# Patient Record
Sex: MALE | Race: WHITE | Employment: OTHER | URBAN - NONMETROPOLITAN AREA
[De-identification: names, ages, dates, MRNs, and addresses within clinical notes are randomized per-mention and may not be internally consistent; named-entity substitution may affect disease eponyms.]

---

## 2017-01-04 ENCOUNTER — HOSPITAL ENCOUNTER (OUTPATIENT)
Dept: CARDIAC REHAB | Age: 67
Discharge: HOME | End: 2017-01-04

## 2017-01-04 PROCEDURE — 9430000000 HC XCARDIAC REHAB PHASE 3X

## 2017-01-12 ENCOUNTER — TELEPHONE (OUTPATIENT)
Dept: CARDIOLOGY | Age: 67
End: 2017-01-12

## 2017-01-12 DIAGNOSIS — I48.0 PAROXYSMAL ATRIAL FIBRILLATION (HCC): Primary | ICD-10-CM

## 2017-01-12 DIAGNOSIS — E78.5 HYPERLIPIDEMIA, UNSPECIFIED HYPERLIPIDEMIA TYPE: ICD-10-CM

## 2017-01-12 RX ORDER — POTASSIUM CHLORIDE 750 MG/1
20 CAPSULE, EXTENDED RELEASE ORAL EVERY EVENING
Qty: 180 CAPSULE | Refills: 3 | Status: SHIPPED | OUTPATIENT
Start: 2017-01-12 | End: 2017-12-20 | Stop reason: SDUPTHER

## 2017-01-25 ENCOUNTER — ANTI-COAG VISIT (OUTPATIENT)
Dept: CARDIOLOGY | Age: 67
End: 2017-01-25
Payer: MEDICARE

## 2017-01-25 DIAGNOSIS — I48.0 PAROXYSMAL ATRIAL FIBRILLATION (HCC): Primary | ICD-10-CM

## 2017-01-25 LAB
INTERNATIONAL NORMALIZATION RATIO, POC: 2
PROTHROMBIN TIME, POC: NORMAL

## 2017-01-25 PROCEDURE — 85610 PROTHROMBIN TIME: CPT | Performed by: NURSE PRACTITIONER

## 2017-02-28 ENCOUNTER — ANTI-COAG VISIT (OUTPATIENT)
Dept: CARDIOLOGY | Age: 67
End: 2017-02-28
Payer: MEDICARE

## 2017-02-28 DIAGNOSIS — I48.0 PAROXYSMAL ATRIAL FIBRILLATION (HCC): Primary | ICD-10-CM

## 2017-02-28 LAB
INTERNATIONAL NORMALIZATION RATIO, POC: 2.9
PROTHROMBIN TIME, POC: NORMAL

## 2017-02-28 PROCEDURE — 85610 PROTHROMBIN TIME: CPT | Performed by: CLINICAL NURSE SPECIALIST

## 2017-03-18 ENCOUNTER — OFFICE VISIT (OUTPATIENT)
Dept: URGENT CARE | Age: 67
End: 2017-03-18
Payer: MEDICARE

## 2017-03-18 VITALS
SYSTOLIC BLOOD PRESSURE: 125 MMHG | BODY MASS INDEX: 31.97 KG/M2 | HEART RATE: 80 BPM | WEIGHT: 236 LBS | HEIGHT: 72 IN | OXYGEN SATURATION: 98 % | DIASTOLIC BLOOD PRESSURE: 66 MMHG | TEMPERATURE: 99.3 F

## 2017-03-18 DIAGNOSIS — J20.9 ACUTE BRONCHITIS, UNSPECIFIED ORGANISM: Primary | ICD-10-CM

## 2017-03-18 DIAGNOSIS — R52 BODY ACHES: ICD-10-CM

## 2017-03-18 LAB
INFLUENZA A ANTIBODY: NEGATIVE
INFLUENZA B ANTIBODY: NEGATIVE

## 2017-03-18 PROCEDURE — 87804 INFLUENZA ASSAY W/OPTIC: CPT | Performed by: FAMILY MEDICINE

## 2017-03-18 PROCEDURE — 99213 OFFICE O/P EST LOW 20 MIN: CPT | Performed by: FAMILY MEDICINE

## 2017-03-18 RX ORDER — DOXYCYCLINE HYCLATE 100 MG/1
100 CAPSULE ORAL 2 TIMES DAILY
Qty: 20 CAPSULE | Refills: 0 | Status: SHIPPED | OUTPATIENT
Start: 2017-03-18 | End: 2017-03-28

## 2017-03-18 ASSESSMENT — ENCOUNTER SYMPTOMS
COUGH: 1
HEMOPTYSIS: 0
WHEEZING: 0
SHORTNESS OF BREATH: 0
SINUS PRESSURE: 0
RHINORRHEA: 1
SORE THROAT: 1

## 2017-03-20 ENCOUNTER — OFFICE VISIT (OUTPATIENT)
Dept: CARDIOLOGY | Age: 67
End: 2017-03-20
Payer: MEDICARE

## 2017-03-20 VITALS
BODY MASS INDEX: 31.02 KG/M2 | HEART RATE: 77 BPM | HEIGHT: 72 IN | SYSTOLIC BLOOD PRESSURE: 102 MMHG | DIASTOLIC BLOOD PRESSURE: 66 MMHG | WEIGHT: 229 LBS

## 2017-03-20 DIAGNOSIS — I48.0 PAROXYSMAL ATRIAL FIBRILLATION (HCC): Primary | ICD-10-CM

## 2017-03-20 LAB
INTERNATIONAL NORMALIZATION RATIO, POC: 1.7
PROTHROMBIN TIME, POC: NORMAL

## 2017-03-20 PROCEDURE — 99214 OFFICE O/P EST MOD 30 MIN: CPT | Performed by: INTERNAL MEDICINE

## 2017-03-20 PROCEDURE — 85610 PROTHROMBIN TIME: CPT | Performed by: INTERNAL MEDICINE

## 2017-03-20 PROCEDURE — 93000 ELECTROCARDIOGRAM COMPLETE: CPT | Performed by: INTERNAL MEDICINE

## 2017-03-30 ENCOUNTER — ANTI-COAG VISIT (OUTPATIENT)
Dept: CARDIOLOGY | Age: 67
End: 2017-03-30
Payer: MEDICARE

## 2017-03-30 DIAGNOSIS — I48.0 PAROXYSMAL ATRIAL FIBRILLATION (HCC): Primary | ICD-10-CM

## 2017-03-30 LAB
INTERNATIONAL NORMALIZATION RATIO, POC: 3
PROTHROMBIN TIME, POC: NORMAL

## 2017-03-30 PROCEDURE — 85610 PROTHROMBIN TIME: CPT | Performed by: CLINICAL NURSE SPECIALIST

## 2017-04-13 ENCOUNTER — ANTI-COAG VISIT (OUTPATIENT)
Dept: CARDIOLOGY | Age: 67
End: 2017-04-13
Payer: MEDICARE

## 2017-04-13 DIAGNOSIS — I48.0 PAROXYSMAL ATRIAL FIBRILLATION (HCC): Primary | ICD-10-CM

## 2017-04-13 LAB
INTERNATIONAL NORMALIZATION RATIO, POC: 2.8
PROTHROMBIN TIME, POC: NORMAL

## 2017-04-13 PROCEDURE — 85610 PROTHROMBIN TIME: CPT | Performed by: CLINICAL NURSE SPECIALIST

## 2017-04-19 ENCOUNTER — OFFICE VISIT (OUTPATIENT)
Dept: CARDIOLOGY | Age: 67
End: 2017-04-19
Payer: MEDICARE

## 2017-04-19 ENCOUNTER — TELEPHONE (OUTPATIENT)
Dept: CARDIOLOGY | Age: 67
End: 2017-04-19

## 2017-04-19 DIAGNOSIS — I48.91 ATRIAL FIBRILLATION, UNSPECIFIED TYPE (HCC): Primary | ICD-10-CM

## 2017-04-19 PROCEDURE — 93000 ELECTROCARDIOGRAM COMPLETE: CPT | Performed by: CLINICAL NURSE SPECIALIST

## 2017-05-18 ENCOUNTER — ANTI-COAG VISIT (OUTPATIENT)
Dept: CARDIOLOGY | Age: 67
End: 2017-05-18
Payer: MEDICARE

## 2017-05-18 DIAGNOSIS — I48.0 PAROXYSMAL ATRIAL FIBRILLATION (HCC): Primary | ICD-10-CM

## 2017-05-18 LAB
INTERNATIONAL NORMALIZATION RATIO, POC: 2.6
PROTHROMBIN TIME, POC: NORMAL

## 2017-05-18 PROCEDURE — 85610 PROTHROMBIN TIME: CPT | Performed by: NURSE PRACTITIONER

## 2017-06-15 ENCOUNTER — ANTI-COAG VISIT (OUTPATIENT)
Dept: CARDIOLOGY | Age: 67
End: 2017-06-15
Payer: MEDICARE

## 2017-06-15 DIAGNOSIS — I48.0 PAROXYSMAL ATRIAL FIBRILLATION (HCC): Primary | ICD-10-CM

## 2017-06-15 LAB
INTERNATIONAL NORMALIZATION RATIO, POC: 2.9
PROTHROMBIN TIME, POC: NORMAL

## 2017-06-15 PROCEDURE — 85610 PROTHROMBIN TIME: CPT | Performed by: CLINICAL NURSE SPECIALIST

## 2017-06-15 RX ORDER — FUROSEMIDE 80 MG
80 TABLET ORAL DAILY
Qty: 90 TABLET | Refills: 3 | Status: SHIPPED | OUTPATIENT
Start: 2017-06-15

## 2017-07-06 ENCOUNTER — OFFICE VISIT (OUTPATIENT)
Dept: CARDIOLOGY | Age: 67
End: 2017-07-06
Payer: MEDICARE

## 2017-07-06 VITALS
BODY MASS INDEX: 31.69 KG/M2 | HEART RATE: 96 BPM | HEIGHT: 72 IN | SYSTOLIC BLOOD PRESSURE: 118 MMHG | WEIGHT: 234 LBS | DIASTOLIC BLOOD PRESSURE: 78 MMHG

## 2017-07-06 DIAGNOSIS — I48.0 PAROXYSMAL ATRIAL FIBRILLATION (HCC): Primary | ICD-10-CM

## 2017-07-06 LAB
INTERNATIONAL NORMALIZATION RATIO, POC: 2.7
PROTHROMBIN TIME, POC: NORMAL

## 2017-07-06 PROCEDURE — 99214 OFFICE O/P EST MOD 30 MIN: CPT | Performed by: INTERNAL MEDICINE

## 2017-07-06 PROCEDURE — 93000 ELECTROCARDIOGRAM COMPLETE: CPT | Performed by: INTERNAL MEDICINE

## 2017-07-06 PROCEDURE — 85610 PROTHROMBIN TIME: CPT | Performed by: INTERNAL MEDICINE

## 2017-07-06 RX ORDER — METOPROLOL SUCCINATE 25 MG/1
25 TABLET, EXTENDED RELEASE ORAL 2 TIMES DAILY
Qty: 180 TABLET | Refills: 3 | Status: SHIPPED | OUTPATIENT
Start: 2017-07-06 | End: 2018-04-26 | Stop reason: DRUGHIGH

## 2017-07-07 ENCOUNTER — TELEPHONE (OUTPATIENT)
Dept: CARDIOLOGY | Age: 67
End: 2017-07-07

## 2017-07-12 ENCOUNTER — TELEPHONE (OUTPATIENT)
Dept: CARDIOLOGY | Age: 67
End: 2017-07-12

## 2017-07-12 ENCOUNTER — HOSPITAL ENCOUNTER (OUTPATIENT)
Dept: CARDIAC CATH/INVASIVE PROCEDURES | Age: 67
Discharge: HOME | End: 2017-07-12
Payer: MEDICARE

## 2017-07-12 VITALS
DIASTOLIC BLOOD PRESSURE: 61 MMHG | TEMPERATURE: 98.8 F | HEART RATE: 73 BPM | SYSTOLIC BLOOD PRESSURE: 110 MMHG | RESPIRATION RATE: 13 BRPM | OXYGEN SATURATION: 98 % | BODY MASS INDEX: 31.56 KG/M2 | HEIGHT: 72 IN | WEIGHT: 233 LBS

## 2017-07-12 DIAGNOSIS — I48.0 PAROXYSMAL ATRIAL FIBRILLATION (HCC): ICD-10-CM

## 2017-07-12 LAB
ANION GAP SERPL CALCULATED.3IONS-SCNC: 12 MMOL/L (ref 7–19)
BUN BLDV-MCNC: 20 MG/DL (ref 8–23)
CALCIUM SERPL-MCNC: 9.2 MG/DL (ref 8.8–10.2)
CHLORIDE BLD-SCNC: 102 MMOL/L (ref 98–111)
CO2: 26 MMOL/L (ref 22–29)
CREAT SERPL-MCNC: 1.2 MG/DL (ref 0.5–1.2)
GFR NON-AFRICAN AMERICAN: >60
GLUCOSE BLD-MCNC: 136 MG/DL (ref 74–109)
INR BLD: 1.59 (ref 0.88–1.18)
INR BLD: 1.99 (ref 0.88–1.18)
POTASSIUM SERPL-SCNC: 4.2 MMOL/L (ref 3.5–5)
PROTHROMBIN TIME: 19 SEC (ref 12–14.6)
PROTHROMBIN TIME: 22.7 SEC (ref 12–14.6)
SODIUM BLD-SCNC: 140 MMOL/L (ref 136–145)

## 2017-07-12 PROCEDURE — 92960 CARDIOVERSION ELECTRIC EXT: CPT | Performed by: INTERNAL MEDICINE

## 2017-07-12 PROCEDURE — 2580000003 HC RX 258: Performed by: INTERNAL MEDICINE

## 2017-07-12 PROCEDURE — 36415 COLL VENOUS BLD VENIPUNCTURE: CPT

## 2017-07-12 PROCEDURE — 2500000003 HC RX 250 WO HCPCS

## 2017-07-12 PROCEDURE — 6370000000 HC RX 637 (ALT 250 FOR IP): Performed by: INTERNAL MEDICINE

## 2017-07-12 PROCEDURE — 85610 PROTHROMBIN TIME: CPT

## 2017-07-12 PROCEDURE — 92960 CARDIOVERSION ELECTRIC EXT: CPT

## 2017-07-12 PROCEDURE — 80048 BASIC METABOLIC PNL TOTAL CA: CPT

## 2017-07-12 RX ORDER — SODIUM CHLORIDE 9 MG/ML
INJECTION, SOLUTION INTRAVENOUS CONTINUOUS
Status: DISCONTINUED | OUTPATIENT
Start: 2017-07-12 | End: 2017-07-14 | Stop reason: HOSPADM

## 2017-07-12 RX ADMIN — APIXABAN 5 MG: 2.5 TABLET, FILM COATED ORAL at 11:07

## 2017-07-12 RX ADMIN — SODIUM CHLORIDE: 9 INJECTION, SOLUTION INTRAVENOUS at 07:40

## 2017-07-15 ENCOUNTER — OFFICE VISIT (OUTPATIENT)
Dept: URGENT CARE | Age: 67
End: 2017-07-15
Payer: MEDICARE

## 2017-07-15 VITALS
OXYGEN SATURATION: 98 % | BODY MASS INDEX: 31.29 KG/M2 | HEART RATE: 72 BPM | TEMPERATURE: 98.3 F | SYSTOLIC BLOOD PRESSURE: 106 MMHG | WEIGHT: 231 LBS | HEIGHT: 72 IN | DIASTOLIC BLOOD PRESSURE: 71 MMHG | RESPIRATION RATE: 20 BRPM

## 2017-07-15 DIAGNOSIS — J01.90 ACUTE SINUSITIS, UNSPECIFIED: Primary | ICD-10-CM

## 2017-07-15 PROCEDURE — 99213 OFFICE O/P EST LOW 20 MIN: CPT | Performed by: PHYSICIAN ASSISTANT

## 2017-07-15 RX ORDER — SULFAMETHOXAZOLE AND TRIMETHOPRIM 800; 160 MG/1; MG/1
1 TABLET ORAL 2 TIMES DAILY
Qty: 20 TABLET | Refills: 0 | Status: SHIPPED | OUTPATIENT
Start: 2017-07-15 | End: 2017-07-25

## 2017-07-15 ASSESSMENT — ENCOUNTER SYMPTOMS
SHORTNESS OF BREATH: 0
COUGH: 1
SORE THROAT: 0
HOARSE VOICE: 0
GASTROINTESTINAL NEGATIVE: 1
SINUS PRESSURE: 1

## 2017-07-19 ENCOUNTER — OFFICE VISIT (OUTPATIENT)
Dept: CARDIOLOGY | Age: 67
End: 2017-07-19
Payer: MEDICARE

## 2017-07-19 VITALS
SYSTOLIC BLOOD PRESSURE: 124 MMHG | HEIGHT: 72 IN | DIASTOLIC BLOOD PRESSURE: 70 MMHG | HEART RATE: 68 BPM | BODY MASS INDEX: 31.42 KG/M2 | WEIGHT: 232 LBS

## 2017-07-19 DIAGNOSIS — R55 SYNCOPE AND COLLAPSE: Primary | ICD-10-CM

## 2017-07-19 PROCEDURE — 93000 ELECTROCARDIOGRAM COMPLETE: CPT | Performed by: CLINICAL NURSE SPECIALIST

## 2017-08-15 ENCOUNTER — TELEPHONE (OUTPATIENT)
Dept: CARDIOLOGY | Age: 67
End: 2017-08-15

## 2017-08-16 ENCOUNTER — TELEPHONE (OUTPATIENT)
Dept: CARDIOLOGY | Age: 67
End: 2017-08-16

## 2017-08-22 ENCOUNTER — HOSPITAL ENCOUNTER (EMERGENCY)
Age: 67
Discharge: HOME | End: 2017-08-23
Attending: EMERGENCY MEDICINE
Payer: MEDICARE

## 2017-08-22 ENCOUNTER — APPOINTMENT (OUTPATIENT)
Dept: GENERAL RADIOLOGY | Age: 67
End: 2017-08-22
Payer: MEDICARE

## 2017-08-22 DIAGNOSIS — G45.8 OTHER SPECIFIED TRANSIENT CEREBRAL ISCHEMIAS: Primary | ICD-10-CM

## 2017-08-22 DIAGNOSIS — R77.8 ELEVATED TROPONIN: ICD-10-CM

## 2017-08-22 DIAGNOSIS — Z98.890 HISTORY OF CARDIAC RADIOFREQUENCY ABLATION: ICD-10-CM

## 2017-08-22 LAB
ALBUMIN SERPL-MCNC: 4.1 G/DL (ref 3.5–5.2)
ALP BLD-CCNC: 64 U/L (ref 40–130)
ALT SERPL-CCNC: 17 U/L (ref 5–41)
ANION GAP SERPL CALCULATED.3IONS-SCNC: 13 MMOL/L (ref 7–19)
AST SERPL-CCNC: 20 U/L (ref 5–40)
BASOPHILS ABSOLUTE: 0.1 K/UL (ref 0–0.2)
BASOPHILS RELATIVE PERCENT: 0.7 % (ref 0–1)
BILIRUB SERPL-MCNC: 0.6 MG/DL (ref 0.2–1.2)
BUN BLDV-MCNC: 19 MG/DL (ref 8–23)
CALCIUM SERPL-MCNC: 10 MG/DL (ref 8.8–10.2)
CHLORIDE BLD-SCNC: 96 MMOL/L (ref 98–111)
CO2: 28 MMOL/L (ref 22–29)
CREAT SERPL-MCNC: 0.9 MG/DL (ref 0.5–1.2)
EOSINOPHILS ABSOLUTE: 0.5 K/UL (ref 0–0.6)
EOSINOPHILS RELATIVE PERCENT: 5.3 % (ref 0–5)
GFR NON-AFRICAN AMERICAN: >60
GLUCOSE BLD-MCNC: 113 MG/DL (ref 74–109)
HCT VFR BLD CALC: 45.3 % (ref 42–52)
HEMOGLOBIN: 15.5 G/DL (ref 14–18)
INR BLD: 1.09 (ref 0.88–1.18)
LYMPHOCYTES ABSOLUTE: 2.7 K/UL (ref 1.1–4.5)
LYMPHOCYTES RELATIVE PERCENT: 31.9 % (ref 20–40)
MCH RBC QN AUTO: 32 PG (ref 27–31)
MCHC RBC AUTO-ENTMCNC: 34.2 G/DL (ref 33–37)
MCV RBC AUTO: 93.4 FL (ref 80–94)
MONOCYTES ABSOLUTE: 1.1 K/UL (ref 0–0.9)
MONOCYTES RELATIVE PERCENT: 13.1 % (ref 0–10)
NEUTROPHILS ABSOLUTE: 4.1 K/UL (ref 1.5–7.5)
NEUTROPHILS RELATIVE PERCENT: 48.3 % (ref 50–65)
PDW BLD-RTO: 12.1 % (ref 11.5–14.5)
PERFORMED ON: ABNORMAL
PLATELET # BLD: 242 K/UL (ref 130–400)
PMV BLD AUTO: 10 FL (ref 9.4–12.4)
POC TROPONIN I: 0.15 NG/ML (ref 0–0.08)
POTASSIUM SERPL-SCNC: 4.2 MMOL/L (ref 3.5–5)
PROTHROMBIN TIME: 14 SEC (ref 12–14.6)
RBC # BLD: 4.85 M/UL (ref 4.7–6.1)
SODIUM BLD-SCNC: 137 MMOL/L (ref 136–145)
TOTAL PROTEIN: 8.4 G/DL (ref 6.6–8.7)
TROPONIN: 0.12 NG/ML (ref 0–0.03)
WBC # BLD: 8.5 K/UL (ref 4.8–10.8)

## 2017-08-22 PROCEDURE — 85025 COMPLETE CBC W/AUTO DIFF WBC: CPT

## 2017-08-22 PROCEDURE — 85610 PROTHROMBIN TIME: CPT

## 2017-08-22 PROCEDURE — 71010 XR CHEST PORTABLE: CPT

## 2017-08-22 PROCEDURE — 80053 COMPREHEN METABOLIC PANEL: CPT

## 2017-08-22 PROCEDURE — 93005 ELECTROCARDIOGRAM TRACING: CPT

## 2017-08-22 PROCEDURE — 84484 ASSAY OF TROPONIN QUANT: CPT

## 2017-08-22 PROCEDURE — 99285 EMERGENCY DEPT VISIT HI MDM: CPT

## 2017-08-22 PROCEDURE — 6370000000 HC RX 637 (ALT 250 FOR IP): Performed by: EMERGENCY MEDICINE

## 2017-08-22 PROCEDURE — 36415 COLL VENOUS BLD VENIPUNCTURE: CPT

## 2017-08-22 RX ORDER — ASPIRIN 325 MG
325 TABLET ORAL ONCE
Status: COMPLETED | OUTPATIENT
Start: 2017-08-22 | End: 2017-08-22

## 2017-08-22 RX ORDER — MONTELUKAST SODIUM 10 MG/1
10 TABLET ORAL NIGHTLY
COMMUNITY
End: 2017-10-23 | Stop reason: ALTCHOICE

## 2017-08-22 RX ADMIN — ASPIRIN 325 MG ORAL TABLET 325 MG: 325 PILL ORAL at 22:13

## 2017-08-23 ENCOUNTER — TELEPHONE (OUTPATIENT)
Dept: CARDIOLOGY | Age: 67
End: 2017-08-23

## 2017-08-23 ENCOUNTER — HOSPITAL ENCOUNTER (OUTPATIENT)
Dept: NON INVASIVE DIAGNOSTICS | Age: 67
Discharge: HOME | End: 2017-08-23
Payer: MEDICARE

## 2017-08-23 VITALS
DIASTOLIC BLOOD PRESSURE: 68 MMHG | OXYGEN SATURATION: 94 % | HEIGHT: 72 IN | WEIGHT: 227 LBS | HEART RATE: 70 BPM | RESPIRATION RATE: 18 BRPM | BODY MASS INDEX: 30.75 KG/M2 | SYSTOLIC BLOOD PRESSURE: 118 MMHG

## 2017-08-23 LAB
PERFORMED ON: ABNORMAL
POC TROPONIN I: 0.13 NG/ML (ref 0–0.08)
TROPONIN: 0.1 NG/ML (ref 0–0.03)

## 2017-08-23 PROCEDURE — 99284 EMERGENCY DEPT VISIT MOD MDM: CPT | Performed by: EMERGENCY MEDICINE

## 2017-08-23 PROCEDURE — 84484 ASSAY OF TROPONIN QUANT: CPT

## 2017-08-23 PROCEDURE — 36415 COLL VENOUS BLD VENIPUNCTURE: CPT

## 2017-08-23 PROCEDURE — 93880 EXTRACRANIAL BILAT STUDY: CPT

## 2017-08-23 ASSESSMENT — ENCOUNTER SYMPTOMS
SHORTNESS OF BREATH: 0
VOMITING: 0
BACK PAIN: 0
EYE PAIN: 0
ABDOMINAL PAIN: 0
COUGH: 0

## 2017-08-24 ENCOUNTER — OFFICE VISIT (OUTPATIENT)
Dept: CARDIOLOGY | Age: 67
End: 2017-08-24
Payer: MEDICARE

## 2017-08-24 VITALS
HEIGHT: 72 IN | SYSTOLIC BLOOD PRESSURE: 124 MMHG | HEART RATE: 81 BPM | DIASTOLIC BLOOD PRESSURE: 62 MMHG | BODY MASS INDEX: 31.02 KG/M2 | WEIGHT: 229 LBS

## 2017-08-24 DIAGNOSIS — I48.0 PAROXYSMAL ATRIAL FIBRILLATION (HCC): Primary | ICD-10-CM

## 2017-08-24 PROCEDURE — 99213 OFFICE O/P EST LOW 20 MIN: CPT | Performed by: INTERNAL MEDICINE

## 2017-08-24 PROCEDURE — 93000 ELECTROCARDIOGRAM COMPLETE: CPT | Performed by: INTERNAL MEDICINE

## 2017-08-30 LAB
EKG P AXIS: 152 DEGREES
EKG P AXIS: 40 DEGREES
EKG P-R INTERVAL: 226 MS
EKG P-R INTERVAL: 229 MS
EKG Q-T INTERVAL: 376 MS
EKG Q-T INTERVAL: 393 MS
EKG QRS DURATION: 101 MS
EKG QRS DURATION: 101 MS
EKG QTC CALCULATION (BAZETT): 429 MS
EKG QTC CALCULATION (BAZETT): 431 MS
EKG T AXIS: 103 DEGREES
EKG T AXIS: 38 DEGREES

## 2017-09-27 DIAGNOSIS — I48.19 PERSISTENT ATRIAL FIBRILLATION (HCC): ICD-10-CM

## 2017-09-27 RX ORDER — LISINOPRIL 5 MG/1
TABLET ORAL
Qty: 90 TABLET | Refills: 2 | Status: ON HOLD | OUTPATIENT
Start: 2017-09-27 | End: 2018-07-11 | Stop reason: HOSPADM

## 2017-10-02 ENCOUNTER — TELEPHONE (OUTPATIENT)
Dept: CARDIOLOGY | Age: 67
End: 2017-10-02

## 2017-10-18 ENCOUNTER — OFFICE VISIT (OUTPATIENT)
Dept: NEUROLOGY | Age: 67
End: 2017-10-18
Payer: MEDICARE

## 2017-10-18 VITALS
HEART RATE: 83 BPM | BODY MASS INDEX: 31.42 KG/M2 | WEIGHT: 232 LBS | DIASTOLIC BLOOD PRESSURE: 67 MMHG | HEIGHT: 72 IN | SYSTOLIC BLOOD PRESSURE: 104 MMHG

## 2017-10-18 DIAGNOSIS — Z95.1 S/P CABG X 4: ICD-10-CM

## 2017-10-18 DIAGNOSIS — G45.3 AMAUROSIS FUGAX: Primary | ICD-10-CM

## 2017-10-18 DIAGNOSIS — I48.0 PAROXYSMAL ATRIAL FIBRILLATION (HCC): ICD-10-CM

## 2017-10-18 DIAGNOSIS — I25.110 CORONARY ARTERY DISEASE INVOLVING NATIVE CORONARY ARTERY OF NATIVE HEART WITH UNSTABLE ANGINA PECTORIS (HCC): ICD-10-CM

## 2017-10-18 PROCEDURE — 99204 OFFICE O/P NEW MOD 45 MIN: CPT | Performed by: PSYCHIATRY & NEUROLOGY

## 2017-10-18 RX ORDER — APIXABAN 5 MG/1
TABLET, FILM COATED ORAL
Refills: 3 | COMMUNITY
Start: 2017-10-08

## 2017-10-18 NOTE — PROGRESS NOTES
Review of Systems    Constitutional  No fever or chills. yes diaphoresis or significant fatigue. HENT   yes tinnitus or significant hearing loss. Eyes  no sudden vision change or eye pain  Respiratory  yes significant shortness of breath or cough  Cardiovascular  yes chest pain No palpitations or significant leg swelling  Gastrointestinal  no abdominal swelling or pain. Genitourinary  No difficulty urinating, dysuria  Musculoskeletal  no back pain or myalgia. Skin  no color change or rash  Neurologic  No seizures. No lateralizing weakness. Hematologic  yes easy bruising or excessive bleeding. Psychiatric  no severe anxiety or nervousness. All other review of systems are negative.

## 2017-10-18 NOTE — PROGRESS NOTES
Chief Complaint   Patient presents with    New Patient     Referred by Dr. Bola Brown for recent TIA        Lorraine Dai is a 79y.o. year old male who is seen for evaluation of Visual impairment in the left eye. The patient indicates approximately 1 month ago he was taking a shower he noticed a checkered appearance in his left eye. He covered and uncovered the right eye and noticed it was only in the left. He denied diplopia, dysarthria, dysphagia, weakness, numbness, or incoordination. He denied any neurological symptoms. He denies any previous similar episodes. There is no headache. He denies any history of migraine. The episode lasted about 30 minutes and resolved. He has had no subsequent events. He does a history of cataract surgery. He is on aspirin and Eliquis and does not miss any doses. His carotid ultrasound was relatively unremarkable.     Active Ambulatory Problems     Diagnosis Date Noted    Atrial fibrillation ([facility] 07/08/2013    CAD (coronary artery disease)     Hyperlipidemia     Sloughing of skin 08/20/2013    S/P CABG x 4 11/11/2013    History of amiodarone therapy     Shortness of breath 07/01/2014    Ex-smoker     CAD (coronary atherosclerotic disease)     Persistent atrial fibrillation (HCC)     Persistent atrial fibrillation (HCC)     Chest pain 06/22/2016    Coronary artery disease involving native coronary artery of native heart with unstable angina pectoris ([facility] 06/22/2016    Syncope and collapse     Typical atrial flutter (HCC)     Paroxysmal atrial fibrillation (HCC)     Chronic combined systolic and diastolic heart failure (HCC)     Sick sinus syndrome (HCC)     Amaurosis fugax 10/18/2017     Resolved Ambulatory Problems     Diagnosis Date Noted    No Resolved Ambulatory Problems     Past Medical History:   Diagnosis Date    Atrial fibrillation (HCC)     Atrial flutter (HCC)     CAD (coronary artery disease)     CAD (coronary atherosclerotic disease)     [facility] disease) [facility]     Diabetes mellitus ([facility]     Ex-smoker     History of amiodarone therapy     Hyperlipidemia     Hypertension     Hypokalemia     Hypotension     MI (myocardial infarction)     S/P CABG x 4 11/11/2013    Stroke ([facility] 08/22/2017       Past Surgical History:   Procedure Laterality Date    ABLATION OF DYSRHYTHMIC FOCUS      CARDIAC CATHETERIZATION  6/27/13  MDL    EF 45%    CARDIAC SURGERY      CABG x 4    CATARACT REMOVAL WITH IMPLANT Bilateral     CORONARY ARTERY BYPASS GRAFT  6/27/2013     Emergency CABG x 4, LIMA-DIAG, SVG-LAD, SVG-OM, SVG-PDA, RT EVH, DR DOLAN    CYST INCISION AND DRAINAGE N/A     RECTAL    EYE SURGERY      EYE SURGERY      cataract sx and implants (both eyes)    OTHER SURGICAL HISTORY      heart ablation    RETROPHYARYNGEAL ABCESS INCISION/DRAIN      Abcess near rectum       Family History   Problem Relation Age of Onset    Stroke Father     Heart Disease Father     High Blood Pressure Father     High Cholesterol Father     Other Father     Heart Disease Other        Allergies   Allergen Reactions    Amiodarone Rash    Pcn [Penicillins]     Penicillins Other (See Comments)     Don't know reaction, was a child. But grandparents(who raised him) said to not take    Amiodarone Rash       Social History     Social History    Marital status:      Spouse name: N/A    Number of children: N/A    Years of education: N/A     Occupational History    Not on file. Social History Main Topics    Smoking status: Former Smoker     Packs/day: 1.50     Years: 40.00     Types: Cigarettes    Smokeless tobacco: Never Used      Comment: quit in 2013    Alcohol use No    Drug use: No    Sexual activity: Yes     Partners: Female     Other Topics Concern    Not on file     Social History Narrative    ** Merged History Encounter **            Review of Systems     Constitutional  No fever or chills.   yes diaphoresis or significant fatigue. HENT   yes tinnitus or significant hearing loss. Eyes  no sudden vision change or eye pain  Respiratory  yes significant shortness of breath or cough  Cardiovascular  yes chest pain No palpitations or significant leg swelling  Gastrointestinal  no abdominal swelling or pain. Genitourinary  No difficulty urinating, dysuria  Musculoskeletal  no back pain or myalgia. Skin  no color change or rash  Neurologic  No seizures. No lateralizing weakness. Hematologic  yes easy bruising or excessive bleeding. Psychiatric  no severe anxiety or nervousness. All other review of systems are negative. Current Outpatient Prescriptions   Medication Sig Dispense Refill    ELIQUIS 5 MG TABS tablet TAKE 1 TABLET BY MOUTH 2 TIMES DAILY  3    lisinopril (PRINIVIL;ZESTRIL) 5 MG tablet TAKE 1/2 TABLET BY MOUTH 2 TIMES DAILY 90 tablet 2    montelukast (SINGULAIR) 10 MG tablet Take 10 mg by mouth nightly      metoprolol succinate (TOPROL XL) 25 MG extended release tablet Take 1 tablet by mouth 2 times daily 180 tablet 3    furosemide (LASIX) 80 MG tablet Take 1 tablet by mouth daily 90 tablet 3    potassium chloride (MICRO-K) 10 MEQ extended release capsule Take 2 capsules by mouth every evening 180 capsule 3    atorvastatin (LIPITOR) 20 MG tablet Take 1 tablet by mouth daily 90 tablet 3    aspirin 81 MG tablet Take 81 mg by mouth daily      Cholecalciferol (VITAMIN D) 2000 UNITS CAPS capsule Take 1,000 Units by mouth nightly        No current facility-administered medications for this visit. /67   Pulse 83   Ht 6' (1.829 m)   Wt 232 lb (105.2 kg)   BMI 31.46 kg/m²     Constitutional  well developed, well nourished.     Eyes  conjunctiva normal.  Pupils react to light  Ear, nose, throat -hearing intact to finger rub No scars, masses, or lesions over external nose or ears, no atrophy of tongue  Neck-symmetric, no masses noted, no jugular vein distension  Respiration- coronary artery of native heart with unstable angina pectoris ([facility]) I25.110 414.01      411.1        His neurological examination today was unremarkable. Based upon his history and examination, symptoms are certainly consistent with a TIA. He is optimized on his medications. I see no further medication management warranted. His carotid ultrasound was essentially unremarkable except for some heterogeneous plaque, which is nonspecific. I recommended that he get a complete eye exam to ensure he does not have an ocular cause for his symptoms. No further recommendations were provided. He is to follow-up with me on a when necessary basis and call with any further problems. Plan    No orders of the defined types were placed in this encounter. No orders of the defined types were placed in this encounter. Return if symptoms worsen or fail to improve.

## 2017-10-23 ENCOUNTER — OFFICE VISIT (OUTPATIENT)
Dept: CARDIOLOGY | Age: 67
End: 2017-10-23
Payer: MEDICARE

## 2017-10-23 VITALS
HEART RATE: 90 BPM | HEIGHT: 72 IN | WEIGHT: 232 LBS | DIASTOLIC BLOOD PRESSURE: 62 MMHG | BODY MASS INDEX: 31.42 KG/M2 | SYSTOLIC BLOOD PRESSURE: 118 MMHG

## 2017-10-23 DIAGNOSIS — I48.0 PAROXYSMAL ATRIAL FIBRILLATION (HCC): Primary | ICD-10-CM

## 2017-10-23 PROCEDURE — 93000 ELECTROCARDIOGRAM COMPLETE: CPT | Performed by: INTERNAL MEDICINE

## 2017-10-23 PROCEDURE — 99213 OFFICE O/P EST LOW 20 MIN: CPT | Performed by: INTERNAL MEDICINE

## 2017-10-24 NOTE — PROGRESS NOTES
[facility], [place]. [address], [place], [address] [address]  Phone: [phone]  Fax: [phone]  Office Visit:  10/23/2017    Renetta Lopez : 1950, Male, 79 y.o. Chief Complaint   Patient presents with    3 Month Follow-Up       HISTORY OF PRESENT ILLNESS:    He presents today for follow-up of his paroxysmal atrial fibrillation. He is status post an ablation procedure down in [place]. I was performed a couple of months ago. I subsequently saw him because of change in his vision that was abrupt and thought possibly to be a retinal TIA. He actually was seen in the emergency room for this. I referred him to neurology for their evaluation which was performed in October. They reviewed his evaluation and suggested that he see an  Ophthalmologist.    He says that he has had no recurrence of visual disturbance and no other neurologic symptoms. He does have occasional episode of chest pain that occurs at random. He has not been having palpitations. He has chronic dyspnea on exertion which has not changed. He feels better since his ablation procedure. He says that he has some chronic phlegm in his throat since the procedure. He said that he was able to wheelbarrow 16 tons of gravel by himself over the course of several days without difficulty.     Patient Active Problem List   Diagnosis Code    Atrial fibrillation (HCC) I48.91    CAD (coronary artery disease) I25.10    Hyperlipidemia E78.5    Sloughing of skin L08.9    S/P CABG x 4 Z95.1    History of amiodarone therapy Z92.29    Shortness of breath R06.02    Ex-smoker Z87.891    CAD (coronary atherosclerotic disease) I25.10    Persistent atrial fibrillation (HCC) I48.1    Persistent atrial fibrillation (HCC) I48.1    Chest pain R07.9    Coronary artery disease involving native coronary artery of native heart with unstable angina pectoris ([facility] I25.110   

## 2017-10-25 ENCOUNTER — TELEPHONE (OUTPATIENT)
Dept: CARDIOLOGY | Age: 67
End: 2017-10-25

## 2017-10-25 NOTE — TELEPHONE ENCOUNTER
Tried to call patient, no answer, no v/m set up to leave message. Bruna Blase do you know anything about the change in the medication from warfarin to eliquis. Patient is requesting letter for [place].

## 2017-10-25 NOTE — TELEPHONE ENCOUNTER
Pt is in the process of getting his meds from [place], except hi Eliquis, which they need a letter from Dr Pito Osei stating that he is on Eliquis because Warfarin wouldn't stabilize his BP. Please call pt @ [phone] when letter is ready.

## 2017-10-26 ENCOUNTER — TELEPHONE (OUTPATIENT)
Dept: CARDIOLOGY | Age: 67
End: 2017-10-26

## 2017-10-26 NOTE — TELEPHONE ENCOUNTER
Would you like me to type up a letter with this information on it? [facility].

## 2017-10-26 NOTE — TELEPHONE ENCOUNTER
From the medication records it appears patient was placed on eliquis on 7/12/17. Patient had labile INR results and needed better control due to multiple episodes of being out of rhythm.     Waylon Gilmore

## 2017-12-20 DIAGNOSIS — I48.0 PAROXYSMAL ATRIAL FIBRILLATION (HCC): ICD-10-CM

## 2017-12-20 RX ORDER — POTASSIUM CHLORIDE 750 MG/1
20 CAPSULE, EXTENDED RELEASE ORAL EVERY EVENING
Qty: 180 CAPSULE | Refills: 3 | Status: SHIPPED | OUTPATIENT
Start: 2017-12-20 | End: 2018-04-26 | Stop reason: DRUGHIGH

## 2018-04-16 ENCOUNTER — HOSPITAL ENCOUNTER (OUTPATIENT)
Dept: PHYSICAL THERAPY | Age: 68
Setting detail: THERAPIES SERIES
Discharge: HOME | End: 2018-04-16
Payer: MEDICARE

## 2018-04-16 PROCEDURE — G8984 CARRY CURRENT STATUS: HCPCS

## 2018-04-16 PROCEDURE — 97162 PT EVAL MOD COMPLEX 30 MIN: CPT

## 2018-04-16 PROCEDURE — G8985 CARRY GOAL STATUS: HCPCS

## 2018-04-16 ASSESSMENT — PAIN DESCRIPTION - LOCATION: LOCATION: SHOULDER

## 2018-04-16 ASSESSMENT — PAIN DESCRIPTION - ORIENTATION: ORIENTATION: LEFT;RIGHT

## 2018-04-16 ASSESSMENT — PAIN DESCRIPTION - PAIN TYPE: TYPE: ACUTE PAIN

## 2018-04-16 ASSESSMENT — PAIN DESCRIPTION - DESCRIPTORS: DESCRIPTORS: SHARP

## 2018-04-16 ASSESSMENT — PAIN DESCRIPTION - FREQUENCY: FREQUENCY: INTERMITTENT

## 2018-04-20 ENCOUNTER — HOSPITAL ENCOUNTER (OUTPATIENT)
Dept: CT IMAGING | Age: 68
Discharge: HOME | End: 2018-04-20
Payer: MEDICARE

## 2018-04-20 DIAGNOSIS — R04.2 HEMOPTYSIS: ICD-10-CM

## 2018-04-20 PROCEDURE — 71260 CT THORAX DX C+: CPT

## 2018-04-20 PROCEDURE — 6360000004 HC RX CONTRAST MEDICATION: Performed by: INTERNAL MEDICINE

## 2018-04-20 RX ADMIN — IOPAMIDOL 50 ML: 755 INJECTION, SOLUTION INTRAVENOUS at 10:28

## 2018-04-23 ENCOUNTER — HOSPITAL ENCOUNTER (OUTPATIENT)
Dept: PHYSICAL THERAPY | Age: 68
Setting detail: THERAPIES SERIES
Discharge: HOME | End: 2018-04-23
Payer: MEDICARE

## 2018-04-23 PROCEDURE — 97035 APP MDLTY 1+ULTRASOUND EA 15: CPT

## 2018-04-23 PROCEDURE — 97110 THERAPEUTIC EXERCISES: CPT

## 2018-04-23 ASSESSMENT — PAIN DESCRIPTION - DESCRIPTORS: DESCRIPTORS: SHARP

## 2018-04-23 ASSESSMENT — PAIN DESCRIPTION - ORIENTATION: ORIENTATION: RIGHT;LEFT

## 2018-04-23 ASSESSMENT — PAIN DESCRIPTION - PAIN TYPE: TYPE: ACUTE PAIN

## 2018-04-23 ASSESSMENT — PAIN SCALES - GENERAL: PAINLEVEL_OUTOF10: 4

## 2018-04-23 ASSESSMENT — PAIN DESCRIPTION - LOCATION: LOCATION: SHOULDER

## 2018-04-25 ENCOUNTER — HOSPITAL ENCOUNTER (OUTPATIENT)
Dept: PHYSICAL THERAPY | Age: 68
Setting detail: THERAPIES SERIES
Discharge: HOME | End: 2018-04-25
Payer: MEDICARE

## 2018-04-25 PROCEDURE — 97035 APP MDLTY 1+ULTRASOUND EA 15: CPT

## 2018-04-25 PROCEDURE — 97110 THERAPEUTIC EXERCISES: CPT

## 2018-04-25 ASSESSMENT — PAIN SCALES - GENERAL: PAINLEVEL_OUTOF10: 3

## 2018-04-25 ASSESSMENT — PAIN DESCRIPTION - PAIN TYPE: TYPE: CHRONIC PAIN

## 2018-04-25 ASSESSMENT — PAIN DESCRIPTION - LOCATION: LOCATION: SHOULDER

## 2018-04-25 ASSESSMENT — PAIN DESCRIPTION - ORIENTATION: ORIENTATION: RIGHT;LEFT

## 2018-04-26 ENCOUNTER — OFFICE VISIT (OUTPATIENT)
Dept: CARDIOLOGY | Age: 68
End: 2018-04-26
Payer: MEDICARE

## 2018-04-26 VITALS
WEIGHT: 231 LBS | HEART RATE: 76 BPM | BODY MASS INDEX: 31.29 KG/M2 | SYSTOLIC BLOOD PRESSURE: 126 MMHG | HEIGHT: 72 IN | DIASTOLIC BLOOD PRESSURE: 72 MMHG

## 2018-04-26 DIAGNOSIS — Z98.890 S/P ABLATION OF ATRIAL FIBRILLATION: ICD-10-CM

## 2018-04-26 DIAGNOSIS — I48.0 PAROXYSMAL ATRIAL FIBRILLATION (HCC): ICD-10-CM

## 2018-04-26 DIAGNOSIS — I25.10 CORONARY ARTERY DISEASE INVOLVING NATIVE CORONARY ARTERY OF NATIVE HEART WITHOUT ANGINA PECTORIS: Primary | ICD-10-CM

## 2018-04-26 DIAGNOSIS — Z95.1 S/P CABG X 4: ICD-10-CM

## 2018-04-26 DIAGNOSIS — I10 ESSENTIAL HYPERTENSION: ICD-10-CM

## 2018-04-26 DIAGNOSIS — Z79.01 CHRONIC ANTICOAGULATION: ICD-10-CM

## 2018-04-26 DIAGNOSIS — Z86.79 S/P ABLATION OF ATRIAL FIBRILLATION: ICD-10-CM

## 2018-04-26 DIAGNOSIS — E78.2 MIXED HYPERLIPIDEMIA: ICD-10-CM

## 2018-04-26 PROCEDURE — 93000 ELECTROCARDIOGRAM COMPLETE: CPT | Performed by: NURSE PRACTITIONER

## 2018-04-26 PROCEDURE — 99213 OFFICE O/P EST LOW 20 MIN: CPT | Performed by: NURSE PRACTITIONER

## 2018-04-26 RX ORDER — METOPROLOL SUCCINATE 25 MG/1
TABLET, EXTENDED RELEASE ORAL
Status: ON HOLD | COMMUNITY
End: 2018-07-11 | Stop reason: HOSPADM

## 2018-04-26 RX ORDER — POTASSIUM CHLORIDE 750 MG/1
20 CAPSULE, EXTENDED RELEASE ORAL NIGHTLY
COMMUNITY
End: 2018-08-14 | Stop reason: SDUPTHER

## 2018-04-26 RX ORDER — ATORVASTATIN CALCIUM 20 MG/1
TABLET, FILM COATED ORAL
Status: ON HOLD | COMMUNITY
End: 2018-07-11 | Stop reason: HOSPADM

## 2018-04-30 ENCOUNTER — HOSPITAL ENCOUNTER (OUTPATIENT)
Dept: PHYSICAL THERAPY | Age: 68
Setting detail: THERAPIES SERIES
Discharge: HOME | End: 2018-04-30
Payer: MEDICARE

## 2018-04-30 PROCEDURE — 97110 THERAPEUTIC EXERCISES: CPT

## 2018-05-02 ENCOUNTER — HOSPITAL ENCOUNTER (OUTPATIENT)
Dept: PHYSICAL THERAPY | Age: 68
Setting detail: THERAPIES SERIES
Discharge: HOME | End: 2018-05-02
Payer: MEDICARE

## 2018-05-02 PROCEDURE — 97035 APP MDLTY 1+ULTRASOUND EA 15: CPT

## 2018-05-02 PROCEDURE — 97110 THERAPEUTIC EXERCISES: CPT

## 2018-05-02 ASSESSMENT — PAIN DESCRIPTION - PAIN TYPE: TYPE: CHRONIC PAIN

## 2018-05-02 ASSESSMENT — PAIN SCALES - GENERAL: PAINLEVEL_OUTOF10: 1

## 2018-05-02 ASSESSMENT — PAIN DESCRIPTION - DESCRIPTORS: DESCRIPTORS: SHARP

## 2018-05-02 ASSESSMENT — PAIN DESCRIPTION - LOCATION: LOCATION: SHOULDER

## 2018-05-02 ASSESSMENT — PAIN DESCRIPTION - ORIENTATION: ORIENTATION: RIGHT;LEFT

## 2018-05-07 ENCOUNTER — HOSPITAL ENCOUNTER (OUTPATIENT)
Dept: PHYSICAL THERAPY | Age: 68
Setting detail: THERAPIES SERIES
Discharge: HOME | End: 2018-05-07
Payer: MEDICARE

## 2018-05-07 PROCEDURE — 97110 THERAPEUTIC EXERCISES: CPT

## 2018-05-09 ENCOUNTER — HOSPITAL ENCOUNTER (OUTPATIENT)
Dept: PHYSICAL THERAPY | Age: 68
Setting detail: THERAPIES SERIES
Discharge: HOME | End: 2018-05-09
Payer: MEDICARE

## 2018-05-09 PROCEDURE — 97110 THERAPEUTIC EXERCISES: CPT

## 2018-05-09 ASSESSMENT — PAIN SCALES - GENERAL: PAINLEVEL_OUTOF10: 2

## 2018-05-09 ASSESSMENT — PAIN DESCRIPTION - LOCATION: LOCATION: SHOULDER

## 2018-05-09 ASSESSMENT — PAIN DESCRIPTION - PAIN TYPE: TYPE: CHRONIC PAIN

## 2018-05-09 ASSESSMENT — PAIN DESCRIPTION - ORIENTATION: ORIENTATION: RIGHT;LEFT

## 2018-05-14 ENCOUNTER — HOSPITAL ENCOUNTER (OUTPATIENT)
Dept: PHYSICAL THERAPY | Age: 68
Setting detail: THERAPIES SERIES
Discharge: HOME | End: 2018-05-14
Payer: MEDICARE

## 2018-05-14 PROCEDURE — 97110 THERAPEUTIC EXERCISES: CPT

## 2018-05-14 PROCEDURE — G8985 CARRY GOAL STATUS: HCPCS

## 2018-05-14 PROCEDURE — G8984 CARRY CURRENT STATUS: HCPCS

## 2018-05-16 ENCOUNTER — APPOINTMENT (OUTPATIENT)
Dept: PHYSICAL THERAPY | Age: 68
End: 2018-05-16
Payer: MEDICARE

## 2018-05-21 PROCEDURE — G8984 CARRY CURRENT STATUS: HCPCS

## 2018-05-21 PROCEDURE — G8986 CARRY D/C STATUS: HCPCS

## 2018-05-21 PROCEDURE — G8985 CARRY GOAL STATUS: HCPCS

## 2018-05-28 ENCOUNTER — APPOINTMENT (OUTPATIENT)
Dept: GENERAL RADIOLOGY | Age: 68
End: 2018-05-28
Payer: MEDICARE

## 2018-05-28 ENCOUNTER — HOSPITAL ENCOUNTER (EMERGENCY)
Age: 68
Discharge: HOME | End: 2018-05-28
Attending: EMERGENCY MEDICINE
Payer: MEDICARE

## 2018-05-28 VITALS
SYSTOLIC BLOOD PRESSURE: 111 MMHG | OXYGEN SATURATION: 94 % | TEMPERATURE: 98.2 F | RESPIRATION RATE: 20 BRPM | WEIGHT: 225 LBS | BODY MASS INDEX: 30.52 KG/M2 | DIASTOLIC BLOOD PRESSURE: 67 MMHG | HEART RATE: 82 BPM

## 2018-05-28 DIAGNOSIS — R07.89 ATYPICAL CHEST PAIN: Primary | ICD-10-CM

## 2018-05-28 DIAGNOSIS — M62.838 SPASM OF MUSCLE: ICD-10-CM

## 2018-05-28 LAB
ALBUMIN SERPL-MCNC: 4.3 G/DL (ref 3.5–5.2)
ALP BLD-CCNC: 62 U/L (ref 40–130)
ALT SERPL-CCNC: 9 U/L (ref 5–41)
ANION GAP SERPL CALCULATED.3IONS-SCNC: 13 MMOL/L (ref 7–19)
APTT: 28.1 SEC (ref 26–36.2)
AST SERPL-CCNC: 15 U/L (ref 5–40)
BASOPHILS ABSOLUTE: 0 K/UL (ref 0–0.2)
BASOPHILS RELATIVE PERCENT: 0.4 % (ref 0–1)
BILIRUB SERPL-MCNC: 0.9 MG/DL (ref 0.2–1.2)
BUN BLDV-MCNC: 14 MG/DL (ref 8–23)
CALCIUM SERPL-MCNC: 9.1 MG/DL (ref 8.8–10.2)
CHLORIDE BLD-SCNC: 98 MMOL/L (ref 98–111)
CO2: 27 MMOL/L (ref 22–29)
CREAT SERPL-MCNC: 0.8 MG/DL (ref 0.5–1.2)
EOSINOPHILS ABSOLUTE: 0.4 K/UL (ref 0–0.6)
EOSINOPHILS RELATIVE PERCENT: 4.3 % (ref 0–5)
GFR NON-AFRICAN AMERICAN: >60
GLUCOSE BLD-MCNC: 151 MG/DL (ref 74–109)
HCT VFR BLD CALC: 42.9 % (ref 42–52)
HEMOGLOBIN: 14.3 G/DL (ref 14–18)
INR BLD: 1.15 (ref 0.88–1.18)
LYMPHOCYTES ABSOLUTE: 1.8 K/UL (ref 1.1–4.5)
LYMPHOCYTES RELATIVE PERCENT: 19.9 % (ref 20–40)
MAGNESIUM: 2 MG/DL (ref 1.6–2.4)
MCH RBC QN AUTO: 31.5 PG (ref 27–31)
MCHC RBC AUTO-ENTMCNC: 33.3 G/DL (ref 33–37)
MCV RBC AUTO: 94.5 FL (ref 80–94)
MONOCYTES ABSOLUTE: 0.7 K/UL (ref 0–0.9)
MONOCYTES RELATIVE PERCENT: 7.8 % (ref 0–10)
NEUTROPHILS ABSOLUTE: 6.1 K/UL (ref 1.5–7.5)
NEUTROPHILS RELATIVE PERCENT: 67.1 % (ref 50–65)
PDW BLD-RTO: 12.4 % (ref 11.5–14.5)
PERFORMED ON: NORMAL
PERFORMED ON: NORMAL
PLATELET # BLD: 249 K/UL (ref 130–400)
PMV BLD AUTO: 9.9 FL (ref 9.4–12.4)
POC TROPONIN I: 0 NG/ML (ref 0–0.08)
POC TROPONIN I: 0.01 NG/ML (ref 0–0.08)
POTASSIUM SERPL-SCNC: 3.6 MMOL/L (ref 3.5–5)
PRO-BNP: 297 PG/ML (ref 0–900)
PROTHROMBIN TIME: 14.6 SEC (ref 12–14.6)
RBC # BLD: 4.54 M/UL (ref 4.7–6.1)
SODIUM BLD-SCNC: 138 MMOL/L (ref 136–145)
TOTAL PROTEIN: 7.4 G/DL (ref 6.6–8.7)
WBC # BLD: 9.1 K/UL (ref 4.8–10.8)

## 2018-05-28 PROCEDURE — 85730 THROMBOPLASTIN TIME PARTIAL: CPT

## 2018-05-28 PROCEDURE — 83735 ASSAY OF MAGNESIUM: CPT

## 2018-05-28 PROCEDURE — 99285 EMERGENCY DEPT VISIT HI MDM: CPT

## 2018-05-28 PROCEDURE — 80053 COMPREHEN METABOLIC PANEL: CPT

## 2018-05-28 PROCEDURE — 84484 ASSAY OF TROPONIN QUANT: CPT

## 2018-05-28 PROCEDURE — 85610 PROTHROMBIN TIME: CPT

## 2018-05-28 PROCEDURE — 93005 ELECTROCARDIOGRAM TRACING: CPT

## 2018-05-28 PROCEDURE — 99284 EMERGENCY DEPT VISIT MOD MDM: CPT | Performed by: EMERGENCY MEDICINE

## 2018-05-28 PROCEDURE — 83880 ASSAY OF NATRIURETIC PEPTIDE: CPT

## 2018-05-28 PROCEDURE — 71045 X-RAY EXAM CHEST 1 VIEW: CPT

## 2018-05-28 PROCEDURE — 36415 COLL VENOUS BLD VENIPUNCTURE: CPT

## 2018-05-28 PROCEDURE — 85025 COMPLETE CBC W/AUTO DIFF WBC: CPT

## 2018-05-28 ASSESSMENT — ENCOUNTER SYMPTOMS
SHORTNESS OF BREATH: 0
NAUSEA: 0
COUGH: 0
SORE THROAT: 0
VOMITING: 0
RHINORRHEA: 0
ABDOMINAL PAIN: 0
BACK PAIN: 0
DIARRHEA: 0

## 2018-05-30 LAB
EKG P AXIS: 34 DEGREES
EKG P-R INTERVAL: 208 MS
EKG Q-T INTERVAL: 378 MS
EKG QRS DURATION: 98 MS
EKG QTC CALCULATION (BAZETT): 414 MS
EKG T AXIS: 68 DEGREES

## 2018-05-31 ENCOUNTER — OFFICE VISIT (OUTPATIENT)
Dept: CARDIOLOGY | Age: 68
End: 2018-05-31
Payer: MEDICARE

## 2018-05-31 VITALS
WEIGHT: 224 LBS | DIASTOLIC BLOOD PRESSURE: 86 MMHG | HEIGHT: 72 IN | BODY MASS INDEX: 30.34 KG/M2 | HEART RATE: 82 BPM | SYSTOLIC BLOOD PRESSURE: 126 MMHG

## 2018-05-31 DIAGNOSIS — I25.10 CORONARY ARTERY DISEASE INVOLVING NATIVE CORONARY ARTERY OF NATIVE HEART WITHOUT ANGINA PECTORIS: Primary | ICD-10-CM

## 2018-05-31 PROCEDURE — 99213 OFFICE O/P EST LOW 20 MIN: CPT | Performed by: NURSE PRACTITIONER

## 2018-05-31 PROCEDURE — 93000 ELECTROCARDIOGRAM COMPLETE: CPT | Performed by: NURSE PRACTITIONER

## 2018-06-20 ENCOUNTER — OFFICE VISIT (OUTPATIENT)
Dept: CARDIOLOGY | Age: 68
End: 2018-06-20
Payer: MEDICARE

## 2018-06-20 ENCOUNTER — HOSPITAL ENCOUNTER (OUTPATIENT)
Dept: NON INVASIVE DIAGNOSTICS | Age: 68
Discharge: HOME | End: 2018-06-20
Payer: MEDICARE

## 2018-06-20 VITALS
SYSTOLIC BLOOD PRESSURE: 128 MMHG | DIASTOLIC BLOOD PRESSURE: 78 MMHG | HEIGHT: 72 IN | BODY MASS INDEX: 30.34 KG/M2 | WEIGHT: 224 LBS | HEART RATE: 72 BPM

## 2018-06-20 DIAGNOSIS — I49.9 IRREGULAR HEART RHYTHM: ICD-10-CM

## 2018-06-20 DIAGNOSIS — I48.0 PAF (PAROXYSMAL ATRIAL FIBRILLATION) (HCC): ICD-10-CM

## 2018-06-20 DIAGNOSIS — I25.10 CORONARY ARTERY DISEASE INVOLVING NATIVE CORONARY ARTERY OF NATIVE HEART WITHOUT ANGINA PECTORIS: ICD-10-CM

## 2018-06-20 DIAGNOSIS — R07.9 CHEST PAIN, UNSPECIFIED TYPE: Primary | ICD-10-CM

## 2018-06-20 PROCEDURE — 99214 OFFICE O/P EST MOD 30 MIN: CPT | Performed by: NURSE PRACTITIONER

## 2018-06-20 PROCEDURE — 93229 REMOTE 30 DAY ECG TECH SUPP: CPT

## 2018-06-20 PROCEDURE — 99999 PR EXT ECG > 48HR TO 21 DAY RCRD W/CONECT INTL RCRD: CPT | Performed by: NURSE PRACTITIONER

## 2018-06-20 PROCEDURE — 93000 ELECTROCARDIOGRAM COMPLETE: CPT | Performed by: NURSE PRACTITIONER

## 2018-07-02 ENCOUNTER — HOSPITAL ENCOUNTER (OUTPATIENT)
Dept: NUCLEAR MEDICINE | Age: 68
Discharge: HOME | End: 2018-07-04
Payer: MEDICARE

## 2018-07-02 ENCOUNTER — HOSPITAL ENCOUNTER (OUTPATIENT)
Dept: NON INVASIVE DIAGNOSTICS | Age: 68
Discharge: HOME | End: 2018-07-02
Payer: MEDICARE

## 2018-07-02 DIAGNOSIS — R07.9 CHEST PAIN, UNSPECIFIED TYPE: ICD-10-CM

## 2018-07-02 DIAGNOSIS — I25.10 CORONARY ARTERY DISEASE INVOLVING NATIVE CORONARY ARTERY OF NATIVE HEART WITHOUT ANGINA PECTORIS: ICD-10-CM

## 2018-07-02 PROCEDURE — 78452 HT MUSCLE IMAGE SPECT MULT: CPT

## 2018-07-02 PROCEDURE — 6360000002 HC RX W HCPCS: Performed by: INTERNAL MEDICINE

## 2018-07-02 PROCEDURE — 93017 CV STRESS TEST TRACING ONLY: CPT

## 2018-07-02 PROCEDURE — 3430000000 HC RX DIAGNOSTIC RADIOPHARMACEUTICAL: Performed by: INTERNAL MEDICINE

## 2018-07-02 PROCEDURE — A9500 TC99M SESTAMIBI: HCPCS | Performed by: INTERNAL MEDICINE

## 2018-07-02 RX ADMIN — TETRAKIS(2-METHOXYISOBUTYLISOCYANIDE)COPPER(I) TETRAFLUOROBORATE 30 MILLICURIE: 1 INJECTION, POWDER, LYOPHILIZED, FOR SOLUTION INTRAVENOUS at 14:41

## 2018-07-02 RX ADMIN — REGADENOSON 0.4 MG: 0.08 INJECTION, SOLUTION INTRAVENOUS at 14:40

## 2018-07-02 RX ADMIN — TETRAKIS(2-METHOXYISOBUTYLISOCYANIDE)COPPER(I) TETRAFLUOROBORATE 10 MILLICURIE: 1 INJECTION, POWDER, LYOPHILIZED, FOR SOLUTION INTRAVENOUS at 14:40

## 2018-07-03 LAB
LV EF: 50 %
LVEF MODALITY: NORMAL

## 2018-07-05 ENCOUNTER — TELEPHONE (OUTPATIENT)
Dept: CARDIOLOGY | Age: 68
End: 2018-07-05

## 2018-07-05 NOTE — TELEPHONE ENCOUNTER
Called and spoke with patient, advised lexiscan results per Dr. Malena Washington. Have cath scheduled for Thursday 7/12/18 @ 130. Gave instructions, verbally understood,.

## 2018-07-10 ENCOUNTER — HOSPITAL ENCOUNTER (OUTPATIENT)
Age: 68
Setting detail: OBSERVATION
Discharge: HOME | End: 2018-07-11
Attending: EMERGENCY MEDICINE | Admitting: INTERNAL MEDICINE
Payer: MEDICARE

## 2018-07-10 ENCOUNTER — APPOINTMENT (OUTPATIENT)
Dept: GENERAL RADIOLOGY | Age: 68
End: 2018-07-10
Payer: MEDICARE

## 2018-07-10 DIAGNOSIS — R94.39 ABNORMAL STRESS TEST: ICD-10-CM

## 2018-07-10 DIAGNOSIS — R07.9 CHEST PAIN, UNSPECIFIED TYPE: Primary | ICD-10-CM

## 2018-07-10 LAB
ALBUMIN SERPL-MCNC: 4.7 G/DL (ref 3.5–5.2)
ALP BLD-CCNC: 68 U/L (ref 40–130)
ALT SERPL-CCNC: 10 U/L (ref 5–41)
ANION GAP SERPL CALCULATED.3IONS-SCNC: 12 MMOL/L (ref 7–19)
AST SERPL-CCNC: 15 U/L (ref 5–40)
BASOPHILS ABSOLUTE: 0.1 K/UL (ref 0–0.2)
BASOPHILS RELATIVE PERCENT: 0.8 % (ref 0–1)
BILIRUB SERPL-MCNC: 0.9 MG/DL (ref 0.2–1.2)
BUN BLDV-MCNC: 10 MG/DL (ref 8–23)
CALCIUM SERPL-MCNC: 10.1 MG/DL (ref 8.8–10.2)
CHLORIDE BLD-SCNC: 98 MMOL/L (ref 98–111)
CO2: 30 MMOL/L (ref 22–29)
CREAT SERPL-MCNC: 0.9 MG/DL (ref 0.5–1.2)
EOSINOPHILS ABSOLUTE: 0.1 K/UL (ref 0–0.6)
EOSINOPHILS RELATIVE PERCENT: 1.9 % (ref 0–5)
GFR NON-AFRICAN AMERICAN: >60
GLUCOSE BLD-MCNC: 110 MG/DL (ref 74–109)
HCT VFR BLD CALC: 44.9 % (ref 42–52)
HEMOGLOBIN: 14.8 G/DL (ref 14–18)
INR BLD: 1.03 (ref 0.88–1.18)
LYMPHOCYTES ABSOLUTE: 2.7 K/UL (ref 1.1–4.5)
LYMPHOCYTES RELATIVE PERCENT: 36.9 % (ref 20–40)
MCH RBC QN AUTO: 31.1 PG (ref 27–31)
MCHC RBC AUTO-ENTMCNC: 33 G/DL (ref 33–37)
MCV RBC AUTO: 94.3 FL (ref 80–94)
MONOCYTES ABSOLUTE: 0.7 K/UL (ref 0–0.9)
MONOCYTES RELATIVE PERCENT: 9.2 % (ref 0–10)
NEUTROPHILS ABSOLUTE: 3.8 K/UL (ref 1.5–7.5)
NEUTROPHILS RELATIVE PERCENT: 50.8 % (ref 50–65)
PDW BLD-RTO: 12.3 % (ref 11.5–14.5)
PLATELET # BLD: 266 K/UL (ref 130–400)
PMV BLD AUTO: 9.9 FL (ref 9.4–12.4)
POTASSIUM SERPL-SCNC: 3.8 MMOL/L (ref 3.5–5)
PRO-BNP: 416 PG/ML (ref 0–900)
PROTHROMBIN TIME: 13.4 SEC (ref 12–14.6)
RBC # BLD: 4.76 M/UL (ref 4.7–6.1)
SODIUM BLD-SCNC: 140 MMOL/L (ref 136–145)
TOTAL PROTEIN: 8 G/DL (ref 6.6–8.7)
TROPONIN: <0.01 NG/ML (ref 0–0.03)
TROPONIN: <0.01 NG/ML (ref 0–0.03)
WBC # BLD: 7.4 K/UL (ref 4.8–10.8)

## 2018-07-10 PROCEDURE — G0378 HOSPITAL OBSERVATION PER HR: HCPCS

## 2018-07-10 PROCEDURE — 83880 ASSAY OF NATRIURETIC PEPTIDE: CPT

## 2018-07-10 PROCEDURE — 84484 ASSAY OF TROPONIN QUANT: CPT

## 2018-07-10 PROCEDURE — 93005 ELECTROCARDIOGRAM TRACING: CPT

## 2018-07-10 PROCEDURE — 71046 X-RAY EXAM CHEST 2 VIEWS: CPT

## 2018-07-10 PROCEDURE — 85610 PROTHROMBIN TIME: CPT

## 2018-07-10 PROCEDURE — 80053 COMPREHEN METABOLIC PANEL: CPT

## 2018-07-10 PROCEDURE — 36415 COLL VENOUS BLD VENIPUNCTURE: CPT

## 2018-07-10 PROCEDURE — 85025 COMPLETE CBC W/AUTO DIFF WBC: CPT

## 2018-07-10 PROCEDURE — 2580000003 HC RX 258: Performed by: INTERNAL MEDICINE

## 2018-07-10 PROCEDURE — 99285 EMERGENCY DEPT VISIT HI MDM: CPT | Performed by: EMERGENCY MEDICINE

## 2018-07-10 PROCEDURE — 99220 PR INITIAL OBSERVATION CARE/DAY 70 MINUTES: CPT | Performed by: INTERNAL MEDICINE

## 2018-07-10 PROCEDURE — 99285 EMERGENCY DEPT VISIT HI MDM: CPT

## 2018-07-10 PROCEDURE — 6370000000 HC RX 637 (ALT 250 FOR IP): Performed by: EMERGENCY MEDICINE

## 2018-07-10 RX ORDER — FUROSEMIDE 80 MG
80 TABLET ORAL DAILY
Status: DISCONTINUED | OUTPATIENT
Start: 2018-07-11 | End: 2018-07-11 | Stop reason: HOSPADM

## 2018-07-10 RX ORDER — POTASSIUM CHLORIDE 750 MG/1
10 TABLET, EXTENDED RELEASE ORAL NIGHTLY
Status: DISCONTINUED | OUTPATIENT
Start: 2018-07-10 | End: 2018-07-11 | Stop reason: HOSPADM

## 2018-07-10 RX ORDER — SODIUM CHLORIDE 0.9 % (FLUSH) 0.9 %
10 SYRINGE (ML) INJECTION PRN
Status: DISCONTINUED | OUTPATIENT
Start: 2018-07-10 | End: 2018-07-11 | Stop reason: HOSPADM

## 2018-07-10 RX ORDER — ASPIRIN 81 MG/1
162 TABLET, CHEWABLE ORAL ONCE
Status: COMPLETED | OUTPATIENT
Start: 2018-07-10 | End: 2018-07-10

## 2018-07-10 RX ORDER — ASPIRIN 81 MG/1
81 TABLET, CHEWABLE ORAL DAILY
Status: DISCONTINUED | OUTPATIENT
Start: 2018-07-11 | End: 2018-07-11 | Stop reason: HOSPADM

## 2018-07-10 RX ORDER — ASPIRIN 81 MG/1
81 TABLET, CHEWABLE ORAL DAILY
Status: DISCONTINUED | OUTPATIENT
Start: 2018-07-10 | End: 2018-07-10 | Stop reason: SDUPTHER

## 2018-07-10 RX ORDER — SODIUM CHLORIDE 0.9 % (FLUSH) 0.9 %
10 SYRINGE (ML) INJECTION EVERY 12 HOURS SCHEDULED
Status: DISCONTINUED | OUTPATIENT
Start: 2018-07-10 | End: 2018-07-10 | Stop reason: SDUPTHER

## 2018-07-10 RX ORDER — SODIUM CHLORIDE 0.9 % (FLUSH) 0.9 %
10 SYRINGE (ML) INJECTION EVERY 12 HOURS SCHEDULED
Status: DISCONTINUED | OUTPATIENT
Start: 2018-07-10 | End: 2018-07-11 | Stop reason: HOSPADM

## 2018-07-10 RX ORDER — SODIUM CHLORIDE 0.9 % (FLUSH) 0.9 %
10 SYRINGE (ML) INJECTION PRN
Status: DISCONTINUED | OUTPATIENT
Start: 2018-07-10 | End: 2018-07-10 | Stop reason: SDUPTHER

## 2018-07-10 RX ORDER — ONDANSETRON 2 MG/ML
4 INJECTION INTRAMUSCULAR; INTRAVENOUS EVERY 6 HOURS PRN
Status: DISCONTINUED | OUTPATIENT
Start: 2018-07-10 | End: 2018-07-11 | Stop reason: HOSPADM

## 2018-07-10 RX ADMIN — Medication 10 ML: at 22:35

## 2018-07-10 RX ADMIN — ASPIRIN 81 MG 162 MG: 81 TABLET ORAL at 14:42

## 2018-07-10 ASSESSMENT — PAIN SCALES - GENERAL
PAINLEVEL_OUTOF10: 0

## 2018-07-10 ASSESSMENT — ENCOUNTER SYMPTOMS
BACK PAIN: 0
ABDOMINAL PAIN: 0
SHORTNESS OF BREATH: 1

## 2018-07-10 NOTE — ED PROVIDER NOTES
[facility] EMERGENCY DEPT  eMERGENCY dEPARTMENT eNCOUnter      Pt Name: Joe Leos  MRN: [id number]  [place] 1950  Date of evaluation: 7/10/2018  Provider: Yovana Carson MD    CHIEF COMPLAINT       Chief Complaint   Patient presents with    Chest Pain     chest pain onset this am, pain is intermittnet, \" [address]" reports hx a fib, and heart has been \" OUT OF RYTHM  A FEW Grant Smith"  pt reports is scheduled for heart cath in 2 days with dr Charly Duran, pt had a stress test last monday, then called him and scheduled heart cath         HISTORY OF PRESENT ILLNESS   (Location/Symptom, Timing/Onset, Context/Setting, Quality, Duration, Modifying Factors, Severity)  Note limiting factors. Joe Leos is a 79 y.o. male who presents to the emergency department With chest pain. The patient complains of fleeting heart chest pain that comes on for seconds. He does have to be doing anything. It's associate with shortness of breath. The patient denies any current pain. The patient has a history of paroxysmal A. fib. He is in rhythm he goes in and out of rhythm frequently. He takes blood thinners. The patient had a stress test last week it was abnormal he supposed to have a heart catheterization on Thursday. The patient presents to the ER secondary to having chest pain today. He appears in no acute distress. Patient has a history of CABG in 2013. The history is provided by the patient, the spouse and medical records. Nursing Notes were reviewed. REVIEW OF SYSTEMS    (2-9 systems for level 4, 10 or more for level 5)     Review of Systems   Constitutional: Negative for fever. Respiratory: Positive for shortness of breath. Cardiovascular: Positive for chest pain. Gastrointestinal: Negative for abdominal pain. Genitourinary: Negative for dysuria. Musculoskeletal: Negative for back pain. Neurological: Negative for seizures and syncope. Psychiatric/Behavioral: Negative for confusion.        A complete review of systems was performed and is negative except as noted above in the HPI. PAST MEDICAL HISTORY     Past Medical History:   Diagnosis Date    Atrial fibrillation ([facility]     [facility]     CAD (coronary artery disease)     CAD (coronary atherosclerotic disease)     COPD [facility]ch[facility] obstructive [facility] disease) [facility]     Diabetes mellitus ([facility]     Ex-smoker     quit 2013 /smoked 50 yrs    History of amiodarone therapy     [facility]     [facility]      Hypertension     Hypokalemia     Hypotension     MI (myocardial infarction)     S/P CABG x 4 11/11/2013 6/27/13    Stroke ([facility] 08/22/2017    eye         SURGICAL HISTORY       Past Surgical History:   Procedure Laterality Date    ABLATION OF DYSRHYTHMIC FOCUS      CARDIAC CATHETERIZATION  6/27/13  MDL    EF 45%    CARDIAC SURGERY      CABG x 4    CATARACT REMOVAL WITH IMPLANT Bilateral     CORONARY ARTERY BYPASS GRAFT  6/27/2013     Emergency CABG x 4, LIMA-DIAG, SVG-LAD, SVG-OM, SVG-PDA, RT EVH, DR DOLAN    CYST INCISION AND DRAINAGE N/A     RECTAL    EYE SURGERY      EYE SURGERY      cataract sx and implants (both eyes)    OTHER SURGICAL HISTORY      heart ablation    RETROPHYARYNGEAL ABCESS INCISION/DRAIN      Abcess near rectum         CURRENT MEDICATIONS       Previous Medications    ASPIRIN 81 MG TABLET    Take 81 mg by mouth daily    ATORVASTATIN (LIPITOR) 20 MG TABLET    Take 1/2 tablet daily    CHOLECALCIFEROL (VITAMIN D) 2000 UNITS CAPS CAPSULE    nightly Take 1/2 tablet daily    ELIQUIS 5 MG TABS TABLET    TAKE 1 TABLET BY MOUTH 2 TIMES DAILY    FUROSEMIDE (LASIX) 80 MG TABLET    Take 1 tablet by mouth daily    LISINOPRIL (PRINIVIL;ZESTRIL) 5 MG TABLET    TAKE 1/2 TABLET BY MOUTH 2 TIMES DAILY    METOPROLOL SUCCINATE (TOPROL XL) 25 MG EXTENDED RELEASE TABLET    Take 1/2 tablet twice daily    POTASSIUM CHLORIDE (MICRO-K) 10 MEQ EXTENDED RELEASE CAPSULE    Take 10 mEq by mouth the MRSA from about Dr. Liberty Garg. Given aspirin. Patient is pain-free. We will admit the patient and the PCU to Dr. Liberty Garg for cardiac catheterization. Impression Nuc Stress test:   Impression:  There is inferior MI + ischemia, with a calculated ejection fraction  of 50 % with a 12 % of ischemic burden. Suggest: cardiac catheterizatioin  Signed by Dr Ellie Martinez on 7/3/2018 12:21 PM           Amount and/or Complexity of Data Reviewed  Clinical lab tests: ordered and reviewed  Tests in the radiology section of CPT®: ordered and reviewed  Decide to obtain previous medical records or to obtain history from someone other than the patient: yes  Obtain history from someone other than the patient: yes  Discuss the patient with other providers: yes  Independent visualization of images, tracings, or specimens: yes    Risk of Complications, Morbidity, and/or Mortality  Presenting problems: high    Patient Progress  Patient progress: stable        CONSULTS:  IP CONSULT TO CARDIOLOGY    PROCEDURES:  Unless otherwise noted below, none     Procedures    FINAL IMPRESSION      1. Chest pain, unspecified type    2. Abnormal stress test          DISPOSITION/PLAN   DISPOSITION Decision To Admit 07/10/2018 01:59:42 PM      PATIENT REFERRED TO:  No follow-up provider specified.     DISCHARGE MEDICATIONS:  New Prescriptions    No medications on file          (Please note that portions of this note were completed with a voice recognition program.  Efforts were made to edit the dictations but occasionally words are mis-transcribed.)    Delroy Vaughn MD (electronically signed)  Attending Emergency Physician         Delroy Vaughn MD  07/10/18 0523

## 2018-07-11 VITALS
OXYGEN SATURATION: 98 % | TEMPERATURE: 97 F | DIASTOLIC BLOOD PRESSURE: 76 MMHG | SYSTOLIC BLOOD PRESSURE: 112 MMHG | RESPIRATION RATE: 14 BRPM | WEIGHT: 221.2 LBS | BODY MASS INDEX: 29.96 KG/M2 | HEART RATE: 58 BPM | HEIGHT: 72 IN

## 2018-07-11 LAB
BASOPHILS ABSOLUTE: 0.1 K/UL (ref 0–0.2)
BASOPHILS RELATIVE PERCENT: 0.8 % (ref 0–1)
CHOLESTEROL, TOTAL: 126 MG/DL (ref 160–199)
EOSINOPHILS ABSOLUTE: 0.2 K/UL (ref 0–0.6)
EOSINOPHILS RELATIVE PERCENT: 2.4 % (ref 0–5)
GLUCOSE BLD-MCNC: 106 MG/DL (ref 70–99)
GLUCOSE BLD-MCNC: 125 MG/DL (ref 70–99)
GLUCOSE BLD-MCNC: 129 MG/DL (ref 70–99)
GLUCOSE BLD-MCNC: 74 MG/DL (ref 70–99)
HCT VFR BLD CALC: 40.3 % (ref 42–52)
HDLC SERPL-MCNC: 35 MG/DL (ref 55–121)
HEMOGLOBIN: 13.1 G/DL (ref 14–18)
INR BLD: 1.2 (ref 0.88–1.18)
LDL CHOLESTEROL CALCULATED: 62 MG/DL
LV EF: 35 %
LVEF MODALITY: NORMAL
LYMPHOCYTES ABSOLUTE: 3.1 K/UL (ref 1.1–4.5)
LYMPHOCYTES RELATIVE PERCENT: 38.8 % (ref 20–40)
MCH RBC QN AUTO: 31.3 PG (ref 27–31)
MCHC RBC AUTO-ENTMCNC: 32.5 G/DL (ref 33–37)
MCV RBC AUTO: 96.2 FL (ref 80–94)
MONOCYTES ABSOLUTE: 0.9 K/UL (ref 0–0.9)
MONOCYTES RELATIVE PERCENT: 11.2 % (ref 0–10)
NEUTROPHILS ABSOLUTE: 3.6 K/UL (ref 1.5–7.5)
NEUTROPHILS RELATIVE PERCENT: 46.3 % (ref 50–65)
PDW BLD-RTO: 12.3 % (ref 11.5–14.5)
PERFORMED ON: ABNORMAL
PERFORMED ON: NORMAL
PLATELET # BLD: 243 K/UL (ref 130–400)
PMV BLD AUTO: 10.3 FL (ref 9.4–12.4)
PROTHROMBIN TIME: 15.1 SEC (ref 12–14.6)
RBC # BLD: 4.19 M/UL (ref 4.7–6.1)
TRIGL SERPL-MCNC: 143 MG/DL (ref 0–149)
TROPONIN: <0.01 NG/ML (ref 0–0.03)
TROPONIN: <0.01 NG/ML (ref 0–0.03)
WBC # BLD: 7.9 K/UL (ref 4.8–10.8)

## 2018-07-11 PROCEDURE — 36415 COLL VENOUS BLD VENIPUNCTURE: CPT

## 2018-07-11 PROCEDURE — 93459 L HRT ART/GRFT ANGIO: CPT | Performed by: INTERNAL MEDICINE

## 2018-07-11 PROCEDURE — 84484 ASSAY OF TROPONIN QUANT: CPT

## 2018-07-11 PROCEDURE — G0378 HOSPITAL OBSERVATION PER HR: HCPCS

## 2018-07-11 PROCEDURE — C1760 CLOSURE DEV, VASC: HCPCS

## 2018-07-11 PROCEDURE — 85610 PROTHROMBIN TIME: CPT

## 2018-07-11 PROCEDURE — 2709999900 HC NON-CHARGEABLE SUPPLY

## 2018-07-11 PROCEDURE — 6360000002 HC RX W HCPCS

## 2018-07-11 PROCEDURE — 99024 POSTOP FOLLOW-UP VISIT: CPT | Performed by: INTERNAL MEDICINE

## 2018-07-11 PROCEDURE — 2580000003 HC RX 258: Performed by: INTERNAL MEDICINE

## 2018-07-11 PROCEDURE — 82948 REAGENT STRIP/BLOOD GLUCOSE: CPT

## 2018-07-11 PROCEDURE — 6370000000 HC RX 637 (ALT 250 FOR IP): Performed by: INTERNAL MEDICINE

## 2018-07-11 PROCEDURE — 93005 ELECTROCARDIOGRAM TRACING: CPT

## 2018-07-11 PROCEDURE — 85025 COMPLETE CBC W/AUTO DIFF WBC: CPT

## 2018-07-11 PROCEDURE — 80061 LIPID PANEL: CPT

## 2018-07-11 PROCEDURE — C1894 INTRO/SHEATH, NON-LASER: HCPCS

## 2018-07-11 RX ORDER — LISINOPRIL 5 MG/1
5 TABLET ORAL DAILY
Qty: 30 TABLET | Refills: 3 | Status: SHIPPED | OUTPATIENT
Start: 2018-07-11 | End: 2018-08-14 | Stop reason: SDUPTHER

## 2018-07-11 RX ORDER — METOPROLOL SUCCINATE 25 MG/1
25 TABLET, EXTENDED RELEASE ORAL DAILY
Qty: 30 TABLET | Refills: 3 | Status: SHIPPED | OUTPATIENT
Start: 2018-07-11 | End: 2018-08-14 | Stop reason: SDUPTHER

## 2018-07-11 RX ORDER — ISOSORBIDE MONONITRATE 60 MG/1
60 TABLET, EXTENDED RELEASE ORAL DAILY
Qty: 30 TABLET | Refills: 3 | Status: SHIPPED | OUTPATIENT
Start: 2018-07-11 | End: 2018-07-18 | Stop reason: SDUPTHER

## 2018-07-11 RX ORDER — SODIUM CHLORIDE 0.9 % (FLUSH) 0.9 %
10 SYRINGE (ML) INJECTION PRN
Status: DISCONTINUED | OUTPATIENT
Start: 2018-07-11 | End: 2018-07-11 | Stop reason: HOSPADM

## 2018-07-11 RX ORDER — ATORVASTATIN CALCIUM 40 MG/1
80 TABLET, FILM COATED ORAL DAILY
Qty: 30 TABLET | Refills: 3 | Status: SHIPPED | OUTPATIENT
Start: 2018-07-11 | End: 2018-08-14 | Stop reason: SDUPTHER

## 2018-07-11 RX ORDER — SODIUM CHLORIDE 0.9 % (FLUSH) 0.9 %
10 SYRINGE (ML) INJECTION EVERY 12 HOURS SCHEDULED
Status: DISCONTINUED | OUTPATIENT
Start: 2018-07-11 | End: 2018-07-11 | Stop reason: HOSPADM

## 2018-07-11 RX ORDER — LISINOPRIL 5 MG/1
5 TABLET ORAL DAILY
Status: DISCONTINUED | OUTPATIENT
Start: 2018-07-11 | End: 2018-07-11 | Stop reason: HOSPADM

## 2018-07-11 RX ORDER — ISOSORBIDE MONONITRATE 60 MG/1
60 TABLET, EXTENDED RELEASE ORAL DAILY
Status: DISCONTINUED | OUTPATIENT
Start: 2018-07-11 | End: 2018-07-11 | Stop reason: HOSPADM

## 2018-07-11 RX ORDER — METOPROLOL SUCCINATE 25 MG/1
25 TABLET, EXTENDED RELEASE ORAL DAILY
Status: DISCONTINUED | OUTPATIENT
Start: 2018-07-11 | End: 2018-07-11 | Stop reason: HOSPADM

## 2018-07-11 RX ADMIN — ASPIRIN 81 MG CHEWABLE TABLET 81 MG: 81 TABLET CHEWABLE at 08:50

## 2018-07-11 RX ADMIN — FUROSEMIDE 80 MG: 80 TABLET ORAL at 08:51

## 2018-07-11 RX ADMIN — Medication 10 ML: at 08:51

## 2018-07-11 ASSESSMENT — PAIN SCALES - GENERAL: PAINLEVEL_OUTOF10: 0

## 2018-07-11 NOTE — DISCHARGE SUMMARY
of chest discomfort. He has been experiencing some chest discomfort, mayur a fleeting like pain in the central chest.  The chest discomfort is not exertional and there are no obvious precipitating or relieving features. There were no associated manifestations such as nausea, vomiting, diaphoresis, presyncope, syncope, dizzyness, weakness, cold sweats, or shortness of air. With these symptoms and signs, Cardiology was asked to assist in treatment. When being examined this afternoon, he has had no symptoms of exertional chest discomfort, unusual or change in shortness of air, presyncope or syncope. He had a stress test (lexiscan) which was positive for ischemia and he was scheduled for an elective outpatient cardiac catheterization, but came in early. Hospital Course: The patient underwent cardiac catheterization according to the following criteria:  7/2/2018 lexiscan Positive for inferior MI + myocardial ischemia, EF 50%, 12% ischemic myocardium on stress, intermediate risk findings, AUC indication 17, AUC score 7, Diagnostic Catheterization Appropriate Use Criteria Description, [facility] 2012;[id number]. Selective left heart and coronary arteriography was preformed, which revealed:    1. Successful femoral artery ultrasound  2. Successful femoral artery arteriogram  3. Severe triple vessel coronary artery disease  4. Left ventricular function is impaired in the anterior lateral segment with a visually estimated ejection fraction of 35%  5. Patent left ASHWIN to the diagonal  6. Patent, yet extremely small, VG - LAD  7. Patent VG - RCA  8.  100% occlusion of the vein graft to the obtuse marginal  9. Patent un grafted right ASHWIN. There were no apparent complications. The rest of the patient's hospitalization was uneventful. He was discharged home on medical therapy with outpatient follow up. Consults:     IP CONSULT TO CARDIOLOGY      Significant Diagnostic Studies:    1.  Selective

## 2018-07-11 NOTE — ED NOTES
Tried to call report. Nurse was still report and is going to call me back.       Belgica Parker, RN  07/10/18 5680

## 2018-07-11 NOTE — PLAN OF CARE
Problem: Falls - Risk of:  Goal: Will remain free from falls  Will remain free from falls   Outcome: Ongoing    Goal: Absence of physical injury  Absence of physical injury   Outcome: Ongoing      Problem: Pain:  Goal: Pain level will decrease  Pain level will decrease  Outcome: Ongoing    Goal: Control of acute pain  Control of acute pain  Outcome: Ongoing    Goal: Control of chronic pain  Control of chronic pain  Outcome: Ongoing

## 2018-07-11 NOTE — H&P
CAD (coronary atherosclerotic disease)        Persistent atrial fibrillation (HCC)        Persistent atrial fibrillation (HCC)        Coronary artery disease involving native coronary artery of native heart with unstable angina pectoris (HCC)        Paroxysmal atrial fibrillation (HCC)        Syncope and collapse        Typical atrial flutter (HCC)        Chronic combined systolic and diastolic heart failure (HCC)        Sick sinus syndrome ([facility]        Amaurosis fugax        Essential hypertension                PRIOR CARDIAC PROBLEM LIST  (IF APPLICABLE):    63/42/7171 CABG X4 LIMA-diag, VG-LAD, VG-OM, VG-PDA Yoli Fus)  11/10/2015  SE negative for myocardial ischemia  6/24/2016  TTE  Normal LVFX  7/2/2018 lexiscan Positive for inferior MI + myocardial ischemia, EF 50%, 12% ischemic myocardium on stress, intermediate risk findings, AUC indication 17, AUC score 7        Past Medical History:    Past Medical History:   Diagnosis Date    Atrial fibrillation (HCC)     Atrial flutter (HCC)     CAD (coronary artery disease)     CAD (coronary atherosclerotic disease)     COPD (chronic obstructive pulmonary disease) ([facility]     [facility]     Ex-smoker     quit 2013 /smoked 50 yrs    History of amiodarone therapy     [facility]     [facility]      Hypertension     Hypokalemia     Hypotension     MI (myocardial infarction)     S/P CABG x 4 11/11/2013 6/27/13    Stroke ([facility] 08/22/2017    eye         Past Surgical History:    Past Surgical History:   Procedure Laterality Date    ABLATION OF DYSRHYTHMIC FOCUS      CARDIAC CATHETERIZATION  6/27/13  MDL    EF 45%    CARDIAC SURGERY      CABG x 4    CATARACT REMOVAL WITH IMPLANT Bilateral     CORONARY ARTERY BYPASS GRAFT  6/27/2013     Emergency CABG x 4, LIMA-DIAG, SVG-LAD, SVG-OM, SVG-PDA, RT EVH, DR DOLAN    CYST INCISION AND DRAINAGE N/A     RECTAL    EYE SURGERY      EYE SURGERY      cataract sx and implants Disease Father     High Blood Pressure Father     High Cholesterol Father     Other Father     Heart Disease Other          REVIEW OF SYSTEMS:     Except as noted in the HPI, all other systems are negative        PHYSICAL EXAMINATION:     /80   Pulse 75   Temp 96.8 °F (36 °C) (Temporal)   Resp 14   Ht 6' (1.829 m)   Wt 221 lb 3.2 oz (100.3 kg)   SpO2 98%   BMI 30.00 kg/m²     GENERAL - well developed and well nourished, in no amount of generalized distress; is an active participant in this examination  HEENT   PERRLA, Hearing appears normal, conjunctiva and lids are normal, ears and nose appear normal  NECK - no thyromegaly, no JVD, trachea is in the midline  CARDIOVASCULAR  PMI is in the left mid line clavicular position, Normal S1 and S2 with a grade 1/6 systolic murmur. No S3 or S4    PULMONARY  No respiratory distress. scattered wheezes and rales.   Breath sounds in both  lung fields are Decreased  ABDOMEN   soft, non tender, no rebound, no hepatomegaly or splenomegaly  MUSCULOSKELETAL   Prone/Supine, digitals and nails are without clubbing or cyanosis  EXTREMITIES - trace edema  NEUROLOGIC - cranial nerves, II-XII, are normal  SKIN - turgor is normal, no rash  PSYCHIATRIC - normal mood and affect, alert and orientated x 3, judgement and insight appear appropriate      LABORATORY EVALUATION & TESTING:    I have personally reviewed and interpreted the results of the following diagnostic testing      EKG and or Telemetry:  which was personally reviewed me:  Sinus rhythm,   Pulse Readings from Last 1 Encounters:   07/10/18 75    bpm,  without Acute changes    Troponin:  negative myocardial necrosis (  <0.01); the creatinine is normal    CBC:   Recent Labs      07/10/18   1330   WBC  7.4   HGB  14.8   HCT  44.9   MCV  94.3*   PLT  266     BMP:   Recent Labs      07/10/18   1330   NA  140   K  3.8   CL  98   CO2  30*   BUN  10   CREATININE  0.9     Cardiac Enzymes:   Recent Labs      07/10/18 to participate in the care of Symone Mills    Electronically signed by Nellie Matute MD on 7/10/18     [facility] [place]

## 2018-07-12 ENCOUNTER — HOSPITAL ENCOUNTER (OUTPATIENT)
Dept: CARDIAC CATH/INVASIVE PROCEDURES | Age: 68
Discharge: HOME | End: 2018-07-12
Payer: MEDICARE

## 2018-07-12 LAB
EKG P AXIS: 37 DEGREES
EKG P AXIS: 51 DEGREES
EKG P AXIS: 54 DEGREES
EKG P-R INTERVAL: 204 MS
EKG P-R INTERVAL: 206 MS
EKG P-R INTERVAL: 216 MS
EKG Q-T INTERVAL: 388 MS
EKG Q-T INTERVAL: 406 MS
EKG Q-T INTERVAL: 418 MS
EKG QRS DURATION: 100 MS
EKG QRS DURATION: 86 MS
EKG QRS DURATION: 88 MS
EKG QTC CALCULATION (BAZETT): 398 MS
EKG QTC CALCULATION (BAZETT): 418 MS
EKG QTC CALCULATION (BAZETT): 423 MS
EKG T AXIS: 29 DEGREES
EKG T AXIS: 29 DEGREES
EKG T AXIS: 46 DEGREES

## 2018-07-18 RX ORDER — ISOSORBIDE MONONITRATE 60 MG/1
60 TABLET, EXTENDED RELEASE ORAL DAILY
Qty: 30 TABLET | Refills: 5 | Status: ON HOLD | OUTPATIENT
Start: 2018-07-18 | End: 2019-01-25 | Stop reason: HOSPADM

## 2018-07-19 ENCOUNTER — TELEPHONE (OUTPATIENT)
Dept: CARDIOLOGY | Age: 68
End: 2018-07-19

## 2018-07-19 NOTE — TELEPHONE ENCOUNTER
Pt (MRN: [id number]) came into office needing script isorbide mononitrate (IMDUR) 60mg (5 refills) to be sent to [facility] @ [facility] outpatient [facility] @ [address] [place] [address], [address] Pt is wanting script faxed to [phone] is requesting progress note from Nannette Cai office. Pt is wanting to be contacted once script and progress note have both been sent over to [place].     Best way to reach pt:    [phone]  (Pt informed me that home phone no longer works)    Hanna Villanueva

## 2018-07-19 NOTE — TELEPHONE ENCOUNTER
Called and let patient know that Rx was sent to [facility] yesterday. Patient verbally understood.

## 2018-07-24 ENCOUNTER — HOSPITAL ENCOUNTER (OUTPATIENT)
Dept: MRI IMAGING | Age: 68
Discharge: HOME | End: 2018-07-24
Payer: MEDICARE

## 2018-07-24 DIAGNOSIS — M75.122 COMPLETE ROTATOR CUFF RUPTURE OF LEFT SHOULDER: ICD-10-CM

## 2018-07-24 DIAGNOSIS — M25.512 ACUTE PAIN OF LEFT SHOULDER: ICD-10-CM

## 2018-07-24 PROCEDURE — 73221 MRI JOINT UPR EXTREM W/O DYE: CPT

## 2018-08-10 ENCOUNTER — APPOINTMENT (OUTPATIENT)
Dept: GENERAL RADIOLOGY | Age: 68
End: 2018-08-10
Payer: MEDICARE

## 2018-08-10 ENCOUNTER — HOSPITAL ENCOUNTER (EMERGENCY)
Age: 68
Discharge: HOME | End: 2018-08-10
Attending: EMERGENCY MEDICINE
Payer: MEDICARE

## 2018-08-10 VITALS
DIASTOLIC BLOOD PRESSURE: 66 MMHG | HEIGHT: 72 IN | WEIGHT: 215 LBS | HEART RATE: 63 BPM | BODY MASS INDEX: 29.12 KG/M2 | SYSTOLIC BLOOD PRESSURE: 102 MMHG | RESPIRATION RATE: 17 BRPM | OXYGEN SATURATION: 96 % | TEMPERATURE: 98.5 F

## 2018-08-10 DIAGNOSIS — I25.118 CORONARY ARTERY DISEASE OF NATIVE HEART WITH STABLE ANGINA PECTORIS, UNSPECIFIED VESSEL OR LESION TYPE (HCC): Primary | ICD-10-CM

## 2018-08-10 LAB
ALBUMIN SERPL-MCNC: 4.3 G/DL (ref 3.5–5.2)
ALP BLD-CCNC: 68 U/L (ref 40–130)
ALT SERPL-CCNC: 10 U/L (ref 5–41)
ANION GAP SERPL CALCULATED.3IONS-SCNC: 12 MMOL/L (ref 7–19)
APTT: 27.9 SEC (ref 26–36.2)
AST SERPL-CCNC: 16 U/L (ref 5–40)
BASOPHILS ABSOLUTE: 0.1 K/UL (ref 0–0.2)
BASOPHILS RELATIVE PERCENT: 0.7 % (ref 0–1)
BILIRUB SERPL-MCNC: 0.5 MG/DL (ref 0.2–1.2)
BUN BLDV-MCNC: 14 MG/DL (ref 8–23)
CALCIUM SERPL-MCNC: 9.5 MG/DL (ref 8.8–10.2)
CHLORIDE BLD-SCNC: 100 MMOL/L (ref 98–111)
CO2: 25 MMOL/L (ref 22–29)
CREAT SERPL-MCNC: 0.7 MG/DL (ref 0.5–1.2)
EOSINOPHILS ABSOLUTE: 0.2 K/UL (ref 0–0.6)
EOSINOPHILS RELATIVE PERCENT: 1.7 % (ref 0–5)
GFR NON-AFRICAN AMERICAN: >60
GLUCOSE BLD-MCNC: 102 MG/DL (ref 74–109)
HCT VFR BLD CALC: 42.5 % (ref 42–52)
HEMOGLOBIN: 14 G/DL (ref 14–18)
INR BLD: 1.09 (ref 0.88–1.18)
LYMPHOCYTES ABSOLUTE: 2.2 K/UL (ref 1.1–4.5)
LYMPHOCYTES RELATIVE PERCENT: 25.8 % (ref 20–40)
MCH RBC QN AUTO: 31 PG (ref 27–31)
MCHC RBC AUTO-ENTMCNC: 32.9 G/DL (ref 33–37)
MCV RBC AUTO: 94 FL (ref 80–94)
MONOCYTES ABSOLUTE: 0.8 K/UL (ref 0–0.9)
MONOCYTES RELATIVE PERCENT: 8.9 % (ref 0–10)
NEUTROPHILS ABSOLUTE: 5.4 K/UL (ref 1.5–7.5)
NEUTROPHILS RELATIVE PERCENT: 62.4 % (ref 50–65)
PDW BLD-RTO: 12.1 % (ref 11.5–14.5)
PERFORMED ON: NORMAL
PERFORMED ON: NORMAL
PLATELET # BLD: 240 K/UL (ref 130–400)
PMV BLD AUTO: 10.5 FL (ref 9.4–12.4)
POC TROPONIN I: 0.03 NG/ML (ref 0–0.08)
POC TROPONIN I: 0.03 NG/ML (ref 0–0.08)
POTASSIUM SERPL-SCNC: 3.4 MMOL/L (ref 3.5–5)
PRO-BNP: 331 PG/ML (ref 0–900)
PROTHROMBIN TIME: 14 SEC (ref 12–14.6)
RBC # BLD: 4.52 M/UL (ref 4.7–6.1)
SODIUM BLD-SCNC: 137 MMOL/L (ref 136–145)
TOTAL PROTEIN: 7.5 G/DL (ref 6.6–8.7)
WBC # BLD: 8.7 K/UL (ref 4.8–10.8)

## 2018-08-10 PROCEDURE — 71045 X-RAY EXAM CHEST 1 VIEW: CPT

## 2018-08-10 PROCEDURE — 83880 ASSAY OF NATRIURETIC PEPTIDE: CPT

## 2018-08-10 PROCEDURE — 6370000000 HC RX 637 (ALT 250 FOR IP): Performed by: NURSE PRACTITIONER

## 2018-08-10 PROCEDURE — 99285 EMERGENCY DEPT VISIT HI MDM: CPT

## 2018-08-10 PROCEDURE — 85025 COMPLETE CBC W/AUTO DIFF WBC: CPT

## 2018-08-10 PROCEDURE — 93005 ELECTROCARDIOGRAM TRACING: CPT

## 2018-08-10 PROCEDURE — 85730 THROMBOPLASTIN TIME PARTIAL: CPT

## 2018-08-10 PROCEDURE — 85610 PROTHROMBIN TIME: CPT

## 2018-08-10 PROCEDURE — 2580000003 HC RX 258: Performed by: NURSE PRACTITIONER

## 2018-08-10 PROCEDURE — 84484 ASSAY OF TROPONIN QUANT: CPT

## 2018-08-10 PROCEDURE — 99285 EMERGENCY DEPT VISIT HI MDM: CPT | Performed by: NURSE PRACTITIONER

## 2018-08-10 PROCEDURE — 36415 COLL VENOUS BLD VENIPUNCTURE: CPT

## 2018-08-10 PROCEDURE — 80053 COMPREHEN METABOLIC PANEL: CPT

## 2018-08-10 RX ORDER — 0.9 % SODIUM CHLORIDE 0.9 %
500 INTRAVENOUS SOLUTION INTRAVENOUS ONCE
Status: COMPLETED | OUTPATIENT
Start: 2018-08-10 | End: 2018-08-10

## 2018-08-10 RX ORDER — ASPIRIN 81 MG/1
81 TABLET, CHEWABLE ORAL ONCE
Status: COMPLETED | OUTPATIENT
Start: 2018-08-10 | End: 2018-08-10

## 2018-08-10 RX ORDER — NITROGLYCERIN 0.4 MG/1
0.4 TABLET SUBLINGUAL EVERY 5 MIN PRN
Status: DISCONTINUED | OUTPATIENT
Start: 2018-08-10 | End: 2018-08-11 | Stop reason: HOSPADM

## 2018-08-10 RX ORDER — POTASSIUM CHLORIDE 20 MEQ/1
40 TABLET, EXTENDED RELEASE ORAL ONCE
Status: COMPLETED | OUTPATIENT
Start: 2018-08-10 | End: 2018-08-10

## 2018-08-10 RX ADMIN — POTASSIUM CHLORIDE 40 MEQ: 20 TABLET, EXTENDED RELEASE ORAL at 21:57

## 2018-08-10 RX ADMIN — ASPIRIN 81 MG 81 MG: 81 TABLET ORAL at 18:43

## 2018-08-10 RX ADMIN — SODIUM CHLORIDE 500 ML: 9 INJECTION, SOLUTION INTRAVENOUS at 20:06

## 2018-08-10 RX ADMIN — NITROGLYCERIN 0.4 MG: 0.4 TABLET SUBLINGUAL at 18:43

## 2018-08-10 ASSESSMENT — ENCOUNTER SYMPTOMS
COUGH: 0
SORE THROAT: 0
CONSTIPATION: 0
ABDOMINAL PAIN: 0
DIARRHEA: 0
NAUSEA: 1
SHORTNESS OF BREATH: 0
TROUBLE SWALLOWING: 0
RHINORRHEA: 0
VOMITING: 0

## 2018-08-10 ASSESSMENT — PAIN SCALES - GENERAL: PAINLEVEL_OUTOF10: 4

## 2018-08-10 ASSESSMENT — HEART SCORE
ECG: 0
ECG: 0

## 2018-08-10 ASSESSMENT — PAIN DESCRIPTION - LOCATION: LOCATION: CHEST

## 2018-08-10 ASSESSMENT — PAIN DESCRIPTION - PAIN TYPE: TYPE: ACUTE PAIN

## 2018-08-10 ASSESSMENT — PAIN DESCRIPTION - DESCRIPTORS: DESCRIPTORS: PRESSURE

## 2018-08-10 NOTE — ED PROVIDER NOTES
Disease, Hypertension  Risk Factors: > 3 Risk factors or history of atherosclerotic disease*  Troponin: < 1X normal limit  Heart Score Total: 6      PHYSICAL EXAM    (up to 7 for level 4, 8 or more for level 5)     ED Triage Vitals [08/10/18 1802]   BP Temp Temp Source Pulse Resp SpO2 Height Weight   113/74 98.5 °F (36.9 °C) Oral 67 16 93 % 6' (1.829 m) 215 lb (97.5 kg)       Physical Exam   Constitutional: He is oriented to person, place, and time. He appears well-developed and well-nourished. HENT:   Head: Normocephalic and atraumatic. Neck: Normal range of motion. Neck supple. Cardiovascular: Normal rate, regular rhythm, normal heart sounds and intact distal pulses. Pulmonary/Chest: Effort normal and breath sounds normal. He exhibits no tenderness. Abdominal: Soft. Bowel sounds are normal. He exhibits no distension. There is no tenderness. There is no rebound. Neurological: He is alert and oriented to person, place, and time. Skin: Skin is dry. Psychiatric: He has a normal mood and affect. His behavior is normal. Judgment and thought content normal.       DIAGNOSTIC RESULTS     EKG: All EKG's are interpreted by the Emergency Department Physician who either signs or Co-signs this chart in the absence of a cardiologist.    1801 - sinus rhythm without ectopy, ST or T wave changes. There are Q waves in the anterior lateral leads. Rate is 70    2004 - Sinus rhythm without ectopy, ST or T wave changes. Rate is 68. RADIOLOGY:   Non-plain film images such as CT, Ultrasound and MRI are read by the radiologist. Plain radiographic images are visualized and preliminarily interpreted by the emergency physician with the below findings:        Interpretation per the Radiologist below, if available at the time of this note:    XR CHEST PORTABLE   Final Result   Impression: No evidence of acute cardiopulmonary disease.    Signed by Dr Saadia Torres on 8/10/2018 7:07 PM            ED BEDSIDE ULTRASOUND: BERNARDO Martinez (electronically signed)  Attending Emergency Physician           Ana Phillips, [address]  08/10/18 [id number]

## 2018-08-11 NOTE — ED NOTES
Alexandra Antunez down; awaiting pharmacy to bring medications for pt at this time. Yaakov Swenson in pharmacy aware.      José Calvert RN  08/10/18 [id number]

## 2018-08-14 ENCOUNTER — OFFICE VISIT (OUTPATIENT)
Dept: CARDIOLOGY | Age: 68
End: 2018-08-14
Payer: MEDICARE

## 2018-08-14 ENCOUNTER — TELEPHONE (OUTPATIENT)
Dept: CARDIOLOGY | Age: 68
End: 2018-08-14

## 2018-08-14 VITALS
WEIGHT: 220 LBS | HEIGHT: 72 IN | BODY MASS INDEX: 29.8 KG/M2 | SYSTOLIC BLOOD PRESSURE: 120 MMHG | DIASTOLIC BLOOD PRESSURE: 72 MMHG | HEART RATE: 83 BPM

## 2018-08-14 DIAGNOSIS — I25.118 ATHEROSCLEROSIS OF NATIVE CORONARY ARTERY OF NATIVE HEART WITH STABLE ANGINA PECTORIS (HCC): ICD-10-CM

## 2018-08-14 DIAGNOSIS — I48.0 PAROXYSMAL ATRIAL FIBRILLATION (HCC): Primary | ICD-10-CM

## 2018-08-14 DIAGNOSIS — I50.42 CHRONIC COMBINED SYSTOLIC AND DIASTOLIC HEART FAILURE (HCC): ICD-10-CM

## 2018-08-14 PROCEDURE — 93000 ELECTROCARDIOGRAM COMPLETE: CPT | Performed by: CLINICAL NURSE SPECIALIST

## 2018-08-14 PROCEDURE — 99213 OFFICE O/P EST LOW 20 MIN: CPT | Performed by: CLINICAL NURSE SPECIALIST

## 2018-08-14 RX ORDER — ATORVASTATIN CALCIUM 80 MG/1
80 TABLET, FILM COATED ORAL NIGHTLY
Qty: 90 TABLET | Refills: 3 | Status: SHIPPED | OUTPATIENT
Start: 2018-08-14

## 2018-08-14 RX ORDER — POTASSIUM CHLORIDE 750 MG/1
10 CAPSULE, EXTENDED RELEASE ORAL DAILY
Qty: 90 CAPSULE | Refills: 3 | Status: SHIPPED | OUTPATIENT
Start: 2018-08-14 | End: 2018-11-27 | Stop reason: DRUGHIGH

## 2018-08-14 RX ORDER — METOPROLOL SUCCINATE 25 MG/1
25 TABLET, EXTENDED RELEASE ORAL DAILY
Qty: 90 TABLET | Refills: 3 | Status: SHIPPED | OUTPATIENT
Start: 2018-08-14 | End: 2019-02-06 | Stop reason: DRUGHIGH

## 2018-08-14 RX ORDER — LISINOPRIL 5 MG/1
5 TABLET ORAL DAILY
Qty: 90 TABLET | Refills: 3 | Status: SHIPPED | OUTPATIENT
Start: 2018-08-14 | End: 2018-11-27 | Stop reason: DRUGHIGH

## 2018-08-14 ASSESSMENT — ENCOUNTER SYMPTOMS
COUGH: 0
HEARTBURN: 0
BLURRED VISION: 0
NAUSEA: 0
ORTHOPNEA: 0
VOMITING: 0
SHORTNESS OF BREATH: 1

## 2018-08-14 NOTE — TELEPHONE ENCOUNTER
Pt came back into office, he would like to confirm if he can have more than the recommended 6 cups of water restriction that is on his after visit summary. He stated that he can adhere to the 2000mg sodium diet but he works outside most days and drinks way more than 6 cups of water a day to accommodate that. He also drink Lemonade and Orange juice as well. He would like advice on how much he can actually consume.

## 2018-08-14 NOTE — PATIENT INSTRUCTIONS
Return in about 3 months (around 11/14/2018) for Dr. Ben Magallanes. OK to take an extra Lasix for weight gain over 3lbs in 24 hours. If weight is not improving by the next day, call the office.    - Weigh daily and report weight gain of 3lbs or more in 24hrs or 5lbs in one week. - Call for increasing shortness of breath or increasing swelling in feet and legs. (This could mean you are retaining too much fluid)  - 2000mg low sodium diet  - Fluid restriction of 1500ml per day (about 6 cups of fluid per day)      Patient Education        Heart Failure: Care Instructions  Your Care Instructions    Heart failure occurs when your heart does not pump as much blood as the body needs. Failure does not mean that the heart has stopped pumping but rather that it is not pumping as well as it should. Over time, this causes fluid buildup in your lungs and other parts of your body. Fluid buildup can cause shortness of breath, fatigue, swollen ankles, and other problems. By taking medicines regularly, reducing sodium (salt) in your diet, checking your weight every day, and making lifestyle changes, you can feel better and live longer. Follow-up care is a key part of your treatment and safety. Be sure to make and go to all appointments, and call your doctor if you are having problems. It's also a good idea to know your test results and keep a list of the medicines you take. How can you care for yourself at home? Medicines    · Be safe with medicines. Take your medicines exactly as prescribed. Call your doctor if you think you are having a problem with your medicine.     · Do not take any vitamins, over-the-counter medicine, or herbal products without talking to your doctor first. [facility] not take ibuprofen (Advil or Motrin) and naproxen (Aleve) without talking to your doctor first. They could make your heart failure worse.     · You may be taking some of the following medicine.   ¨ Beta-blockers can slow heart rate, decrease blood pushing yourself too hard if you cannot talk while you are exercising. If you become short of breath or dizzy or have chest pain, stop, sit down, and rest.     · If you feel \"wiped out\" the day after you exercise, walk slower or for a shorter distance until you can work up to a better pace.     · Get enough rest at night. Sleeping with 1 or 2 pillows under your upper body and head may help you breathe easier.    Lifestyle changes    · Do not smoke. Smoking can make a heart condition worse. If you need help quitting, talk to your doctor about stop-smoking programs and medicines. These can increase your chances of quitting for good. Quitting smoking may be the most important step you can take to protect your heart.     · Limit alcohol to 2 drinks a day for men and 1 drink a day for women. Too much alcohol can cause health problems.     · Avoid getting sick from colds and the flu. Get a pneumococcal vaccine shot. If you have had one before, ask your doctor whether you need another dose. Get a flu shot each year. If you must be around people with colds or the flu, wash your hands often. When should you call for help? Call 911 if you have symptoms of sudden heart failure such as:    · You have severe trouble breathing.     · You cough up pink, foamy mucus.     · You have a new irregular or rapid heartbeat.    Call your doctor now or seek immediate medical care if:    · You have new or increased shortness of breath.     · You are dizzy or lightheaded, or you feel like you may faint.     · You have sudden weight gain, such as more than 2 to 3 pounds in a day or 5 pounds in a week. (Your doctor may suggest a different range of weight gain.)     · You have increased swelling in your legs, ankles, or feet.     · You are suddenly so tired or weak that you cannot do your usual activities.    Watch closely for changes in your health, and be sure to contact your doctor if you develop new symptoms.   Where can you learn more?  Go to [email or web address] and sign in to your [employer] account. Enter N336 in the [facility] box to learn more about \"Heart Failure: Care Instructions. \"     If you do not have an account, please click on the \"Sign Up Now\" link. Current as of: May 10, 2017  Content Version: 11.7  © 7115-1614 [employer], [facility]. Care instructions adapted under license by [facility] ([facility]). If you have questions about a medical condition or this instruction, always ask your healthcare professional. [address] any warranty or liability for your use of this information.

## 2018-08-14 NOTE — PROGRESS NOTES
[facility], [address] [address]  Phone: [phone]  Fax: [phone]    OFFICE VISIT:  2018    Roger Maria - : 1950    Reason For Visit:  Pa Franco is a 79 y.o. male who is here for CAD    HPI   Patient is here for follow-up with a history of CAD and CABG ×4. He recently had heart catheterization in July and medical treatment was recommended with an EF of 35%. He proceeded back to the emergency room with complaints of chest pain related to an episode where he became very angry. Since that time, he has only hadsome occasional, brief angina that does not require nitroglycerin. There is no signs of fluid overload such as sudden weight gain, orthopnea, PND, or edema. Ochoa Fisher MD is PCP. Roger Maria has the following history as recorded in [facility]:    Patient Active Problem List    Diagnosis Date Noted    Abnormal stress test 07/10/2018    Essential hypertension 2018    Chronic anticoagulation 2018    Amaurosis fugax 10/18/2017    Chronic combined systolic and diastolic heart failure (HCC)     Sick sinus syndrome (HCC)     Syncope and collapse     Typical atrial flutter (HCC)     [facility]     Chest pain 2016    Shortness of breath 2014    Ex-smoker     S/P ablation of atrial fibrillation 2013    History of amiodarone therapy     Sloughing of skin 2013    Hyperlipidemia     CAD (coronary artery disease)      Past Medical History:   Diagnosis Date    Atrial fibrillation (HCC)     Atrial flutter (HCC)     CAD (coronary artery disease)     CAD (coronary atherosclerotic disease)     COPD (chronic obstructive pulmonary disease) ([address]     Diabetes mellitus ([facility]     Ex-smoker     quit  /smoked 50 yrs    History of amiodarone therapy     [facility]     [facility]      Hypertension     Hypokalemia     Hypotension

## 2018-08-15 LAB
EKG P AXIS: 36 DEGREES
EKG P AXIS: 56 DEGREES
EKG P-R INTERVAL: 216 MS
EKG P-R INTERVAL: 220 MS
EKG Q-T INTERVAL: 378 MS
EKG Q-T INTERVAL: 396 MS
EKG QRS DURATION: 96 MS
EKG QRS DURATION: 98 MS
EKG QTC CALCULATION (BAZETT): 393 MS
EKG QTC CALCULATION (BAZETT): 408 MS
EKG T AXIS: 41 DEGREES
EKG T AXIS: 48 DEGREES

## 2018-10-26 ENCOUNTER — TELEPHONE (OUTPATIENT)
Dept: CARDIOLOGY | Age: 68
End: 2018-10-26

## 2018-11-27 ENCOUNTER — OFFICE VISIT (OUTPATIENT)
Dept: CARDIOLOGY | Age: 68
End: 2018-11-27
Payer: MEDICARE

## 2018-11-27 VITALS
BODY MASS INDEX: 30.48 KG/M2 | DIASTOLIC BLOOD PRESSURE: 58 MMHG | HEART RATE: 80 BPM | SYSTOLIC BLOOD PRESSURE: 102 MMHG | HEIGHT: 72 IN | WEIGHT: 225 LBS

## 2018-11-27 DIAGNOSIS — I10 ESSENTIAL HYPERTENSION: Primary | ICD-10-CM

## 2018-11-27 DIAGNOSIS — I25.10 CORONARY ARTERY DISEASE INVOLVING NATIVE CORONARY ARTERY OF NATIVE HEART WITHOUT ANGINA PECTORIS: ICD-10-CM

## 2018-11-27 DIAGNOSIS — I48.0 PAROXYSMAL ATRIAL FIBRILLATION (HCC): ICD-10-CM

## 2018-11-27 PROCEDURE — 99212 OFFICE O/P EST SF 10 MIN: CPT | Performed by: INTERNAL MEDICINE

## 2018-11-27 RX ORDER — POTASSIUM CHLORIDE 1.5 G/1.77G
POWDER, FOR SOLUTION ORAL
COMMUNITY

## 2018-11-27 RX ORDER — LISINOPRIL 10 MG/1
TABLET ORAL
COMMUNITY
End: 2019-01-24 | Stop reason: ALTCHOICE

## 2018-11-27 NOTE — PROGRESS NOTES
[facility] Patient Office Visit    Jadyn Radford  [address]  Phone: [phone]  Fax: [phone]        11/27/2018    Chief Complaint / Reason for the Visit   Follow up of:  Atrial Fibrillation and CAD and HTN      Specialty Problems        Cardiology Problems    CAD (coronary artery disease)        Paroxysmal atrial fibrillation (HCC)        Syncope and collapse        Typical atrial flutter (HCC)        Chronic combined systolic and diastolic heart failure (HCC)        Sick sinus syndrome ([address]        Amaurosis fugax        Essential hypertension              Current Status Today According to the patient:  \"I woke up this morning so I'm doing pretty good\"    Subjective:  Mr. Theophilus Peabody is generally feeling stable. Mr. Theophilus Peabody has the following cardiac complaints / symptoms today:    1. Atrial Fibrillation, in NSR    2. CAD, Is stable on current medications     3.  HTN, Is stable on current medications         Theophilus Peabody is a 76 y.o. male with the following history as [facility] in [facility]:    Patient Active Problem List    Diagnosis Date Noted    Abnormal stress test 07/10/2018    Essential hypertension 04/26/2018    Chronic anticoagulation 04/26/2018    Amaurosis fugax 10/18/2017    Chronic combined systolic and diastolic heart failure (HCC)     Sick sinus syndrome (HCC)     Syncope and collapse     Typical atrial flutter (HCC)     Paroxysmal atrial fibrillation ([address]     Chest pain 06/22/2016    Shortness of breath 07/01/2014    Ex-smoker     S/P ablation of atrial fibrillation 11/11/2013    History of amiodarone therapy     Sloughing of skin 08/20/2013    Hyperlipidemia     CAD (coronary artery disease)      Current Outpatient Prescriptions   Medication Sig Dispense Refill    lisinopril (PRINIVIL;ZESTRIL) 10 MG tablet Take 1/2 tablet daily      potassium chloride (KLOR-CON) 20 MEQ packet Take 1/2 Disease Father     High Blood Pressure Father     High Cholesterol Father     Other Father     Heart Disease Other      Social History   Substance Use Topics    Smoking status: Former Smoker     Packs/day: 0.00     Types: Cigarettes    Smokeless tobacco: Never Used    Alcohol use No          Review of Systems:    General:      Complaint / Symptom Yes / No / Description if Yes       Fatigue No   Weight gain N/A   Insomnia N/A       Respiratory:        Complaint / Symptom Yes / No / Description if Yes       Cough No   Horseness N/A       Cardiovascular:    Complaint / Symptom Yes / No / Description if Yes       Chest Pain No   Shortness of Air / Orthopnea No   Presyncope / Syncope No   Palpitations No         Objective:    BP (!) 102/58   Pulse 80   Ht 6' (1.829 m)   Wt 225 lb (102.1 kg)   BMI 30.52 kg/m²     GENERAL - well developed and well nourished, conversant  HEENT -  PERRLA, Hearing appears normal  NECK - no thyromegaly, no JVD, trachea is in the midline  CARDIOVASCULAR - PMI is in the mid line clavicular position, Normal S1 and S2 with a grade 1/6 systolic murmur. No S3 or S4    PULMONARY - no respiratory distress. No wheezes or rales.  Lungs are clear to ausculation   ABDOMEN  - soft, non tender, no rebound  MUSCULOSKELETAL  - range of motion of the upper and lower extermites appears normal and equal and is without pain   EXTREMITIES - no significant edema   NEUROLOGIC - gait and station are normal  SKIN - turgor is normal  PSYCHIATRIC - normal mood and affect, alert and orientated x 3,      ASSESSMENT:    ALL THE CARDIOLOGY PROBLEMS ARE LISTED ABOVE; HOWEVER, THE FOLLOWING SPECIFIC CARDIAC PROBLEMS / CONDITIONS WERE ADDRESSED AND TREATED DURING THE OFFICE VISIT TODAY:                                                                                            MEDICAL DECISION MAKING             Cardiac Specific Problem / Diagnosis  Discussion and Data Reviewed Diagnostic Procedures Ordered Management Options Selected           1. Atrial Fibrillation  show no change   Review and summation of old records: In NSR today, doing well, feels great, doing everything he wants to do. No Continue current medications:     Yes           2. CAD  show no change   06/27/2013 CABG X4 LIMA-diag, VG-LAD, VG-OM, VG-PDA Arvenzo Neil)  11/10/2015  SE negative for myocardial ischemia  6/24/2016  TTE  Normal LVFX  7/2/2018 lexiscan Positive for inferior MI + myocardial ischemia, EF 50%, 12% ischemic myocardium on stress, intermediate risk findings, AUC indication 17, AUC score 7  7/11/18  Cath  Severe NVD, patent LIMA-LAD, patent yet extremely small VG-LAD, patent VG-RCA, closed VG-OM, anterior lateral akinesis, EF 35% No Continue current medications:    {Yes           3. HTN  show no change   While the primary care provider manages this disease state, I have personally reviewed these laboratory values today as they influence the cardiovascular condition that I currently manage I personally addressed, counselled and educated the patient on this problem / risk factor. I will personally continue and manage prescribed medications and monitor the course of the therapy. No Continue current medications:       Yes         Discussed with patient. Follow Up Visit Scheduled for:  6 month(s)    I greatly appreciate the opportunity to meet Jeannette Radford and your confidence in allowing me to participate in his cardiovascular care. [facility] [address] [place] [address] am scribing for and in the presence of GLADIS Rodrigues MD,FACC. Edis Jimenez MA    11/27/18 11:07 AM  IGLADIS MD, [facility], personally performed the services described in this documentation as scribed by Edis Jimenez in my presence, and it is both accurate and complete. Electronically signed by Rosalind Madera.  Monica Rodrigues MD, [facility]    11/27/18 11:09 AM

## 2018-12-26 ENCOUNTER — APPOINTMENT (OUTPATIENT)
Dept: GENERAL RADIOLOGY | Age: 68
End: 2018-12-26
Payer: MEDICARE

## 2018-12-26 ENCOUNTER — HOSPITAL ENCOUNTER (OUTPATIENT)
Age: 68
Setting detail: OBSERVATION
Discharge: HOME | End: 2018-12-27
Attending: EMERGENCY MEDICINE | Admitting: INTERNAL MEDICINE
Payer: MEDICARE

## 2018-12-26 DIAGNOSIS — Z95.1 HX OF CABG: ICD-10-CM

## 2018-12-26 DIAGNOSIS — I20.0 UNSTABLE ANGINA (HCC): Primary | ICD-10-CM

## 2018-12-26 PROBLEM — I24.9 ACS (ACUTE CORONARY SYNDROME) (HCC): Status: ACTIVE | Noted: 2018-12-26

## 2018-12-26 LAB
ALBUMIN SERPL-MCNC: 3.9 G/DL (ref 3.5–5.2)
ALP BLD-CCNC: 47 U/L (ref 40–130)
ALT SERPL-CCNC: 13 U/L (ref 5–41)
ANION GAP SERPL CALCULATED.3IONS-SCNC: 14 MMOL/L (ref 7–19)
AST SERPL-CCNC: 29 U/L (ref 5–40)
BASOPHILS ABSOLUTE: 0.1 K/UL (ref 0–0.2)
BASOPHILS RELATIVE PERCENT: 0.7 % (ref 0–1)
BILIRUB SERPL-MCNC: 0.4 MG/DL (ref 0.2–1.2)
BUN BLDV-MCNC: 13 MG/DL (ref 8–23)
CALCIUM SERPL-MCNC: 9.1 MG/DL (ref 8.8–10.2)
CHLORIDE BLD-SCNC: 98 MMOL/L (ref 98–111)
CO2: 24 MMOL/L (ref 22–29)
CREAT SERPL-MCNC: 0.9 MG/DL (ref 0.5–1.2)
EOSINOPHILS ABSOLUTE: 0.2 K/UL (ref 0–0.6)
EOSINOPHILS RELATIVE PERCENT: 2.5 % (ref 0–5)
GFR NON-AFRICAN AMERICAN: >60
GLUCOSE BLD-MCNC: 166 MG/DL (ref 74–109)
HCT VFR BLD CALC: 39.9 % (ref 42–52)
HEMOGLOBIN: 13.2 G/DL (ref 14–18)
INR BLD: 1.29 (ref 0.88–1.18)
LIPASE: 39 U/L (ref 13–60)
LYMPHOCYTES ABSOLUTE: 3.1 K/UL (ref 1.1–4.5)
LYMPHOCYTES RELATIVE PERCENT: 35.5 % (ref 20–40)
MCH RBC QN AUTO: 30.6 PG (ref 27–31)
MCHC RBC AUTO-ENTMCNC: 33.1 G/DL (ref 33–37)
MCV RBC AUTO: 92.6 FL (ref 80–94)
MONOCYTES ABSOLUTE: 0.7 K/UL (ref 0–0.9)
MONOCYTES RELATIVE PERCENT: 7.5 % (ref 0–10)
NEUTROPHILS ABSOLUTE: 4.7 K/UL (ref 1.5–7.5)
NEUTROPHILS RELATIVE PERCENT: 53.3 % (ref 50–65)
PDW BLD-RTO: 12.5 % (ref 11.5–14.5)
PERFORMED ON: NORMAL
PLATELET # BLD: 262 K/UL (ref 130–400)
PMV BLD AUTO: 10.4 FL (ref 9.4–12.4)
POC TROPONIN I: 0.01 NG/ML (ref 0–0.08)
POTASSIUM SERPL-SCNC: 4.8 MMOL/L (ref 3.5–5)
PRO-BNP: 303 PG/ML (ref 0–900)
PROTHROMBIN TIME: 15.4 SEC (ref 12–14.6)
RBC # BLD: 4.31 M/UL (ref 4.7–6.1)
SODIUM BLD-SCNC: 136 MMOL/L (ref 136–145)
TOTAL PROTEIN: 7.2 G/DL (ref 6.6–8.7)
TROPONIN: <0.01 NG/ML (ref 0–0.03)
WBC # BLD: 8.8 K/UL (ref 4.8–10.8)

## 2018-12-26 PROCEDURE — 71046 X-RAY EXAM CHEST 2 VIEWS: CPT

## 2018-12-26 PROCEDURE — 99285 EMERGENCY DEPT VISIT HI MDM: CPT

## 2018-12-26 PROCEDURE — 99220 PR INITIAL OBSERVATION CARE/DAY 70 MINUTES: CPT | Performed by: INTERNAL MEDICINE

## 2018-12-26 PROCEDURE — 85025 COMPLETE CBC W/AUTO DIFF WBC: CPT

## 2018-12-26 PROCEDURE — 83880 ASSAY OF NATRIURETIC PEPTIDE: CPT

## 2018-12-26 PROCEDURE — 85610 PROTHROMBIN TIME: CPT

## 2018-12-26 PROCEDURE — 83690 ASSAY OF LIPASE: CPT

## 2018-12-26 PROCEDURE — 93005 ELECTROCARDIOGRAM TRACING: CPT

## 2018-12-26 PROCEDURE — 2500000003 HC RX 250 WO HCPCS: Performed by: EMERGENCY MEDICINE

## 2018-12-26 PROCEDURE — 84484 ASSAY OF TROPONIN QUANT: CPT

## 2018-12-26 PROCEDURE — 6370000000 HC RX 637 (ALT 250 FOR IP): Performed by: EMERGENCY MEDICINE

## 2018-12-26 PROCEDURE — 80053 COMPREHEN METABOLIC PANEL: CPT

## 2018-12-26 PROCEDURE — 36415 COLL VENOUS BLD VENIPUNCTURE: CPT

## 2018-12-26 PROCEDURE — 99285 EMERGENCY DEPT VISIT HI MDM: CPT | Performed by: EMERGENCY MEDICINE

## 2018-12-26 RX ORDER — RANOLAZINE 500 MG/1
500 TABLET, EXTENDED RELEASE ORAL 2 TIMES DAILY
Status: DISCONTINUED | OUTPATIENT
Start: 2018-12-26 | End: 2018-12-27 | Stop reason: HOSPADM

## 2018-12-26 RX ORDER — ASPIRIN 81 MG/1
162 TABLET, CHEWABLE ORAL ONCE
Status: COMPLETED | OUTPATIENT
Start: 2018-12-26 | End: 2018-12-26

## 2018-12-26 RX ORDER — NITROGLYCERIN 20 MG/100ML
5 INJECTION INTRAVENOUS CONTINUOUS
Status: DISCONTINUED | OUTPATIENT
Start: 2018-12-26 | End: 2018-12-27

## 2018-12-26 RX ORDER — NITROGLYCERIN 0.4 MG/1
0.4 TABLET SUBLINGUAL EVERY 5 MIN PRN
Status: DISCONTINUED | OUTPATIENT
Start: 2018-12-26 | End: 2018-12-27 | Stop reason: HOSPADM

## 2018-12-26 RX ADMIN — NITROGLYCERIN 0.4 MG: 0.4 TABLET, ORALLY DISINTEGRATING SUBLINGUAL at 21:52

## 2018-12-26 RX ADMIN — ASPIRIN 81 MG CHEWABLE TABLET 162 MG: 81 TABLET CHEWABLE at 22:16

## 2018-12-26 RX ADMIN — NITROGLYCERIN 0.4 MG: 0.4 TABLET, ORALLY DISINTEGRATING SUBLINGUAL at 22:14

## 2018-12-26 RX ADMIN — NITROGLYCERIN 5 MCG/MIN: 20 INJECTION INTRAVENOUS at 22:46

## 2018-12-26 ASSESSMENT — ENCOUNTER SYMPTOMS
ABDOMINAL PAIN: 0
COUGH: 0
SHORTNESS OF BREATH: 0

## 2018-12-26 ASSESSMENT — PAIN SCALES - GENERAL: PAINLEVEL_OUTOF10: 5

## 2018-12-27 VITALS
HEIGHT: 71 IN | BODY MASS INDEX: 31.16 KG/M2 | RESPIRATION RATE: 16 BRPM | TEMPERATURE: 97 F | DIASTOLIC BLOOD PRESSURE: 66 MMHG | SYSTOLIC BLOOD PRESSURE: 110 MMHG | OXYGEN SATURATION: 98 % | WEIGHT: 222.6 LBS | HEART RATE: 78 BPM

## 2018-12-27 LAB
CHOLESTEROL, TOTAL: 120 MG/DL (ref 160–199)
GLUCOSE BLD-MCNC: 106 MG/DL (ref 70–99)
HCT VFR BLD CALC: 40.1 % (ref 42–52)
HDLC SERPL-MCNC: 41 MG/DL (ref 55–121)
HEMOGLOBIN: 13.1 G/DL (ref 14–18)
LDL CHOLESTEROL CALCULATED: 55 MG/DL
MCH RBC QN AUTO: 31 PG (ref 27–31)
MCHC RBC AUTO-ENTMCNC: 32.7 G/DL (ref 33–37)
MCV RBC AUTO: 94.8 FL (ref 80–94)
PDW BLD-RTO: 12.5 % (ref 11.5–14.5)
PERFORMED ON: ABNORMAL
PLATELET # BLD: 263 K/UL (ref 130–400)
PMV BLD AUTO: 10.2 FL (ref 9.4–12.4)
RBC # BLD: 4.23 M/UL (ref 4.7–6.1)
TRIGL SERPL-MCNC: 122 MG/DL (ref 0–149)
TROPONIN: <0.01 NG/ML (ref 0–0.03)
WBC # BLD: 8.8 K/UL (ref 4.8–10.8)

## 2018-12-27 PROCEDURE — 99217 PR OBSERVATION CARE DISCHARGE MANAGEMENT: CPT | Performed by: INTERNAL MEDICINE

## 2018-12-27 PROCEDURE — 80061 LIPID PANEL: CPT

## 2018-12-27 PROCEDURE — 85027 COMPLETE CBC AUTOMATED: CPT

## 2018-12-27 PROCEDURE — 93005 ELECTROCARDIOGRAM TRACING: CPT

## 2018-12-27 PROCEDURE — 2580000003 HC RX 258: Performed by: INTERNAL MEDICINE

## 2018-12-27 PROCEDURE — G0378 HOSPITAL OBSERVATION PER HR: HCPCS

## 2018-12-27 PROCEDURE — 36415 COLL VENOUS BLD VENIPUNCTURE: CPT

## 2018-12-27 PROCEDURE — 82948 REAGENT STRIP/BLOOD GLUCOSE: CPT

## 2018-12-27 PROCEDURE — 6370000000 HC RX 637 (ALT 250 FOR IP): Performed by: INTERNAL MEDICINE

## 2018-12-27 PROCEDURE — 84484 ASSAY OF TROPONIN QUANT: CPT

## 2018-12-27 RX ORDER — METOPROLOL SUCCINATE 25 MG/1
25 TABLET, EXTENDED RELEASE ORAL DAILY
Status: DISCONTINUED | OUTPATIENT
Start: 2018-12-27 | End: 2018-12-27 | Stop reason: HOSPADM

## 2018-12-27 RX ORDER — ASPIRIN 81 MG/1
81 TABLET, CHEWABLE ORAL DAILY
Status: DISCONTINUED | OUTPATIENT
Start: 2018-12-27 | End: 2018-12-27 | Stop reason: HOSPADM

## 2018-12-27 RX ORDER — ONDANSETRON 2 MG/ML
4 INJECTION INTRAMUSCULAR; INTRAVENOUS EVERY 6 HOURS PRN
Status: DISCONTINUED | OUTPATIENT
Start: 2018-12-27 | End: 2018-12-27 | Stop reason: HOSPADM

## 2018-12-27 RX ORDER — POTASSIUM CHLORIDE 20 MEQ/1
20 TABLET, EXTENDED RELEASE ORAL EVERY EVENING
Status: DISCONTINUED | OUTPATIENT
Start: 2018-12-27 | End: 2018-12-27 | Stop reason: HOSPADM

## 2018-12-27 RX ORDER — ISOSORBIDE MONONITRATE 60 MG/1
60 TABLET, EXTENDED RELEASE ORAL DAILY
Status: DISCONTINUED | OUTPATIENT
Start: 2018-12-27 | End: 2018-12-27 | Stop reason: HOSPADM

## 2018-12-27 RX ORDER — RANOLAZINE 500 MG/1
500 TABLET, EXTENDED RELEASE ORAL 2 TIMES DAILY
Qty: 60 TABLET | Refills: 5 | Status: ON HOLD | OUTPATIENT
Start: 2018-12-27 | End: 2019-01-25 | Stop reason: HOSPADM

## 2018-12-27 RX ORDER — SODIUM CHLORIDE 0.9 % (FLUSH) 0.9 %
10 SYRINGE (ML) INJECTION PRN
Status: DISCONTINUED | OUTPATIENT
Start: 2018-12-27 | End: 2018-12-27 | Stop reason: HOSPADM

## 2018-12-27 RX ORDER — ATORVASTATIN CALCIUM 40 MG/1
80 TABLET, FILM COATED ORAL NIGHTLY
Status: DISCONTINUED | OUTPATIENT
Start: 2018-12-27 | End: 2018-12-27 | Stop reason: HOSPADM

## 2018-12-27 RX ORDER — SODIUM CHLORIDE 0.9 % (FLUSH) 0.9 %
10 SYRINGE (ML) INJECTION EVERY 12 HOURS SCHEDULED
Status: DISCONTINUED | OUTPATIENT
Start: 2018-12-27 | End: 2018-12-27 | Stop reason: HOSPADM

## 2018-12-27 RX ORDER — FUROSEMIDE 80 MG
80 TABLET ORAL DAILY
Status: DISCONTINUED | OUTPATIENT
Start: 2018-12-27 | End: 2018-12-27 | Stop reason: HOSPADM

## 2018-12-27 RX ORDER — LISINOPRIL 5 MG/1
5 TABLET ORAL DAILY
Status: DISCONTINUED | OUTPATIENT
Start: 2018-12-27 | End: 2018-12-27 | Stop reason: HOSPADM

## 2018-12-27 RX ORDER — NITROGLYCERIN 0.4 MG/1
TABLET SUBLINGUAL
Qty: 25 TABLET | Refills: 5 | Status: SHIPPED | OUTPATIENT
Start: 2018-12-27

## 2018-12-27 RX ADMIN — LISINOPRIL 5 MG: 5 TABLET ORAL at 09:20

## 2018-12-27 RX ADMIN — METOPROLOL SUCCINATE 25 MG: 25 TABLET, EXTENDED RELEASE ORAL at 09:20

## 2018-12-27 RX ADMIN — FUROSEMIDE 80 MG: 80 TABLET ORAL at 09:19

## 2018-12-27 RX ADMIN — RANOLAZINE 500 MG: 500 TABLET, FILM COATED, EXTENDED RELEASE ORAL at 03:47

## 2018-12-27 RX ADMIN — ISOSORBIDE MONONITRATE 60 MG: 60 TABLET, EXTENDED RELEASE ORAL at 09:19

## 2018-12-27 RX ADMIN — RANOLAZINE 500 MG: 500 TABLET, FILM COATED, EXTENDED RELEASE ORAL at 09:18

## 2018-12-27 RX ADMIN — ASPIRIN 81 MG 81 MG: 81 TABLET ORAL at 09:19

## 2018-12-27 RX ADMIN — Medication 10 ML: at 09:19

## 2018-12-27 RX ADMIN — APIXABAN 5 MG: 5 TABLET, FILM COATED ORAL at 09:21

## 2018-12-27 ASSESSMENT — PAIN SCALES - GENERAL: PAINLEVEL_OUTOF10: 0

## 2018-12-27 NOTE — H&P
[facility] [place]      History and Physical           I personally saw the patient and rounded with:  ED RN, on  18      The observations documented in this note, including the assessment and plan are mine              Date of Admission:  2018  9:21 PM    Date of Initially Being Seen / Consultation:  18    Cardiologist:  Tiffany Robertson MD     Cardiology Attending: [facility] AttendinTeofilo Palomares     PCP:  Krissy Kennedy MD    Reason for Consultation or Admission / Chief Complaint:  Chest discomfort    SUBJECTIVE AND HISTORY OF PRESENT ILLNESS:    Source of the history:  Patient, family, previous inpatient and outpatient records in [facility] [address] is a 76 y.o. male who presents to [facility] ED # 8 with symptoms / signs / problem or diagnosis of chest discomfort. The discomfort began about 7 pm tonight and continued to him taking a shower. He then sat down at the kitchen table after he turned off [place] lights. His wife said she could tell he was not feeling well and they came to the Ed. It is a pressure like sensation in the center of his chest.  It went away when he was given IV nitro. There was mild SOA but no pre syncope or syncope. When being examined this evening, he has had no symptoms of exertional chest discomfort, unusual or change in shortness of air, presyncope or syncope.        Family present:  Yes: wife      CARDIAC RISK PROFILE:    Risk Factor Yes / No / Unknown       Gender Male   Cigarette Use No, but did use in the past    Family History of Cardiovascular Disease Yes: stroke, heart disease, high blood pressure, high cholesterol   Diabetes Mellitus yes   Hypercholesteremia yes   Hypertension yes          Cardiac Specific Problems:    Specialty Problems        Cardiology Problems    CAD (coronary artery disease)        Paroxysmal atrial fibrillation (HCC)        Syncope and collapse        Typical atrial flutter (HCC)        Chronic 12/26/2018    ALT 13 12/26/2018    BILIDIR 0.2 06/07/2015    BILITOT 0.4 12/26/2018    ALKPHOS 47 12/26/2018     Lab Results   Component Value Date    CHOL 126 07/11/2018    HDL 35 07/11/2018    TRIG 143 07/11/2018     TSH:  No results found for: TSH  UA:   Lab Results   Component Value Date    COLORU YELLOW 08/28/2016    PHUR 5.5 08/28/2016    CLARITYU Clear 08/28/2016    SPECGRAV 1.016 08/28/2016    LEUKOCYTESUR Negative 08/28/2016    UROBILINOGEN 0.2 08/28/2016    BILIRUBINUR Negative 08/28/2016    BLOODU Negative 08/28/2016    GLUCOSEU Negative 08/28/2016             ALL THE CARDIOLOGY PROBLEMS ARE LISTED ABOVE; HOWEVER, THE FOLLOWING SPECIFIC CARDIAC PROBLEMS WERE ADDRESSED AND  TREATED DURING THE HOSPITAL     MEDICAL DECISION MAKING               Cardiac Specific Problem / Diagnosis  Discussion and Data Reviewed Diagnostic Procedures Ordered Management Options Selected           1. Presenting problem / symptom    Chest discomfort of ? etiology  are worsening   The chest discomfort is was not exertional and does  appear to represent myocardial ischemia. Nonetheless, He has the following risk factors for the presence of coronary artery disease:    Risk Factor Yes / No / Unknown       Gender Male   Cigarette Use No, but did use in the past    Family History of Cardiovascular Disease Yes: stroke, heart disease, high blood pressure, high cholesterol   Diabetes Mellitus yes   Hypercholesteremia yes   Hypertension yes         He also has the following cardiac history:    Specialty Problems        Cardiology Problems    CAD (coronary artery disease)        Paroxysmal atrial fibrillation (HCC)        Syncope and collapse        Typical atrial flutter (HCC)        Chronic combined systolic and diastolic heart failure (HCC)        Sick sinus syndrome (HCC)        Amaurosis fugax        Essential hypertension               No Continue current medications:     Yes: with modification           2.  CAD Initial

## 2018-12-27 NOTE — ED PROVIDER NOTES
11/11/2013 6/27/13    Stroke ([address] 08/22/2017    eye         SURGICAL HISTORY       Past Surgical History:   Procedure Laterality Date    ABLATION OF DYSRHYTHMIC FOCUS      CARDIAC CATHETERIZATION  6/27/13  MDL    EF 45%    CARDIAC SURGERY      CABG x 4    CATARACT REMOVAL WITH IMPLANT Bilateral     CORONARY ARTERY BYPASS GRAFT  6/27/2013     Emergency CABG x 4, LIMA-DIAG, SVG-LAD, SVG-OM, SVG-PDA, RT EVH, DR DOLAN    CYST INCISION AND DRAINAGE N/A     RECTAL    EYE SURGERY      EYE SURGERY      cataract sx and implants (both eyes)    OTHER SURGICAL HISTORY      heart ablation    RETROPHYARYNGEAL ABCESS INCISION/DRAIN      Abcess near rectum         CURRENT MEDICATIONS       Previous Medications    ASPIRIN 81 MG TABLET    Take 81 mg by mouth daily    ATORVASTATIN (LIPITOR) 80 MG TABLET    Take 1 tablet by mouth nightly    CHOLECALCIFEROL (VITAMIN D) 2000 UNITS CAPS CAPSULE    Take 1/2 tablet daily    ELIQUIS 5 MG TABS TABLET    TAKE 1 TABLET BY MOUTH 2 TIMES DAILY    FUROSEMIDE (LASIX) 80 MG TABLET    Take 1 tablet by mouth daily    ISOSORBIDE MONONITRATE (IMDUR) 60 MG EXTENDED RELEASE TABLET    Take 1 tablet by mouth daily    LISINOPRIL (PRINIVIL;ZESTRIL) 10 MG TABLET    Take 1/2 tablet daily    METOPROLOL SUCCINATE (TOPROL XL) 25 MG EXTENDED RELEASE TABLET    Take 1 tablet by mouth daily    POTASSIUM CHLORIDE (KLOR-CON) 20 MEQ PACKET    Take 1/2 tablet daily       ALLERGIES     Amiodarone; Pcn [penicillins];  Penicillins; and Amiodarone    FAMILY HISTORY       Family History   Problem Relation Age of Onset    Stroke Father     Heart Disease Father     High Blood Pressure Father     High Cholesterol Father     Other Father     Heart Disease Other           SOCIAL HISTORY       Social History     Social History    Marital status:      Spouse name: N/A    Number of children: N/A    Years of education: N/A     Social History Main Topics    Smoking status: Former Smoker     Packs/day: time of this note:    XR CHEST STANDARD (2 VW)   Final Result   1. No radiographic evidence of acute cardiopulmonary process. Signed by Dr Alcira Johnson on 12/26/2018 9:59 PM            ED BEDSIDE ULTRASOUND:   Performed by ED Physician - none    LABS:  Labs Reviewed   COMPREHENSIVE METABOLIC PANEL - Abnormal; Notable for the following:        Result Value    Glucose 166 (*)     All other components within normal limits   CBC WITH AUTO DIFFERENTIAL - Abnormal; Notable for the following:     RBC 4.31 (*)     Hemoglobin 13.2 (*)     Hematocrit 39.9 (*)     All other components within normal limits   PROTIME-INR - Abnormal; Notable for the following:     Protime 15.4 (*)     INR 1.29 (*)     All other components within normal limits   LIPASE   TROPONIN   BRAIN NATRIURETIC PEPTIDE   TROPONIN   POCT VENOUS       All other labs were within normal range or not returned as of this dictation. EMERGENCY DEPARTMENT COURSE and DIFFERENTIALDIAGNOSIS/MDM:   Vitals:    Vitals:    12/26/18 2124 12/26/18 2200 12/26/18 2217 12/26/18 2250   BP:  118/68 112/71 113/62   Pulse: 85 80 87 77   Resp: 18 18 17 18   Temp: 98 °F (36.7 °C)      SpO2: 94% 95% 95% 95%   Weight: 218 lb (98.9 kg)      Height: 6' (1.829 m)          MDM  Number of Diagnoses or Management Options  Unstable angina [facility]):   Diagnosis management comments: Patient with history of CABG with unstable angina. Given 2 nitroglycerin tablets sublingual. Patient given aspirin. Patient symptoms greatly alleviated chest pain down to a 1 out of 10 starting nitroglycerin drip. Discussed with cardiologist Dr. Colby Sebastian. Patient will be admitted his service. Patient is stable at this time with negative troponin and no acute EKG changes with chest pressure consistent with unstable angina.        Amount and/or Complexity of Data Reviewed  Clinical lab tests: ordered and reviewed  Tests in the radiology section of CPT®: reviewed and ordered  Decide to obtain previous medical records or to obtain history from someone other than the patient: yes  Obtain history from someone other than the patient: yes  Discuss the patient with other providers: yes  Independent visualization of images, tracings, or specimens: yes    Risk of Complications, Morbidity, and/or Mortality  Presenting problems: high    Patient Progress  Patient progress: improved        CONSULTS:  None    PROCEDURES:  Unless otherwise notedbelow, none     Procedures    FINAL IMPRESSION     1. Unstable angina ([address]    2. Hx of CABG          DISPOSITION/PLAN   DISPOSITION Decision To Admit 12/26/2018 10:35:27 PM      PATIENT REFERRED TO:  No follow-up provider specified.     DISCHARGE MEDICATIONS:  New Prescriptions    No medications on file          (Please note that portions of this note were completed with a voice recognition program.  Efforts were made to edit the dictations butoccasionally words are mis-transcribed.)    Sarita Velarde MD (electronically signed)  AttendingEmergency Physician          Sarita Velarde MD  12/26/18 [id number]

## 2018-12-28 LAB
EKG P AXIS: 58 DEGREES
EKG P AXIS: 9 DEGREES
EKG P AXIS: NORMAL DEGREES
EKG P-R INTERVAL: 210 MS
EKG P-R INTERVAL: 228 MS
EKG P-R INTERVAL: NORMAL MS
EKG Q-T INTERVAL: 382 MS
EKG Q-T INTERVAL: 388 MS
EKG Q-T INTERVAL: 410 MS
EKG QRS DURATION: 100 MS
EKG QRS DURATION: 102 MS
EKG QRS DURATION: 102 MS
EKG QTC CALCULATION (BAZETT): 403 MS
EKG QTC CALCULATION (BAZETT): 415 MS
EKG QTC CALCULATION (BAZETT): 424 MS
EKG T AXIS: 35 DEGREES
EKG T AXIS: 45 DEGREES
EKG T AXIS: 50 DEGREES

## 2019-01-24 ENCOUNTER — APPOINTMENT (OUTPATIENT)
Dept: GENERAL RADIOLOGY | Age: 69
End: 2019-01-24
Payer: MEDICARE

## 2019-01-24 ENCOUNTER — HOSPITAL ENCOUNTER (OUTPATIENT)
Age: 69
Setting detail: OBSERVATION
Discharge: HOME | End: 2019-01-25
Attending: EMERGENCY MEDICINE | Admitting: INTERNAL MEDICINE
Payer: MEDICARE

## 2019-01-24 DIAGNOSIS — I20.0 UNSTABLE ANGINA (HCC): Primary | ICD-10-CM

## 2019-01-24 PROBLEM — R07.89 CHEST DISCOMFORT: Status: ACTIVE | Noted: 2019-01-24

## 2019-01-24 LAB
ALBUMIN SERPL-MCNC: 4.8 G/DL (ref 3.5–5.2)
ALP BLD-CCNC: 66 U/L (ref 40–130)
ALT SERPL-CCNC: 12 U/L (ref 5–41)
ANION GAP SERPL CALCULATED.3IONS-SCNC: 9 MMOL/L (ref 7–19)
APTT: 27.5 SEC (ref 26–36.2)
AST SERPL-CCNC: 15 U/L (ref 5–40)
BASOPHILS ABSOLUTE: 0 K/UL (ref 0–0.2)
BASOPHILS RELATIVE PERCENT: 0.4 % (ref 0–1)
BILIRUB SERPL-MCNC: 0.5 MG/DL (ref 0.2–1.2)
BUN BLDV-MCNC: 14 MG/DL (ref 8–23)
CALCIUM SERPL-MCNC: 10 MG/DL (ref 8.8–10.2)
CHLORIDE BLD-SCNC: 98 MMOL/L (ref 98–111)
CO2: 34 MMOL/L (ref 22–29)
CREAT SERPL-MCNC: 0.8 MG/DL (ref 0.5–1.2)
EOSINOPHILS ABSOLUTE: 0 K/UL (ref 0–0.6)
EOSINOPHILS RELATIVE PERCENT: 0 % (ref 0–5)
GFR NON-AFRICAN AMERICAN: >60
GLUCOSE BLD-MCNC: 117 MG/DL (ref 74–109)
HCT VFR BLD CALC: 44.7 % (ref 42–52)
HEMOGLOBIN: 14.8 G/DL (ref 14–18)
INR BLD: 1.11 (ref 0.88–1.18)
LYMPHOCYTES ABSOLUTE: 3 K/UL (ref 1.1–4.5)
LYMPHOCYTES RELATIVE PERCENT: 37 % (ref 20–40)
MCH RBC QN AUTO: 31 PG (ref 27–31)
MCHC RBC AUTO-ENTMCNC: 33.1 G/DL (ref 33–37)
MCV RBC AUTO: 93.7 FL (ref 80–94)
MONOCYTES ABSOLUTE: 0.9 K/UL (ref 0–0.9)
MONOCYTES RELATIVE PERCENT: 11.1 % (ref 0–10)
NEUTROPHILS ABSOLUTE: 4.1 K/UL (ref 1.5–7.5)
NEUTROPHILS RELATIVE PERCENT: 51.3 % (ref 50–65)
PDW BLD-RTO: 12.5 % (ref 11.5–14.5)
PLATELET # BLD: 279 K/UL (ref 130–400)
PMV BLD AUTO: 9.8 FL (ref 9.4–12.4)
POTASSIUM SERPL-SCNC: 4.1 MMOL/L (ref 3.5–5)
PRO-BNP: 317 PG/ML (ref 0–900)
PROTHROMBIN TIME: 13.7 SEC (ref 12–14.6)
RBC # BLD: 4.77 M/UL (ref 4.7–6.1)
SODIUM BLD-SCNC: 141 MMOL/L (ref 136–145)
TOTAL PROTEIN: 8 G/DL (ref 6.6–8.7)
TROPONIN: <0.01 NG/ML (ref 0–0.03)
WBC # BLD: 8.1 K/UL (ref 4.8–10.8)

## 2019-01-24 PROCEDURE — 36415 COLL VENOUS BLD VENIPUNCTURE: CPT

## 2019-01-24 PROCEDURE — 71046 X-RAY EXAM CHEST 2 VIEWS: CPT

## 2019-01-24 PROCEDURE — 84484 ASSAY OF TROPONIN QUANT: CPT

## 2019-01-24 PROCEDURE — 85610 PROTHROMBIN TIME: CPT

## 2019-01-24 PROCEDURE — 80053 COMPREHEN METABOLIC PANEL: CPT

## 2019-01-24 PROCEDURE — G0378 HOSPITAL OBSERVATION PER HR: HCPCS

## 2019-01-24 PROCEDURE — 85025 COMPLETE CBC W/AUTO DIFF WBC: CPT

## 2019-01-24 PROCEDURE — 99220 PR INITIAL OBSERVATION CARE/DAY 70 MINUTES: CPT | Performed by: INTERNAL MEDICINE

## 2019-01-24 PROCEDURE — 96372 THER/PROPH/DIAG INJ SC/IM: CPT

## 2019-01-24 PROCEDURE — 83880 ASSAY OF NATRIURETIC PEPTIDE: CPT

## 2019-01-24 PROCEDURE — 99285 EMERGENCY DEPT VISIT HI MDM: CPT | Performed by: PHYSICIAN ASSISTANT

## 2019-01-24 PROCEDURE — 6360000002 HC RX W HCPCS: Performed by: INTERNAL MEDICINE

## 2019-01-24 PROCEDURE — 6370000000 HC RX 637 (ALT 250 FOR IP): Performed by: INTERNAL MEDICINE

## 2019-01-24 PROCEDURE — 2580000003 HC RX 258: Performed by: INTERNAL MEDICINE

## 2019-01-24 PROCEDURE — 93005 ELECTROCARDIOGRAM TRACING: CPT

## 2019-01-24 PROCEDURE — 85730 THROMBOPLASTIN TIME PARTIAL: CPT

## 2019-01-24 PROCEDURE — 99285 EMERGENCY DEPT VISIT HI MDM: CPT

## 2019-01-24 RX ORDER — METOPROLOL SUCCINATE 25 MG/1
25 TABLET, EXTENDED RELEASE ORAL DAILY
Status: DISCONTINUED | OUTPATIENT
Start: 2019-01-25 | End: 2019-01-24

## 2019-01-24 RX ORDER — ISOSORBIDE MONONITRATE 60 MG/1
60 TABLET, EXTENDED RELEASE ORAL DAILY
Status: DISCONTINUED | OUTPATIENT
Start: 2019-01-25 | End: 2019-01-25

## 2019-01-24 RX ORDER — FUROSEMIDE 80 MG
80 TABLET ORAL DAILY
Status: DISCONTINUED | OUTPATIENT
Start: 2019-01-25 | End: 2019-01-25 | Stop reason: HOSPADM

## 2019-01-24 RX ORDER — ASPIRIN 81 MG/1
81 TABLET, CHEWABLE ORAL DAILY
Status: DISCONTINUED | OUTPATIENT
Start: 2019-01-24 | End: 2019-01-24 | Stop reason: SDUPTHER

## 2019-01-24 RX ORDER — SODIUM CHLORIDE 0.9 % (FLUSH) 0.9 %
10 SYRINGE (ML) INJECTION EVERY 12 HOURS SCHEDULED
Status: DISCONTINUED | OUTPATIENT
Start: 2019-01-24 | End: 2019-01-24 | Stop reason: SDUPTHER

## 2019-01-24 RX ORDER — SODIUM CHLORIDE 0.9 % (FLUSH) 0.9 %
10 SYRINGE (ML) INJECTION PRN
Status: DISCONTINUED | OUTPATIENT
Start: 2019-01-24 | End: 2019-01-24 | Stop reason: SDUPTHER

## 2019-01-24 RX ORDER — ASPIRIN 81 MG/1
81 TABLET, CHEWABLE ORAL DAILY
Status: DISCONTINUED | OUTPATIENT
Start: 2019-01-25 | End: 2019-01-25 | Stop reason: HOSPADM

## 2019-01-24 RX ORDER — METOPROLOL SUCCINATE 25 MG/1
25 TABLET, EXTENDED RELEASE ORAL 2 TIMES DAILY
Status: DISCONTINUED | OUTPATIENT
Start: 2019-01-25 | End: 2019-01-25 | Stop reason: HOSPADM

## 2019-01-24 RX ORDER — SODIUM CHLORIDE 0.9 % (FLUSH) 0.9 %
10 SYRINGE (ML) INJECTION PRN
Status: DISCONTINUED | OUTPATIENT
Start: 2019-01-24 | End: 2019-01-25 | Stop reason: HOSPADM

## 2019-01-24 RX ORDER — ONDANSETRON 2 MG/ML
4 INJECTION INTRAMUSCULAR; INTRAVENOUS EVERY 6 HOURS PRN
Status: DISCONTINUED | OUTPATIENT
Start: 2019-01-24 | End: 2019-01-25 | Stop reason: HOSPADM

## 2019-01-24 RX ORDER — ATORVASTATIN CALCIUM 80 MG/1
80 TABLET, FILM COATED ORAL NIGHTLY
Status: DISCONTINUED | OUTPATIENT
Start: 2019-01-24 | End: 2019-01-25 | Stop reason: HOSPADM

## 2019-01-24 RX ORDER — ONDANSETRON 2 MG/ML
4 INJECTION INTRAMUSCULAR; INTRAVENOUS EVERY 6 HOURS PRN
Status: DISCONTINUED | OUTPATIENT
Start: 2019-01-24 | End: 2019-01-24 | Stop reason: SDUPTHER

## 2019-01-24 RX ORDER — RANOLAZINE 500 MG/1
500 TABLET, EXTENDED RELEASE ORAL 2 TIMES DAILY
Status: DISCONTINUED | OUTPATIENT
Start: 2019-01-24 | End: 2019-01-25

## 2019-01-24 RX ORDER — SODIUM CHLORIDE 0.9 % (FLUSH) 0.9 %
10 SYRINGE (ML) INJECTION EVERY 12 HOURS SCHEDULED
Status: DISCONTINUED | OUTPATIENT
Start: 2019-01-24 | End: 2019-01-25 | Stop reason: HOSPADM

## 2019-01-24 RX ORDER — ASPIRIN 81 MG/1
81 TABLET, CHEWABLE ORAL DAILY
Status: DISCONTINUED | OUTPATIENT
Start: 2019-01-25 | End: 2019-01-24 | Stop reason: SDUPTHER

## 2019-01-24 RX ORDER — NITROGLYCERIN 0.4 MG/1
0.4 TABLET SUBLINGUAL EVERY 5 MIN PRN
Status: DISCONTINUED | OUTPATIENT
Start: 2019-01-24 | End: 2019-01-25 | Stop reason: HOSPADM

## 2019-01-24 RX ADMIN — ATORVASTATIN CALCIUM 80 MG: 80 TABLET, FILM COATED ORAL at 23:19

## 2019-01-24 RX ADMIN — ENOXAPARIN SODIUM 40 MG: 40 INJECTION SUBCUTANEOUS at 21:17

## 2019-01-24 RX ADMIN — Medication 10 ML: at 21:17

## 2019-01-24 RX ADMIN — APIXABAN 5 MG: 5 TABLET, FILM COATED ORAL at 23:19

## 2019-01-24 RX ADMIN — RANOLAZINE 500 MG: 500 TABLET, FILM COATED, EXTENDED RELEASE ORAL at 23:18

## 2019-01-24 ASSESSMENT — PAIN DESCRIPTION - LOCATION: LOCATION: CHEST

## 2019-01-24 ASSESSMENT — PAIN DESCRIPTION - DESCRIPTORS: DESCRIPTORS: ACHING

## 2019-01-24 ASSESSMENT — PAIN DESCRIPTION - ORIENTATION: ORIENTATION: MID

## 2019-01-24 ASSESSMENT — PAIN SCALES - GENERAL: PAINLEVEL_OUTOF10: 4

## 2019-01-24 ASSESSMENT — PAIN DESCRIPTION - PAIN TYPE: TYPE: ACUTE PAIN

## 2019-01-25 VITALS
TEMPERATURE: 98.5 F | HEART RATE: 75 BPM | RESPIRATION RATE: 18 BRPM | BODY MASS INDEX: 29.61 KG/M2 | OXYGEN SATURATION: 97 % | HEIGHT: 72 IN | SYSTOLIC BLOOD PRESSURE: 108 MMHG | DIASTOLIC BLOOD PRESSURE: 65 MMHG | WEIGHT: 218.6 LBS

## 2019-01-25 LAB
CHOLESTEROL, TOTAL: 122 MG/DL (ref 160–199)
EKG P AXIS: 33 DEGREES
EKG P AXIS: 48 DEGREES
EKG P-R INTERVAL: 194 MS
EKG P-R INTERVAL: 218 MS
EKG Q-T INTERVAL: 392 MS
EKG Q-T INTERVAL: 432 MS
EKG QRS DURATION: 100 MS
EKG QRS DURATION: 94 MS
EKG QTC CALCULATION (BAZETT): 416 MS
EKG QTC CALCULATION (BAZETT): 439 MS
EKG T AXIS: 35 DEGREES
EKG T AXIS: 54 DEGREES
HCT VFR BLD CALC: 40.6 % (ref 42–52)
HDLC SERPL-MCNC: 41 MG/DL (ref 55–121)
HEMOGLOBIN: 13.6 G/DL (ref 14–18)
LDL CHOLESTEROL CALCULATED: 57 MG/DL
MCH RBC QN AUTO: 31.1 PG (ref 27–31)
MCHC RBC AUTO-ENTMCNC: 33.5 G/DL (ref 33–37)
MCV RBC AUTO: 92.9 FL (ref 80–94)
PDW BLD-RTO: 12.4 % (ref 11.5–14.5)
PLATELET # BLD: 243 K/UL (ref 130–400)
PMV BLD AUTO: 10.2 FL (ref 9.4–12.4)
RBC # BLD: 4.37 M/UL (ref 4.7–6.1)
TRIGL SERPL-MCNC: 121 MG/DL (ref 0–149)
TROPONIN: <0.01 NG/ML (ref 0–0.03)
TROPONIN: <0.01 NG/ML (ref 0–0.03)
WBC # BLD: 7.1 K/UL (ref 4.8–10.8)

## 2019-01-25 PROCEDURE — 2580000003 HC RX 258: Performed by: INTERNAL MEDICINE

## 2019-01-25 PROCEDURE — G0378 HOSPITAL OBSERVATION PER HR: HCPCS

## 2019-01-25 PROCEDURE — 85027 COMPLETE CBC AUTOMATED: CPT

## 2019-01-25 PROCEDURE — 80061 LIPID PANEL: CPT

## 2019-01-25 PROCEDURE — 36415 COLL VENOUS BLD VENIPUNCTURE: CPT

## 2019-01-25 PROCEDURE — 6370000000 HC RX 637 (ALT 250 FOR IP): Performed by: INTERNAL MEDICINE

## 2019-01-25 PROCEDURE — 84484 ASSAY OF TROPONIN QUANT: CPT

## 2019-01-25 PROCEDURE — 99217 PR OBSERVATION CARE DISCHARGE MANAGEMENT: CPT | Performed by: INTERNAL MEDICINE

## 2019-01-25 RX ORDER — RANOLAZINE 500 MG/1
1000 TABLET, EXTENDED RELEASE ORAL 2 TIMES DAILY
Status: DISCONTINUED | OUTPATIENT
Start: 2019-01-25 | End: 2019-01-25 | Stop reason: HOSPADM

## 2019-01-25 RX ORDER — ISOSORBIDE MONONITRATE 60 MG/1
60 TABLET, EXTENDED RELEASE ORAL 2 TIMES DAILY
Qty: 30 TABLET | Refills: 3 | Status: SHIPPED | OUTPATIENT
Start: 2019-01-25 | End: 2019-01-28 | Stop reason: SDUPTHER

## 2019-01-25 RX ORDER — ISOSORBIDE MONONITRATE 60 MG/1
60 TABLET, EXTENDED RELEASE ORAL 2 TIMES DAILY
Status: DISCONTINUED | OUTPATIENT
Start: 2019-01-25 | End: 2019-01-25 | Stop reason: HOSPADM

## 2019-01-25 RX ORDER — RANOLAZINE 1000 MG/1
1000 TABLET, EXTENDED RELEASE ORAL 2 TIMES DAILY
Qty: 60 TABLET | Refills: 3 | Status: SHIPPED | OUTPATIENT
Start: 2019-01-25 | End: 2019-01-28 | Stop reason: SDUPTHER

## 2019-01-25 RX ADMIN — FUROSEMIDE 80 MG: 80 TABLET ORAL at 07:46

## 2019-01-25 RX ADMIN — METOPROLOL SUCCINATE 25 MG: 25 TABLET, EXTENDED RELEASE ORAL at 00:04

## 2019-01-25 RX ADMIN — Medication 10 ML: at 07:47

## 2019-01-25 RX ADMIN — METOPROLOL SUCCINATE 25 MG: 25 TABLET, EXTENDED RELEASE ORAL at 07:46

## 2019-01-25 RX ADMIN — APIXABAN 5 MG: 5 TABLET, FILM COATED ORAL at 07:46

## 2019-01-25 RX ADMIN — RANOLAZINE 500 MG: 500 TABLET, FILM COATED, EXTENDED RELEASE ORAL at 07:47

## 2019-01-25 RX ADMIN — ISOSORBIDE MONONITRATE 60 MG: 60 TABLET, EXTENDED RELEASE ORAL at 07:47

## 2019-01-25 RX ADMIN — ASPIRIN 81 MG CHEWABLE TABLET 81 MG: 81 TABLET CHEWABLE at 07:46

## 2019-01-25 ASSESSMENT — PAIN SCALES - GENERAL
PAINLEVEL_OUTOF10: 0

## 2019-01-28 RX ORDER — RANOLAZINE 1000 MG/1
1000 TABLET, EXTENDED RELEASE ORAL 2 TIMES DAILY
Qty: 60 TABLET | Refills: 5 | Status: SHIPPED | OUTPATIENT
Start: 2019-01-28 | End: 2019-06-10 | Stop reason: SDUPTHER

## 2019-01-28 RX ORDER — ISOSORBIDE MONONITRATE 60 MG/1
60 TABLET, EXTENDED RELEASE ORAL 2 TIMES DAILY
Qty: 60 TABLET | Refills: 5 | Status: SHIPPED | OUTPATIENT
Start: 2019-01-28 | End: 2020-03-10 | Stop reason: SDUPTHER

## 2019-01-28 ASSESSMENT — ENCOUNTER SYMPTOMS
CHEST TIGHTNESS: 1
EYE ITCHING: 0
COUGH: 0
PHOTOPHOBIA: 0
NAUSEA: 0
BACK PAIN: 0
COLOR CHANGE: 0
CONSTIPATION: 0
APNEA: 0
SHORTNESS OF BREATH: 1
EYE DISCHARGE: 0
ABDOMINAL PAIN: 0
VOMITING: 0
DIARRHEA: 0

## 2019-01-29 LAB
EKG P AXIS: 50 DEGREES
EKG P-R INTERVAL: 150 MS
EKG Q-T INTERVAL: 356 MS
EKG QRS DURATION: 160 MS
EKG QTC CALCULATION (BAZETT): 461 MS
EKG T AXIS: -94 DEGREES

## 2019-02-06 ENCOUNTER — OFFICE VISIT (OUTPATIENT)
Dept: CARDIOLOGY | Age: 69
End: 2019-02-06
Payer: MEDICARE

## 2019-02-06 VITALS
HEIGHT: 72 IN | DIASTOLIC BLOOD PRESSURE: 70 MMHG | BODY MASS INDEX: 30.61 KG/M2 | SYSTOLIC BLOOD PRESSURE: 114 MMHG | HEART RATE: 72 BPM | WEIGHT: 226 LBS

## 2019-02-06 DIAGNOSIS — I10 ESSENTIAL HYPERTENSION: ICD-10-CM

## 2019-02-06 DIAGNOSIS — E78.2 MIXED HYPERLIPIDEMIA: ICD-10-CM

## 2019-02-06 DIAGNOSIS — I48.0 PAROXYSMAL ATRIAL FIBRILLATION (HCC): ICD-10-CM

## 2019-02-06 DIAGNOSIS — Z86.79 S/P ABLATION OF ATRIAL FIBRILLATION: ICD-10-CM

## 2019-02-06 DIAGNOSIS — Z98.890 S/P ABLATION OF ATRIAL FIBRILLATION: ICD-10-CM

## 2019-02-06 DIAGNOSIS — Z79.01 CHRONIC ANTICOAGULATION: ICD-10-CM

## 2019-02-06 DIAGNOSIS — I25.10 CORONARY ARTERY DISEASE INVOLVING NATIVE CORONARY ARTERY OF NATIVE HEART WITHOUT ANGINA PECTORIS: Primary | ICD-10-CM

## 2019-02-06 PROCEDURE — 99213 OFFICE O/P EST LOW 20 MIN: CPT | Performed by: NURSE PRACTITIONER

## 2019-02-06 RX ORDER — METOPROLOL SUCCINATE 25 MG/1
25 TABLET, EXTENDED RELEASE ORAL DAILY
COMMUNITY
End: 2019-07-30 | Stop reason: ALTCHOICE

## 2019-04-17 ENCOUNTER — HOSPITAL ENCOUNTER (EMERGENCY)
Age: 69
Discharge: HOME | End: 2019-04-17
Payer: MEDICARE

## 2019-04-17 ENCOUNTER — APPOINTMENT (OUTPATIENT)
Dept: GENERAL RADIOLOGY | Age: 69
End: 2019-04-17
Payer: MEDICARE

## 2019-04-17 VITALS
HEART RATE: 66 BPM | BODY MASS INDEX: 30.61 KG/M2 | SYSTOLIC BLOOD PRESSURE: 109 MMHG | WEIGHT: 226 LBS | RESPIRATION RATE: 12 BRPM | HEIGHT: 72 IN | OXYGEN SATURATION: 97 % | TEMPERATURE: 98.5 F | DIASTOLIC BLOOD PRESSURE: 76 MMHG

## 2019-04-17 DIAGNOSIS — R07.9 CHEST PAIN, UNSPECIFIED TYPE: Primary | ICD-10-CM

## 2019-04-17 LAB
ALBUMIN SERPL-MCNC: 4.7 G/DL (ref 3.5–5.2)
ALP BLD-CCNC: 69 U/L (ref 40–130)
ALT SERPL-CCNC: 10 U/L (ref 5–41)
ANION GAP SERPL CALCULATED.3IONS-SCNC: 14 MMOL/L (ref 7–19)
AST SERPL-CCNC: 13 U/L (ref 5–40)
BASOPHILS ABSOLUTE: 0.1 K/UL (ref 0–0.2)
BASOPHILS RELATIVE PERCENT: 0.7 % (ref 0–1)
BILIRUB SERPL-MCNC: 0.8 MG/DL (ref 0.2–1.2)
BUN BLDV-MCNC: 17 MG/DL (ref 8–23)
CALCIUM SERPL-MCNC: 10.3 MG/DL (ref 8.8–10.2)
CHLORIDE BLD-SCNC: 97 MMOL/L (ref 98–111)
CO2: 28 MMOL/L (ref 22–29)
CREAT SERPL-MCNC: 1 MG/DL (ref 0.5–1.2)
EOSINOPHILS ABSOLUTE: 0.1 K/UL (ref 0–0.6)
EOSINOPHILS RELATIVE PERCENT: 1.4 % (ref 0–5)
GFR NON-AFRICAN AMERICAN: >60
GLUCOSE BLD-MCNC: 178 MG/DL (ref 74–109)
HCT VFR BLD CALC: 44.6 % (ref 42–52)
HEMOGLOBIN: 14.9 G/DL (ref 14–18)
LYMPHOCYTES ABSOLUTE: 2.2 K/UL (ref 1.1–4.5)
LYMPHOCYTES RELATIVE PERCENT: 30.8 % (ref 20–40)
MCH RBC QN AUTO: 32.6 PG (ref 27–31)
MCHC RBC AUTO-ENTMCNC: 33.4 G/DL (ref 33–37)
MCV RBC AUTO: 97.6 FL (ref 80–94)
MONOCYTES ABSOLUTE: 0.5 K/UL (ref 0–0.9)
MONOCYTES RELATIVE PERCENT: 6.6 % (ref 0–10)
NEUTROPHILS ABSOLUTE: 4.2 K/UL (ref 1.5–7.5)
NEUTROPHILS RELATIVE PERCENT: 59.6 % (ref 50–65)
PDW BLD-RTO: 12.4 % (ref 11.5–14.5)
PLATELET # BLD: 284 K/UL (ref 130–400)
PMV BLD AUTO: 10.3 FL (ref 9.4–12.4)
POTASSIUM SERPL-SCNC: 3.7 MMOL/L (ref 3.5–5)
PRO-BNP: 294 PG/ML (ref 0–900)
RBC # BLD: 4.57 M/UL (ref 4.7–6.1)
SODIUM BLD-SCNC: 139 MMOL/L (ref 136–145)
TOTAL PROTEIN: 8.2 G/DL (ref 6.6–8.7)
TROPONIN: <0.01 NG/ML (ref 0–0.03)
TROPONIN: <0.01 NG/ML (ref 0–0.03)
WBC # BLD: 7 K/UL (ref 4.8–10.8)

## 2019-04-17 PROCEDURE — 99285 EMERGENCY DEPT VISIT HI MDM: CPT | Performed by: INTERNAL MEDICINE

## 2019-04-17 PROCEDURE — 99285 EMERGENCY DEPT VISIT HI MDM: CPT

## 2019-04-17 PROCEDURE — 83880 ASSAY OF NATRIURETIC PEPTIDE: CPT

## 2019-04-17 PROCEDURE — 71046 X-RAY EXAM CHEST 2 VIEWS: CPT

## 2019-04-17 PROCEDURE — 93005 ELECTROCARDIOGRAM TRACING: CPT

## 2019-04-17 PROCEDURE — 85025 COMPLETE CBC W/AUTO DIFF WBC: CPT

## 2019-04-17 PROCEDURE — 36415 COLL VENOUS BLD VENIPUNCTURE: CPT

## 2019-04-17 PROCEDURE — 80053 COMPREHEN METABOLIC PANEL: CPT

## 2019-04-17 PROCEDURE — 84484 ASSAY OF TROPONIN QUANT: CPT

## 2019-04-17 PROCEDURE — 99284 EMERGENCY DEPT VISIT MOD MDM: CPT | Performed by: NURSE PRACTITIONER

## 2019-04-17 ASSESSMENT — PAIN SCALES - GENERAL: PAINLEVEL_OUTOF10: 4

## 2019-04-17 ASSESSMENT — ENCOUNTER SYMPTOMS
WHEEZING: 0
CONSTIPATION: 0
ABDOMINAL PAIN: 0
COUGH: 0
COLOR CHANGE: 0
EYE DISCHARGE: 0
EYE ITCHING: 0
CHEST TIGHTNESS: 1
APNEA: 0
CHOKING: 0
STRIDOR: 0
SHORTNESS OF BREATH: 0
NAUSEA: 0
PHOTOPHOBIA: 0
VOMITING: 0
DIARRHEA: 0
BACK PAIN: 0

## 2019-04-17 ASSESSMENT — PAIN DESCRIPTION - ORIENTATION: ORIENTATION: MID

## 2019-04-17 ASSESSMENT — PAIN DESCRIPTION - PAIN TYPE: TYPE: ACUTE PAIN

## 2019-04-17 ASSESSMENT — PAIN DESCRIPTION - LOCATION: LOCATION: CHEST

## 2019-04-17 ASSESSMENT — PAIN DESCRIPTION - DESCRIPTORS: DESCRIPTORS: PRESSURE

## 2019-04-17 NOTE — ED NOTES
Bed: 07  Expected date:   Expected time:   Means of arrival:   Comments:  TAMELA Catalan RN  04/17/19 [id number]

## 2019-04-17 NOTE — ED PROVIDER NOTES
[address] [facility] EMERGENCY DEPT  eMERGENCYdEPARTMENT eNCOUnter      Pt Name: Leonides Reeder  MRN: [id number]  [place] 1950  Date of evaluation: 4/17/2019  Provider:KAREN Banda    CHIEF COMPLAINT       Chief Complaint   Patient presents with    Chest Pain     Started at [address]  (Location/Symptom, Timing/Onset, Context/Setting, Quality, Duration, Modifying Factors, Severity.)   Leonides Reeder is a 76 y.o. male who presents to the emergency department with complaints of chest tightness sternal. Started 3 am woke him up. He has taken his morning meds including baby aspirin. He has taken 1 nitro that arielle his pain 5/10 down to 3/10 in no distress. His blood pressure this morning was  alittle low he was taking it. Presently 147/87 not tachycardic admited in Jan by Sai Kimble to [place] for unstable angina. This has been happening to patient at rest. Denies SOA. No radiation. Pain is not pleuritic. HPI    Nursing Notes were reviewed and I agree. REVIEW OF SYSTEMS    (2-9 systems for level 4, 10 or more for level 5)     Review of Systems   Constitutional: Negative for activity change, appetite change, chills and fever. HENT: Negative for congestion and dental problem. Eyes: Negative for photophobia, discharge and itching. Respiratory: Positive for chest tightness. Negative for apnea, cough, choking, shortness of breath, wheezing and stridor. Cardiovascular: Negative for chest pain, palpitations and leg swelling. Gastrointestinal: Negative for abdominal pain, constipation, diarrhea, nausea and vomiting. Musculoskeletal: Negative for arthralgias, back pain, gait problem, myalgias and neck pain. Skin: Negative for color change, pallor and rash. Neurological: Negative for dizziness, seizures and syncope. Psychiatric/Behavioral: Negative for agitation. The patient is not nervous/anxious.          Except as noted above the remainder of the review of systems was reviewed and negative. PAST MEDICAL HISTORY     Past Medical History:   Diagnosis Date    Atrial fibrillation ([facility]     Atrial flutter ([facility]     CAD (coronary artery disease)     CAD (coronary atherosclerotic disease)     COPD (chronic obstructive pulmonary disease) ([facility]     Diabetes mellitus ([facility]     Ex-smoker     quit 2013 /smoked 50 yrs    History of amiodarone therapy     [facility]     [facility].  Hypertension     Hypokalemia     Hypotension     MI (myocardial infarction) ([facility]     S/P CABG x 4 11/11/2013 6/27/13    Stroke ([facility] 08/22/2017    eye         SURGICAL HISTORY       Past Surgical History:   Procedure Laterality Date    ABLATION OF DYSRHYTHMIC FOCUS      CARDIAC CATHETERIZATION  6/27/13  MDL    EF 45%    CARDIAC SURGERY      CABG x 4    CATARACT REMOVAL WITH IMPLANT Bilateral     CORONARY ARTERY BYPASS GRAFT  6/27/2013     Emergency CABG x 4, LIMA-DIAG, SVG-LAD, SVG-OM, SVG-PDA, RT EVH, DR DOLAN    CYST INCISION AND DRAINAGE N/A     RECTAL    EYE SURGERY      EYE SURGERY      cataract sx and implants (both eyes)    OTHER SURGICAL HISTORY      heart ablation    RETROPHYARYNGEAL ABCESS INCISION/DRAIN      Abcess near rectum         CURRENT MEDICATIONS       Discharge Medication List as of 4/17/2019  7:07 PM      CONTINUE these medications which have NOT CHANGED    Details   metoprolol succinate (TOPROL XL) 25 MG extended release tablet Take 25 mg by mouth dailyHistorical Med      isosorbide mononitrate (IMDUR) 60 MG extended release tablet Take 1 tablet by mouth 2 times daily, Disp-60 tablet, R-5Print      ranolazine (RANEXA) 1000 MG extended release tablet Take 1 tablet by mouth 2 times daily, Disp-60 tablet, R-5Print      nitroGLYCERIN (NITROSTAT) 0.4 MG SL tablet up to max of 3 total doses.  If no relief after 1 dose, call 911., Disp-25 tablet, R-5Normal      potassium chloride (KLOR-CON) 20 MEQ packet Take 1/2 tablet dailyHistorical Med atorvastatin (LIPITOR) 80 MG tablet Take 1 tablet by mouth nightly, Disp-90 tablet, R-3Print      ELIQUIS 5 MG TABS tablet TAKE 1 TABLET BY MOUTH 2 TIMES DAILY, R-3, DAWHistorical Med      furosemide (LASIX) 80 MG tablet Take 1 tablet by mouth daily, Disp-90 tablet, R-3Normal      aspirin 81 MG tablet Take 81 mg by mouth daily      Cholecalciferol (VITAMIN D) 2000 UNITS CAPS capsule Take 1/2 tablet dailyHistorical Med             ALLERGIES     Amiodarone; Azithromycin; Pcn [penicillins];  Penicillins; and Amiodarone    FAMILY HISTORY       Family History   Problem Relation Age of Onset    Stroke Father     Heart Disease Father     High Blood Pressure Father     High Cholesterol Father     Other Father     Heart Disease Other           SOCIAL HISTORY       Social History     Socioeconomic History    Marital status:      Spouse name: None    Number of children: None    Years of education: None    Highest education level: None   Occupational History    None   Social Needs    Financial resource strain: None    Food insecurity:     Worry: None     Inability: None    Transportation needs:     Medical: None     Non-medical: None   Tobacco Use    Smoking status: Former Smoker     Packs/day: 0.00     Types: Cigarettes    Smokeless tobacco: Never Used   Substance and Sexual Activity    Alcohol use: No    Drug use: No    Sexual activity: None   Lifestyle    Physical activity:     Days per week: None     Minutes per session: None    Stress: None   Relationships    Social connections:     Talks on phone: None     Gets together: None     Attends Zoroastrianism service: None     Active member of club or organization: None     Attends meetings of clubs or organizations: None     Relationship status: None    Intimate partner violence:     Fear of current or ex partner: None     Emotionally abused: None     Physically abused: None     Forced sexual activity: None   Other Topics Concern    None   Social History Narrative    ** Merged History Encounter **            SCREENINGS           PHYSICAL EXAM    (up to 7 forlevel 4, 8 or more for level 5)     ED Triage Vitals   BP Temp Temp Source Pulse Resp SpO2 Height Weight   04/17/19 0933 04/17/19 0933 04/17/19 0933 04/17/19 0933 04/17/19 0933 04/17/19 0933 04/17/19 0926 04/17/19 0926   (!) 147/87 98.5 °F (36.9 °C) Oral 72 12 98 % 6' (1.829 m) 226 lb (102.5 kg)       Physical Exam   Constitutional: He is oriented to person, place, and time. He appears well-developed and well-nourished. No distress. HENT:   Head: Normocephalic and atraumatic. Right Ear: External ear normal.   Left Ear: External ear normal.   Nose: Nose normal.   Mouth/Throat: Oropharynx is clear and moist.   Eyes: Pupils are equal, round, and reactive to light. Conjunctivae and EOM are normal.   Neck: Normal range of motion. Neck supple. No tracheal deviation present. Cardiovascular: Normal rate, regular rhythm, normal heart sounds and intact distal pulses. Exam reveals no gallop and no friction rub. No murmur heard. Pulmonary/Chest: Effort normal and breath sounds normal. No stridor. No respiratory distress. He has no wheezes. He has no rales. He exhibits no tenderness. Abdominal: Soft. Bowel sounds are normal. He exhibits no distension. There is no tenderness. Musculoskeletal: Normal range of motion. Arms:  Neurological: He is alert and oriented to person, place, and time. He displays normal reflexes. No cranial nerve deficit or sensory deficit. He exhibits normal muscle tone. Coordination normal.   Skin: Skin is warm and dry. Capillary refill takes less than 2 seconds. He is not diaphoretic. Psychiatric: He has a normal mood and affect. His behavior is normal.   Nursing note and vitals reviewed.         DIAGNOSTIC RESULTS     RADIOLOGY:   Non-plain film images such as CT, Ultrasound and MRI are read by the radiologist. Plain radiographic images are visualized and preliminarilyinterpreted by No att. providers found with the below findings:      Interpretation per the Radiologist below, if available at the time of this note:    XR CHEST STANDARD (2 VW)   Final Result   Impression: Shallow inspiration, no acute findings. Signed by Dr Kieran Brennan on 4/17/2019 9:56 AM          LABS:  Labs Reviewed   CBC WITH AUTO DIFFERENTIAL - Abnormal; Notable for the following components:       Result Value    RBC 4.57 (*)     MCV 97.6 (*)     MCH 32.6 (*)     All other components within normal limits   COMPREHENSIVE METABOLIC PANEL - Abnormal; Notable for the following components:    Chloride 97 (*)     Glucose 178 (*)     Calcium 10.3 (*)     All other components within normal limits   TROPONIN   BRAIN NATRIURETIC PEPTIDE   TROPONIN       All other labs were within normal range or notreturned as of this dictation. RE-ASSESSMENT        EMERGENCY DEPARTMENT COURSE and DIFFERENTIAL DIAGNOSIS/MDM:   Vitals:    Vitals:    04/17/19 1431 04/17/19 1501 04/17/19 1532 04/17/19 1901   BP: 101/71 107/70 102/71 109/76   Pulse: 60 62 63 66   Resp:       Temp:       TempSrc:       SpO2: 93% 94% 93% 97%   Weight:       Height:           MDM  Dr Maggie Kang came to see patient and feels he is medically stable to go home and follow up outpatient. Patient is agreeable. Plan to adjust his lopresser. PROCEDURES:    Procedures      FINAL IMPRESSION      1.  Chest pain, unspecified type          DISPOSITION/PLAN   DISPOSITION Decision To Discharge 04/17/2019 06:58:27 PM      PATIENT REFERRED TO:  [facility] [address] [facility]  [phone]      As needed, If symptoms worsen    [facility] EMERGENCY DEPT  [facility]  [phone]    As needed, If symptoms worsen      DISCHARGE MEDICATIONS:  Discharge Medication List as of 4/17/2019  7:07 PM      START taking these medications    Details   metoprolol tartrate (LOPRESSOR) 25 MG tablet Take 1 tablet by

## 2019-04-17 NOTE — CONSULTS
[facility]      ED Cardiology Consultation          I personally saw the patient and rounded with:  Deyanira Simental RN, on  4/17/19      The observations documented in this note, including the assessment and plan are mine              Date of Admission:  4/17/2019  9:25 AM    Date of Initially Being Seen / Consultation:  4/17/19    Cardiologist:  Bryan Khan MD     Cardiology Attending: [facility] Attending: ED     PCP:  Tr Lackey MD    Reason for Consultation or Admission / Chief Complaint:  Chest discomfort and out of rhythm    SUBJECTIVE AND HISTORY OF PRESENT ILLNESS:    Source of the history:  Patient, family, previous inpatient and outpatient records in [facility] [address] is a 76 y.o. male who presents to [facility] ED # 7 with symptoms / signs / problem or diagnosis of chest discomfort and out of rhythm. He woke up at 0130 this am with these symptoms. He went to his PCP who sent him to the ED. He is now in NSR and has no chest discomfort. Two weeks ago he was in [facility]note 3] and walked 18,000 steps without shortness of air or chest discomfort. When being examined this afternoon, he has had no symptoms of exertional chest discomfort, unusual or change in shortness of air, presyncope or syncope.        Family present:  Yes: wife      CARDIAC RISK PROFILE:    Risk Factor Yes / No / Unknown       Gender Male   Cigarette Use No, but did use in the past    Family History of Cardiovascular Disease Yes: stroke, heart disease, high disease, high blood pressure, high cholesterol   Diabetes Mellitus no   Hypercholesteremia no   Hypertension no          Cardiac Specific Problems:    Specialty Problems        Cardiology Problems    ACS (acute coronary syndrome) (HCC)        CAD (coronary artery disease)        Paroxysmal atrial fibrillation (HCC)        Syncope and collapse        Typical atrial flutter (HCC)        Chronic combined systolic and diastolic heart failure [facility])        Sick sinus syndrome ([facility]        Amaurosis fugax        Essential hypertension        Unstable angina ([facility]                PRIOR CARDIAC PROBLEM LIST  (IF APPLICABLE):    84/86/2608 CABG X4 LIMA-diag, VG-LAD, VG-OM, VG-PDA Kd Clancy)  11/10/2015  SE negative for myocardial ischemia  6/24/2016  TTE  Normal LVFX  7/2/2018 lexiscan Positive for inferior MI + myocardial ischemia, EF 50%, 12% ischemic myocardium on stress, intermediate risk findings, AUC indication 17, AUC score 7  7/11/18  Cath  Severe NVD, patent LIMA-LAD, patent yet extremely small VG-LAD, patent VG-RCA, closed VG-OM, anterior lateral akinesis, EF 35%      Past Medical History:    Past Medical History:   Diagnosis Date    Atrial fibrillation (HCC)     Atrial flutter ([facility]     CAD (coronary artery disease)     CAD (coronary atherosclerotic disease)     COPD (chronic obstructive pulmonary disease) ([facility]     Diabetes mellitus ([facility]     Ex-smoker     quit 2013 /smoked 50 yrs    History of amiodarone therapy     [facility]     [facility]  Hypertension     Hypokalemia     Hypotension     MI (myocardial infarction) ([facility]     S/P CABG x 4 11/11/2013 6/27/13    Stroke ([facility] 08/22/2017    eye         Past Surgical History:    Past Surgical History:   Procedure Laterality Date    ABLATION OF DYSRHYTHMIC FOCUS      CARDIAC CATHETERIZATION  6/27/13  MDL    EF 45%    CARDIAC SURGERY      CABG x 4    CATARACT REMOVAL WITH IMPLANT Bilateral     CORONARY ARTERY BYPASS GRAFT  6/27/2013     Emergency CABG x 4, LIMA-DIAG, SVG-LAD, SVG-OM, SVG-PDA, RT EVH, DR DOLAN    CYST INCISION AND DRAINAGE N/A     RECTAL    EYE SURGERY      EYE SURGERY      cataract sx and implants (both eyes)    OTHER SURGICAL HISTORY      heart ablation    RETROPHYARYNGEAL ABCESS INCISION/DRAIN      Abcess near rectum         Home Medications:   Prior to Admission medications    Medication Sig Start Date End Date Taking? Authorizing Provider   metoprolol succinate (TOPROL XL) 25 MG extended release tablet Take 25 mg by mouth daily   Yes Historical Provider, MD   isosorbide mononitrate (IMDUR) 60 MG extended release tablet Take 1 tablet by mouth 2 times daily 1/28/19  Yes BERNARDO Moncada   ranolazine (RANEXA) 1000 MG extended release tablet Take 1 tablet by mouth 2 times daily 1/28/19  Yes BERNARDO Moncada   nitroGLYCERIN (NITROSTAT) 0.4 MG SL tablet up to max of 3 total doses. If no relief after 1 dose, call 911. 12/27/18  Yes Jann Dallas MD   potassium chloride (KLOR-CON) 20 MEQ packet Take 1/2 tablet daily   Yes Historical Provider, MD   atorvastatin (LIPITOR) 80 MG tablet Take 1 tablet by mouth nightly 8/14/18  Yes BERNARDO Reyna   ELIQUIS 5 MG TABS tablet TAKE 1 TABLET BY MOUTH 2 TIMES DAILY 10/8/17  Yes Historical Provider, MD   furosemide (LASIX) 80 MG tablet Take 1 tablet by mouth daily 6/15/17  Yes BERNARDO Benitez   aspirin 81 MG tablet Take 81 mg by mouth daily   Yes Historical Provider, MD   Cholecalciferol (VITAMIN D) 2000 UNITS CAPS capsule Take 1/2 tablet daily   Yes Historical Provider, MD        Facility Administered Medications: Allergies:  Amiodarone; Azithromycin; Pcn [penicillins];  Penicillins; and Amiodarone     Social History:       Social History     Socioeconomic History    Marital status:      Spouse name: Not on file    Number of children: Not on file    Years of education: Not on file    Highest education level: Not on file   Occupational History    Not on file   Social Needs    Financial resource strain: Not on file    Food insecurity:     Worry: Not on file     Inability: Not on file    Transportation needs:     Medical: Not on file     Non-medical: Not on file   Tobacco Use    Smoking status: Former Smoker     Packs/day: 0.00     Types: Cigarettes    Smokeless tobacco: Never Used   Substance and Sexual Activity    Alcohol use: No    Drug use: No    Sexual activity: Not on file   Lifestyle    Physical activity:     Days per week: Not on file     Minutes per session: Not on file    Stress: Not on file   Relationships    Social connections:     Talks on phone: Not on file     Gets together: Not on file     Attends Evangelical service: Not on file     Active member of club or organization: Not on file     Attends meetings of clubs or organizations: Not on file     Relationship status: Not on file    Intimate partner violence:     Fear of current or ex partner: Not on file     Emotionally abused: Not on file     Physically abused: Not on file     Forced sexual activity: Not on file   Other Topics Concern    Not on file   Social History Narrative    ** Merged History Encounter **            Family History:     Family History   Problem Relation Age of Onset    Stroke Father     Heart Disease Father     High Blood Pressure Father     High Cholesterol Father     Other Father     Heart Disease Other          REVIEW OF SYSTEMS:     Except as noted in the HPI, all other systems are negative        PHYSICAL EXAMINATION:     /71   Pulse 63   Temp 98.5 °F (36.9 °C) (Oral)   Resp 12   Ht 6' (1.829 m)   Wt 226 lb (102.5 kg)   SpO2 93%   BMI 30.65 kg/m²     GENERAL - well developed and well nourished, in mild amount of generalized distress; is an active participant in this examination  HEENT -  PERRLA, Hearing appears normal, conjunctiva and lids are normal, ears and nose appear normal  NECK - no thyromegaly, no JVD, trachea is in the midline  CARDIOVASCULAR - PMI is in the left mid line clavicular position, Normal S1 and S2 with a grade 1/6 systolic murmur. No S3 or S4    PULMONARY - No respiratory distress. scattered wheezes and rales.   Breath sounds in both  lung fields are Decreased  ABDOMEN  - soft, non tender, no rebound, no hepatomegaly or splenomegaly  MUSCULOSKELETAL  - Prone/Supine, digitals and nails are without clubbing or cyanosis  EXTREMITIES - trace edema  NEUROLOGIC - cranial nerves, II-XII, are normal  SKIN - turgor is normal, no rash  PSYCHIATRIC - normal mood and affect, alert and orientated x 3, judgement and insight appear appropriate      LABORATORY EVALUATION & TESTING:    I have personally reviewed and interpreted the results of the following diagnostic testing      EKG and or Telemetry:  which was personally reviewed me:  Sinus rhythm,   Pulse Readings from Last 1 Encounters:   04/17/19 63    bpm,  without Acute changes    Troponin:  negative myocardial necrosis (  <0.01); the creatinine is normal    CBC:   Recent Labs     04/17/19  0930   WBC 7.0   HGB 14.9   HCT 44.6   MCV 97.6*        BMP:   Recent Labs     04/17/19  0930      K 3.7   CL 97*   CO2 28   BUN 17   CREATININE 1.0     Cardiac Enzymes:   Recent Labs     04/17/19  0930 04/17/19  1146   TROPONINI <0.01 <0.01     PT/INR: No results for input(s): PROTIME, INR in the last 72 hours. APTT: No results for input(s): APTT in the last 72 hours.   Liver Profile:  Lab Results   Component Value Date    AST 13 04/17/2019    ALT 10 04/17/2019    BILIDIR 0.2 06/07/2015    BILITOT 0.8 04/17/2019    ALKPHOS 69 04/17/2019     Lab Results   Component Value Date    CHOL 122 01/25/2019    HDL 41 01/25/2019    TRIG 121 01/25/2019     TSH:  No results found for: TSH  UA:   Lab Results   Component Value Date    COLORU YELLOW 08/28/2016    PHUR 5.5 08/28/2016    CLARITYU Clear 08/28/2016    SPECGRAV 1.016 08/28/2016    LEUKOCYTESUR Negative 08/28/2016    UROBILINOGEN 0.2 08/28/2016    BILIRUBINUR Negative 08/28/2016    BLOODU Negative 08/28/2016    GLUCOSEU Negative 08/28/2016             ALL THE CARDIOLOGY PROBLEMS ARE LISTED ABOVE; HOWEVER, THE FOLLOWING SPECIFIC CARDIAC PROBLEMS WERE ADDRESSED AND  TREATED DURING [facility] DECISION MAKING               Cardiac Specific Problem / Diagnosis  Discussion and Data Reviewed Diagnostic Procedures Ordered Management Options Selected increase the Toprol to 25 mg orally twice a day. The chest discomfort probably is related to palpitations and fast rate with demand ischemia      DISCUSSION AND PLAN:    1. I had a detailed discussion with the patient and / or family regarding the historical points, physical examination findings, and any diagnostic results supporting the admission diagnosis. The patient was educated on care and need for admission. questions were invited and answered. Patient and / or family shows understanding of admission information and agrees to follow. 2. Continue present medications except for changes as noted above    3. Continue to monitor rhythm    4. Further orders per clinical course    5. Follow up in the office as arranged      Discussed with patient and spouse and nursing.     I greatly appreciate the opportunity and your confidence in allowing me to participate in the care of Carol Kramer    Electronically signed by Emmy Pate MD on 4/17/19     [facility] [place]

## 2019-04-18 LAB
EKG P AXIS: 36 DEGREES
EKG P-R INTERVAL: 208 MS
EKG Q-T INTERVAL: 426 MS
EKG QRS DURATION: 90 MS
EKG QTC CALCULATION (BAZETT): 429 MS
EKG T AXIS: 46 DEGREES

## 2019-04-21 LAB
EKG P AXIS: 62 DEGREES
EKG P-R INTERVAL: 214 MS
EKG Q-T INTERVAL: 424 MS
EKG QRS DURATION: 98 MS
EKG QTC CALCULATION (BAZETT): 439 MS
EKG T AXIS: 58 DEGREES

## 2019-05-15 ENCOUNTER — TRANSCRIBE ORDERS (OUTPATIENT)
Dept: PULMONOLOGY | Facility: CLINIC | Age: 69
End: 2019-05-15

## 2019-05-15 DIAGNOSIS — Z87.891 PERSONAL HISTORY OF TOBACCO USE, PRESENTING HAZARDS TO HEALTH: Primary | ICD-10-CM

## 2019-06-10 RX ORDER — RANOLAZINE 1000 MG/1
1000 TABLET, EXTENDED RELEASE ORAL 2 TIMES DAILY
Qty: 16 TABLET | Refills: 0 | Status: SHIPPED | OUTPATIENT
Start: 2019-06-10

## 2019-06-17 ENCOUNTER — OFFICE VISIT (OUTPATIENT)
Dept: PULMONOLOGY | Facility: CLINIC | Age: 69
End: 2019-06-17

## 2019-06-17 VITALS
BODY MASS INDEX: 30.88 KG/M2 | OXYGEN SATURATION: 99 % | DIASTOLIC BLOOD PRESSURE: 70 MMHG | HEIGHT: 72 IN | HEART RATE: 65 BPM | SYSTOLIC BLOOD PRESSURE: 130 MMHG | WEIGHT: 228 LBS

## 2019-06-17 DIAGNOSIS — Z87.891 PERSONAL HISTORY OF NICOTINE DEPENDENCE: ICD-10-CM

## 2019-06-17 DIAGNOSIS — E66.3 OVERWEIGHT: ICD-10-CM

## 2019-06-17 DIAGNOSIS — J41.0 SIMPLE CHRONIC BRONCHITIS (HCC): Primary | ICD-10-CM

## 2019-06-17 PROCEDURE — 99213 OFFICE O/P EST LOW 20 MIN: CPT | Performed by: INTERNAL MEDICINE

## 2019-06-17 RX ORDER — ISOSORBIDE MONONITRATE 60 MG/1
60 TABLET, EXTENDED RELEASE ORAL 2 TIMES DAILY
COMMUNITY
Start: 2019-01-28

## 2019-06-17 RX ORDER — NITROGLYCERIN 0.4 MG/1
TABLET SUBLINGUAL
COMMUNITY
Start: 2018-12-27

## 2019-06-17 RX ORDER — RANOLAZINE 1000 MG/1
1000 TABLET, EXTENDED RELEASE ORAL 2 TIMES DAILY
COMMUNITY
Start: 2019-06-10

## 2019-06-17 NOTE — PROGRESS NOTES
Dulce Ramey is a 68 y.o. male.     Chief Complaint   Patient presents with   • Cough   • Shortness of Breath      No My Sticky Note on file.    History of Present Illness   The patient comes in today for follow-up.  His major complaint is of difficulty in clearing secretions at times and they seem to stick in his throat.  I advised him to use Mucinex for this.  He has had to have his cardiac medications adjusted.  He has coughed up some phlegm occasionally some blood-tinged to it.  He is on Eliquis because of his atrial arrhythmias.  He has had problems with some arrhythmias recently and is even had some syncopal episodes and has had his medications adjusted and states this has resulted in better control of his arrhythmias.  He does still have some chest pain but he states this is less of a problem than it was previously.  He does have some cough and also has some dyspnea.  He has some chronic bronchitis related to his past history of tobacco use but he clearly does not have COPD by his last pulmonary function studies so I suspect his dyspnea is predominately from his cardiac disease.    Medical/Family/Social History   has a past medical history of Atrial fibrillation (CMS/[facility]), COPD (chronic obstructive pulmonary disease) (CMS/[facility]), Diabetes mellitus (CMS/HCC), Heart disease, Hypertension, and Neoplasm of uncertain behavior of skin.   has a past surgical history that includes Cataract extraction; Other surgical history; and Ablation of dysrhythmic focus.  family history includes Diabetes in his daughter; Heart disease in his father; Hypertension in his father.   reports that he quit smoking about 6 years ago. He has never used smokeless tobacco. He reports that he does not drink alcohol or use drugs.  Allergies   Allergen Reactions   • Amiodarone Rash   • Penicillins      Medications    Current Outpatient Medications:   •  apixaban (ELIQUIS) 5 MG tablet tablet, TAKE 1 TABLET BY MOUTH 2 TIMES DAILY,  "Disp: , Rfl:   •  aspirin 81 MG tablet, Take 81 mg by mouth daily, Disp: , Rfl:   •  atorvastatin (LIPITOR) 20 MG tablet, Take 80 mg by mouth., Disp: , Rfl:   •  Cholecalciferol (VITAMIN D) 2000 UNITS capsule, Take 2,000 Units by mouth nightly , Disp: , Rfl:   •  furosemide (LASIX) 80 MG tablet, Take  by mouth., Disp: , Rfl:   •  isosorbide mononitrate (IMDUR) 60 MG 24 hr tablet, Take 60 mg by mouth., Disp: , Rfl:   •  metoprolol succinate XL (TOPROL-XL) 25 MG 24 hr tablet, Take  by mouth. Extended Release, Disp: , Rfl:   •  metoprolol tartrate (LOPRESSOR) 25 MG tablet, Take 25 mg by mouth., Disp: , Rfl:   •  nitroglycerin (NITROSTAT) 0.4 MG SL tablet, up to max of 3 total doses. If no relief after 1 dose, call 911., Disp: , Rfl:   •  Potassium 99 MG tablet, Take  by mouth., Disp: , Rfl:   •  potassium chloride (MICRO-K) 10 MEQ CR capsule, Take 2 capsules by mouth every evening, Disp: , Rfl:   •  ranolazine (RANEXA) 1000 MG 12 hr tablet, Take 1,000 mg by mouth., Disp: , Rfl:     Review of Systems   Constitutional: Negative for chills and fever.   HENT: Negative for congestion.    Eyes: Negative for visual disturbance.   Respiratory: Positive for cough and shortness of breath.    Cardiovascular: Positive for chest pain and palpitations.   Gastrointestinal: Negative for diarrhea, nausea and vomiting.   Genitourinary: Negative for difficulty urinating.   Musculoskeletal: Negative for arthralgias.   Skin: Negative for rash.   Neurological: Positive for syncope. Negative for dizziness and speech difficulty.   Hematological: Negative for adenopathy.   Psychiatric/Behavioral: The patient is not nervous/anxious.      ------------------------------------  Objective   /70   Pulse 65   Ht 182.9 cm (72\")   Wt 103 kg (228 lb)   SpO2 99% Comment: RA  BMI 30.92 kg/m²   Physical Exam   Constitutional: He is oriented to person, place, and time. He appears well-developed.   He is a pleasant minimally overweight white male. "   HENT:   Head: Normocephalic and atraumatic.   Eyes: EOM are normal. Pupils are equal, round, and reactive to light.   Neck: Normal range of motion. Neck supple.   Cardiovascular: Normal rate, regular rhythm and normal heart sounds.   He has a regular rate and rhythm today on exam.   Pulmonary/Chest: Effort normal and breath sounds normal.   Abdominal: Soft.   Musculoskeletal: Normal range of motion.   Neurological: He is alert and oriented to person, place, and time.   Skin: Skin is warm and dry.   Psychiatric: He has a normal mood and affect.   Nursing note and vitals reviewed.          Pulmonary Functions Testing Results:  No results found for: FEV1, FVC, USJ4LLW, TLC, DLCO     Assessment/Plan   Eric was seen today for cough and shortness of breath.    Diagnoses and all orders for this visit:    Simple chronic bronchitis (CMS/HCC)    Personal history of nicotine dependence  -     CT Chest Low Dose Wo; Future    Overweight      Patient's Body mass index is 30.92 kg/m². BMI is above normal parameters. Recommendations include: referral to primary care.      Again, he said he may have some chronic bronchitis related to his past history of back use but clearly does not have COPD and if he has dyspnea on a chronic basis I suspect this relates prolonged to his cardiac problems.  I did tell him to try Mucinex to help loosen secretions.  We will get a chest CT for lung cancer screening purposes.  He had actually ready been scheduled for this but he was not aware of this so we will reschedule this at [facility] and call him with results.  Assuming it is negative for any significant abnormalities we will just continue yearly screening CTs as long as he meets criteria and see him back in the office here in 1 year as well.

## 2019-06-17 NOTE — PATIENT INSTRUCTIONS
I did advise the patient with his smoking history and due to the fact that he just quit smoking about 6 years ago we can get yearly CAT scans for lung cancer screening purposes.  I will schedule one at [facility] and call him with results and assuming it shows no significant abnormalities I will just see him on a yearly basis with screening CTs as long as he meets criteria.

## 2019-07-01 ENCOUNTER — HOSPITAL ENCOUNTER (OUTPATIENT)
Dept: CT IMAGING | Age: 69
Discharge: HOME | End: 2019-07-01
Payer: MEDICARE

## 2019-07-01 DIAGNOSIS — Z87.891 PERSONAL HISTORY OF NICOTINE DEPENDENCE: ICD-10-CM

## 2019-07-01 DIAGNOSIS — Z87.891 PERSONAL HISTORY OF TOBACCO USE, PRESENTING HAZARDS TO HEALTH: ICD-10-CM

## 2019-07-01 PROCEDURE — G0297 LDCT FOR LUNG CA SCREEN: HCPCS

## 2019-07-30 ENCOUNTER — OFFICE VISIT (OUTPATIENT)
Dept: CARDIOLOGY | Age: 69
End: 2019-07-30
Payer: MEDICARE

## 2019-07-30 VITALS
HEIGHT: 72 IN | HEART RATE: 60 BPM | BODY MASS INDEX: 31.15 KG/M2 | DIASTOLIC BLOOD PRESSURE: 82 MMHG | SYSTOLIC BLOOD PRESSURE: 126 MMHG | WEIGHT: 230 LBS

## 2019-07-30 DIAGNOSIS — I25.10 CORONARY ARTERY DISEASE INVOLVING NATIVE CORONARY ARTERY OF NATIVE HEART WITHOUT ANGINA PECTORIS: Primary | ICD-10-CM

## 2019-07-30 DIAGNOSIS — I48.0 PAROXYSMAL ATRIAL FIBRILLATION (HCC): ICD-10-CM

## 2019-07-30 DIAGNOSIS — I10 ESSENTIAL HYPERTENSION: ICD-10-CM

## 2019-07-30 PROCEDURE — 99212 OFFICE O/P EST SF 10 MIN: CPT | Performed by: INTERNAL MEDICINE

## 2019-07-30 NOTE — PROGRESS NOTES
Medication Sig Dispense Refill    ranolazine (RANEXA) 1000 MG extended release tablet Take 1 tablet by mouth 2 times daily 16 tablet 0    metoprolol tartrate (LOPRESSOR) 25 MG tablet Take 1 tablet by mouth 2 times daily 60 tablet 0    isosorbide mononitrate (IMDUR) 60 MG extended release tablet Take 1 tablet by mouth 2 times daily 60 tablet 5    nitroGLYCERIN (NITROSTAT) 0.4 MG SL tablet up to max of 3 total doses. If no relief after 1 dose, call 911. 25 tablet 5    potassium chloride (KLOR-CON) 20 MEQ packet Take 1/2 tablet daily      atorvastatin (LIPITOR) 80 MG tablet Take 1 tablet by mouth nightly 90 tablet 3    ELIQUIS 5 MG TABS tablet TAKE 1 TABLET BY MOUTH 2 TIMES DAILY  3    furosemide (LASIX) 80 MG tablet Take 1 tablet by mouth daily 90 tablet 3    aspirin 81 MG tablet Take 81 mg by mouth daily      Cholecalciferol (VITAMIN D) 2000 UNITS CAPS capsule Take 1/2 tablet daily       No current facility-administered medications for this visit. Allergies: Amiodarone; Azithromycin; Pcn [penicillins]; and Penicillins  Past Medical History:   Diagnosis Date    Atrial fibrillation (HCC)     Atrial flutter (HCC)     CAD (coronary artery disease)     CAD (coronary atherosclerotic disease)     [facility]     Diabetes mellitus ([facility]     Ex-smoker     quit 2013 /smoked 50 yrs    History of amiodarone therapy     [facility]      Hypertension     Hypokalemia     Hypotension     MI (myocardial infarction) ([facility]     S/P CABG x 4 11/11/2013 6/27/13    Stroke ([facility] 08/22/2017    eye     Past Surgical History:   Procedure Laterality Date    ABLATION OF DYSRHYTHMIC FOCUS      CARDIAC CATHETERIZATION  6/27/13  MDL    EF 45%    CARDIAC SURGERY      CABG x 4    CATARACT REMOVAL WITH IMPLANT Bilateral     CORONARY ARTERY BYPASS GRAFT  6/27/2013     Emergency CABG x 4, LIMA-DIAG, SVG-LAD, SVG-OM, SVG-PDA, RT EVLYNETTE,  performed the services described in this documentation as scribed by Josh Mei in my presence, and it is both accurate and complete. Electronically signed by Brittany De La Paz.  Suma Mireles MD, [facility]    8/1/19 12:55 PM

## 2019-10-10 ENCOUNTER — APPOINTMENT (OUTPATIENT)
Dept: GENERAL RADIOLOGY | Age: 69
DRG: 287 | End: 2019-10-10
Payer: MEDICARE

## 2019-10-10 ENCOUNTER — HOSPITAL ENCOUNTER (INPATIENT)
Age: 69
LOS: 1 days | Discharge: HOME | DRG: 287 | End: 2019-10-12
Attending: EMERGENCY MEDICINE | Admitting: INTERNAL MEDICINE
Payer: MEDICARE

## 2019-10-10 DIAGNOSIS — R07.9 CHEST PAIN, UNSPECIFIED TYPE: Primary | ICD-10-CM

## 2019-10-10 LAB
ALBUMIN SERPL-MCNC: 4 G/DL (ref 3.5–5.2)
ALP BLD-CCNC: 54 U/L (ref 40–130)
ALT SERPL-CCNC: 13 U/L (ref 5–41)
ANION GAP SERPL CALCULATED.3IONS-SCNC: 9 MMOL/L (ref 7–19)
AST SERPL-CCNC: 16 U/L (ref 5–40)
BASOPHILS ABSOLUTE: 0.1 K/UL (ref 0–0.2)
BASOPHILS RELATIVE PERCENT: 0.9 % (ref 0–1)
BILIRUB SERPL-MCNC: 1 MG/DL (ref 0.2–1.2)
BILIRUBIN URINE: NEGATIVE
BLOOD, URINE: NEGATIVE
BUN BLDV-MCNC: 15 MG/DL (ref 8–23)
CALCIUM SERPL-MCNC: 10.3 MG/DL (ref 8.8–10.2)
CHLORIDE BLD-SCNC: 99 MMOL/L (ref 98–111)
CLARITY: ABNORMAL
CO2: 30 MMOL/L (ref 22–29)
COLOR: YELLOW
CREAT SERPL-MCNC: 0.9 MG/DL (ref 0.5–1.2)
EOSINOPHILS ABSOLUTE: 0.2 K/UL (ref 0–0.6)
EOSINOPHILS RELATIVE PERCENT: 1.5 % (ref 0–5)
GFR NON-AFRICAN AMERICAN: >60
GLUCOSE BLD-MCNC: 119 MG/DL (ref 74–109)
GLUCOSE URINE: NEGATIVE MG/DL
HCT VFR BLD CALC: 43.2 % (ref 42–52)
HEMOGLOBIN: 14.5 G/DL (ref 14–18)
IMMATURE GRANULOCYTES #: 0.3 K/UL
KETONES, URINE: NEGATIVE MG/DL
LEUKOCYTE ESTERASE, URINE: NEGATIVE
LYMPHOCYTES ABSOLUTE: 2.7 K/UL (ref 1.1–4.5)
LYMPHOCYTES RELATIVE PERCENT: 27.1 % (ref 20–40)
MCH RBC QN AUTO: 33.1 PG (ref 27–31)
MCHC RBC AUTO-ENTMCNC: 33.6 G/DL (ref 33–37)
MCV RBC AUTO: 98.6 FL (ref 80–94)
MONOCYTES ABSOLUTE: 0.9 K/UL (ref 0–0.9)
MONOCYTES RELATIVE PERCENT: 8.9 % (ref 0–10)
NEUTROPHILS ABSOLUTE: 5.8 K/UL (ref 1.5–7.5)
NEUTROPHILS RELATIVE PERCENT: 58.8 % (ref 50–65)
NITRITE, URINE: NEGATIVE
PDW BLD-RTO: 12.8 % (ref 11.5–14.5)
PH UA: 7.5 (ref 5–8)
PLATELET # BLD: 284 K/UL (ref 130–400)
PMV BLD AUTO: 10.5 FL (ref 9.4–12.4)
POTASSIUM REFLEX MAGNESIUM: 4 MMOL/L (ref 3.5–5)
PROTEIN UA: NEGATIVE MG/DL
RBC # BLD: 4.38 M/UL (ref 4.7–6.1)
SODIUM BLD-SCNC: 138 MMOL/L (ref 136–145)
SPECIFIC GRAVITY UA: 1.02 (ref 1–1.03)
TOTAL PROTEIN: 7.1 G/DL (ref 6.6–8.7)
TROPONIN: <0.01 NG/ML (ref 0–0.03)
TROPONIN: <0.01 NG/ML (ref 0–0.03)
TSH SERPL DL<=0.05 MIU/L-ACNC: 3 UIU/ML (ref 0.27–4.2)
URINE REFLEX TO CULTURE: ABNORMAL
UROBILINOGEN, URINE: 1 E.U./DL
WBC # BLD: 9.9 K/UL (ref 4.8–10.8)

## 2019-10-10 PROCEDURE — 6370000000 HC RX 637 (ALT 250 FOR IP): Performed by: EMERGENCY MEDICINE

## 2019-10-10 PROCEDURE — 80053 COMPREHEN METABOLIC PANEL: CPT

## 2019-10-10 PROCEDURE — 2580000003 HC RX 258: Performed by: INTERNAL MEDICINE

## 2019-10-10 PROCEDURE — 36415 COLL VENOUS BLD VENIPUNCTURE: CPT

## 2019-10-10 PROCEDURE — G0378 HOSPITAL OBSERVATION PER HR: HCPCS

## 2019-10-10 PROCEDURE — 99285 EMERGENCY DEPT VISIT HI MDM: CPT

## 2019-10-10 PROCEDURE — 71046 X-RAY EXAM CHEST 2 VIEWS: CPT

## 2019-10-10 PROCEDURE — 85025 COMPLETE CBC W/AUTO DIFF WBC: CPT

## 2019-10-10 PROCEDURE — 84443 ASSAY THYROID STIM HORMONE: CPT

## 2019-10-10 PROCEDURE — 93005 ELECTROCARDIOGRAM TRACING: CPT | Performed by: EMERGENCY MEDICINE

## 2019-10-10 PROCEDURE — 81003 URINALYSIS AUTO W/O SCOPE: CPT

## 2019-10-10 PROCEDURE — 6370000000 HC RX 637 (ALT 250 FOR IP): Performed by: INTERNAL MEDICINE

## 2019-10-10 PROCEDURE — 84484 ASSAY OF TROPONIN QUANT: CPT

## 2019-10-10 RX ORDER — SODIUM CHLORIDE 0.9 % (FLUSH) 0.9 %
10 SYRINGE (ML) INJECTION EVERY 12 HOURS SCHEDULED
Status: DISCONTINUED | OUTPATIENT
Start: 2019-10-10 | End: 2019-10-12 | Stop reason: SDUPTHER

## 2019-10-10 RX ORDER — ONDANSETRON 2 MG/ML
4 INJECTION INTRAMUSCULAR; INTRAVENOUS EVERY 6 HOURS PRN
Status: DISCONTINUED | OUTPATIENT
Start: 2019-10-10 | End: 2019-10-12 | Stop reason: HOSPADM

## 2019-10-10 RX ORDER — ASPIRIN 81 MG/1
81 TABLET, CHEWABLE ORAL DAILY
Status: DISCONTINUED | OUTPATIENT
Start: 2019-10-11 | End: 2019-10-11

## 2019-10-10 RX ORDER — SODIUM CHLORIDE 0.9 % (FLUSH) 0.9 %
10 SYRINGE (ML) INJECTION PRN
Status: DISCONTINUED | OUTPATIENT
Start: 2019-10-10 | End: 2019-10-12 | Stop reason: SDUPTHER

## 2019-10-10 RX ORDER — ASPIRIN 81 MG/1
324 TABLET, CHEWABLE ORAL ONCE
Status: COMPLETED | OUTPATIENT
Start: 2019-10-10 | End: 2019-10-10

## 2019-10-10 RX ADMIN — Medication 10 ML: at 23:22

## 2019-10-10 RX ADMIN — NITROGLYCERIN 0.5 INCH: 20 OINTMENT TOPICAL at 23:33

## 2019-10-10 RX ADMIN — ASPIRIN 81 MG 324 MG: 81 TABLET ORAL at 18:59

## 2019-10-10 ASSESSMENT — PAIN DESCRIPTION - LOCATION: LOCATION: CHEST

## 2019-10-10 ASSESSMENT — PAIN DESCRIPTION - PAIN TYPE
TYPE: ACUTE PAIN
TYPE: ACUTE PAIN

## 2019-10-10 ASSESSMENT — PAIN SCALES - GENERAL
PAINLEVEL_OUTOF10: 3
PAINLEVEL_OUTOF10: 0

## 2019-10-10 ASSESSMENT — HEART SCORE: ECG: 1

## 2019-10-11 ENCOUNTER — APPOINTMENT (OUTPATIENT)
Dept: NUCLEAR MEDICINE | Age: 69
DRG: 287 | End: 2019-10-11
Payer: MEDICARE

## 2019-10-11 LAB
HCT VFR BLD CALC: 40.5 % (ref 42–52)
HEMOGLOBIN: 13.4 G/DL (ref 14–18)
MCH RBC QN AUTO: 32.5 PG (ref 27–31)
MCHC RBC AUTO-ENTMCNC: 33.1 G/DL (ref 33–37)
MCV RBC AUTO: 98.3 FL (ref 80–94)
PDW BLD-RTO: 12.7 % (ref 11.5–14.5)
PLATELET # BLD: 241 K/UL (ref 130–400)
PMV BLD AUTO: 9.8 FL (ref 9.4–12.4)
RBC # BLD: 4.12 M/UL (ref 4.7–6.1)
TROPONIN: <0.01 NG/ML (ref 0–0.03)
TROPONIN: <0.01 NG/ML (ref 0–0.03)
WBC # BLD: 8.5 K/UL (ref 4.8–10.8)

## 2019-10-11 PROCEDURE — G0378 HOSPITAL OBSERVATION PER HR: HCPCS

## 2019-10-11 PROCEDURE — 6360000002 HC RX W HCPCS: Performed by: INTERNAL MEDICINE

## 2019-10-11 PROCEDURE — 78452 HT MUSCLE IMAGE SPECT MULT: CPT

## 2019-10-11 PROCEDURE — 84484 ASSAY OF TROPONIN QUANT: CPT

## 2019-10-11 PROCEDURE — 85027 COMPLETE CBC AUTOMATED: CPT

## 2019-10-11 PROCEDURE — A9500 TC99M SESTAMIBI: HCPCS | Performed by: INTERNAL MEDICINE

## 2019-10-11 PROCEDURE — 3430000000 HC RX DIAGNOSTIC RADIOPHARMACEUTICAL: Performed by: INTERNAL MEDICINE

## 2019-10-11 PROCEDURE — 99223 1ST HOSP IP/OBS HIGH 75: CPT | Performed by: INTERNAL MEDICINE

## 2019-10-11 PROCEDURE — 2580000003 HC RX 258: Performed by: NURSE PRACTITIONER

## 2019-10-11 PROCEDURE — 6370000000 HC RX 637 (ALT 250 FOR IP): Performed by: INTERNAL MEDICINE

## 2019-10-11 PROCEDURE — 2580000003 HC RX 258: Performed by: INTERNAL MEDICINE

## 2019-10-11 PROCEDURE — 36415 COLL VENOUS BLD VENIPUNCTURE: CPT

## 2019-10-11 PROCEDURE — 93017 CV STRESS TEST TRACING ONLY: CPT

## 2019-10-11 PROCEDURE — 6370000000 HC RX 637 (ALT 250 FOR IP): Performed by: NURSE PRACTITIONER

## 2019-10-11 RX ORDER — FUROSEMIDE 40 MG/1
80 TABLET ORAL DAILY
Status: DISCONTINUED | OUTPATIENT
Start: 2019-10-11 | End: 2019-10-12 | Stop reason: HOSPADM

## 2019-10-11 RX ORDER — ISOSORBIDE MONONITRATE 60 MG/1
60 TABLET, EXTENDED RELEASE ORAL 2 TIMES DAILY
Status: DISCONTINUED | OUTPATIENT
Start: 2019-10-11 | End: 2019-10-11

## 2019-10-11 RX ORDER — ATORVASTATIN CALCIUM 80 MG/1
80 TABLET, FILM COATED ORAL NIGHTLY
Status: DISCONTINUED | OUTPATIENT
Start: 2019-10-11 | End: 2019-10-12 | Stop reason: HOSPADM

## 2019-10-11 RX ORDER — RANOLAZINE 500 MG/1
1000 TABLET, EXTENDED RELEASE ORAL 2 TIMES DAILY
Status: DISCONTINUED | OUTPATIENT
Start: 2019-10-11 | End: 2019-10-12 | Stop reason: HOSPADM

## 2019-10-11 RX ORDER — ASPIRIN 81 MG/1
81 TABLET, CHEWABLE ORAL DAILY
Status: DISCONTINUED | OUTPATIENT
Start: 2019-10-11 | End: 2019-10-12 | Stop reason: HOSPADM

## 2019-10-11 RX ORDER — NITROGLYCERIN 0.4 MG/1
0.4 TABLET SUBLINGUAL EVERY 5 MIN PRN
Status: DISCONTINUED | OUTPATIENT
Start: 2019-10-11 | End: 2019-10-12 | Stop reason: HOSPADM

## 2019-10-11 RX ORDER — POTASSIUM CHLORIDE 750 MG/1
10 TABLET, EXTENDED RELEASE ORAL DAILY
Status: DISCONTINUED | OUTPATIENT
Start: 2019-10-11 | End: 2019-10-11 | Stop reason: CLARIF

## 2019-10-11 RX ORDER — POTASSIUM CHLORIDE 750 MG/1
10 TABLET, EXTENDED RELEASE ORAL DAILY
Status: DISCONTINUED | OUTPATIENT
Start: 2019-10-11 | End: 2019-10-12 | Stop reason: HOSPADM

## 2019-10-11 RX ADMIN — APIXABAN 5 MG: 5 TABLET, FILM COATED ORAL at 21:43

## 2019-10-11 RX ADMIN — METOPROLOL TARTRATE 25 MG: 25 TABLET ORAL at 21:43

## 2019-10-11 RX ADMIN — POTASSIUM CHLORIDE 10 MEQ: 750 TABLET, EXTENDED RELEASE ORAL at 21:43

## 2019-10-11 RX ADMIN — TETRAKIS(2-METHOXYISOBUTYLISOCYANIDE)COPPER(I) TETRAFLUOROBORATE 10 MILLICURIE: 1 INJECTION, POWDER, LYOPHILIZED, FOR SOLUTION INTRAVENOUS at 16:07

## 2019-10-11 RX ADMIN — RANOLAZINE 1000 MG: 500 TABLET, FILM COATED, EXTENDED RELEASE ORAL at 21:43

## 2019-10-11 RX ADMIN — ASPIRIN 81 MG 81 MG: 81 TABLET ORAL at 08:24

## 2019-10-11 RX ADMIN — Medication 10 ML: at 21:45

## 2019-10-11 RX ADMIN — FUROSEMIDE 80 MG: 40 TABLET ORAL at 16:30

## 2019-10-11 RX ADMIN — ATORVASTATIN CALCIUM 80 MG: 80 TABLET, FILM COATED ORAL at 21:44

## 2019-10-11 RX ADMIN — TETRAKIS(2-METHOXYISOBUTYLISOCYANIDE)COPPER(I) TETRAFLUOROBORATE 30 MILLICURIE: 1 INJECTION, POWDER, LYOPHILIZED, FOR SOLUTION INTRAVENOUS at 16:07

## 2019-10-11 RX ADMIN — Medication 10 ML: at 09:00

## 2019-10-11 RX ADMIN — NITROGLYCERIN 0.5 INCH: 20 OINTMENT TOPICAL at 05:42

## 2019-10-11 RX ADMIN — REGADENOSON 0.4 MG: 0.08 INJECTION, SOLUTION INTRAVENOUS at 15:30

## 2019-10-11 ASSESSMENT — ENCOUNTER SYMPTOMS
STRIDOR: 0
SORE THROAT: 0
ABDOMINAL PAIN: 0
COUGH: 0
SHORTNESS OF BREATH: 0
NAUSEA: 0
BACK PAIN: 0
VOMITING: 0
SHORTNESS OF BREATH: 1

## 2019-10-11 ASSESSMENT — PAIN SCALES - GENERAL
PAINLEVEL_OUTOF10: 0
PAINLEVEL_OUTOF10: 0

## 2019-10-12 VITALS
OXYGEN SATURATION: 95 % | DIASTOLIC BLOOD PRESSURE: 70 MMHG | TEMPERATURE: 97.2 F | RESPIRATION RATE: 16 BRPM | SYSTOLIC BLOOD PRESSURE: 98 MMHG | WEIGHT: 223.2 LBS | HEART RATE: 60 BPM | BODY MASS INDEX: 30.23 KG/M2 | HEIGHT: 72 IN

## 2019-10-12 PROCEDURE — 93459 L HRT ART/GRFT ANGIO: CPT | Performed by: INTERNAL MEDICINE

## 2019-10-12 PROCEDURE — B2181ZZ FLUOROSCOPY OF LEFT INTERNAL MAMMARY BYPASS GRAFT USING LOW OSMOLAR CONTRAST: ICD-10-PCS | Performed by: INTERNAL MEDICINE

## 2019-10-12 PROCEDURE — 99152 MOD SED SAME PHYS/QHP 5/>YRS: CPT | Performed by: INTERNAL MEDICINE

## 2019-10-12 PROCEDURE — 99024 POSTOP FOLLOW-UP VISIT: CPT | Performed by: INTERNAL MEDICINE

## 2019-10-12 PROCEDURE — 6370000000 HC RX 637 (ALT 250 FOR IP): Performed by: INTERNAL MEDICINE

## 2019-10-12 PROCEDURE — 6360000004 HC RX CONTRAST MEDICATION: Performed by: INTERNAL MEDICINE

## 2019-10-12 PROCEDURE — 2580000003 HC RX 258: Performed by: INTERNAL MEDICINE

## 2019-10-12 PROCEDURE — C1894 INTRO/SHEATH, NON-LASER: HCPCS

## 2019-10-12 PROCEDURE — 4A023N7 MEASUREMENT OF CARDIAC SAMPLING AND PRESSURE, LEFT HEART, PERCUTANEOUS APPROACH: ICD-10-PCS | Performed by: INTERNAL MEDICINE

## 2019-10-12 PROCEDURE — C1760 CLOSURE DEV, VASC: HCPCS

## 2019-10-12 PROCEDURE — B2151ZZ FLUOROSCOPY OF LEFT HEART USING LOW OSMOLAR CONTRAST: ICD-10-PCS | Performed by: INTERNAL MEDICINE

## 2019-10-12 PROCEDURE — 6370000000 HC RX 637 (ALT 250 FOR IP): Performed by: NURSE PRACTITIONER

## 2019-10-12 PROCEDURE — 2709999900 HC NON-CHARGEABLE SUPPLY

## 2019-10-12 PROCEDURE — 6360000002 HC RX W HCPCS

## 2019-10-12 PROCEDURE — 2140000000 HC CCU INTERMEDIATE R&B

## 2019-10-12 PROCEDURE — B2111ZZ FLUOROSCOPY OF MULTIPLE CORONARY ARTERIES USING LOW OSMOLAR CONTRAST: ICD-10-PCS | Performed by: INTERNAL MEDICINE

## 2019-10-12 PROCEDURE — B2131ZZ FLUOROSCOPY OF MULTIPLE CORONARY ARTERY BYPASS GRAFTS USING LOW OSMOLAR CONTRAST: ICD-10-PCS | Performed by: INTERNAL MEDICINE

## 2019-10-12 PROCEDURE — B41F1ZZ FLUOROSCOPY OF RIGHT LOWER EXTREMITY ARTERIES USING LOW OSMOLAR CONTRAST: ICD-10-PCS | Performed by: INTERNAL MEDICINE

## 2019-10-12 RX ORDER — LISINOPRIL 5 MG/1
2.5 TABLET ORAL DAILY
Status: DISCONTINUED | OUTPATIENT
Start: 2019-10-12 | End: 2019-10-12 | Stop reason: HOSPADM

## 2019-10-12 RX ORDER — SODIUM CHLORIDE 0.9 % (FLUSH) 0.9 %
10 SYRINGE (ML) INJECTION PRN
Status: DISCONTINUED | OUTPATIENT
Start: 2019-10-12 | End: 2019-10-12 | Stop reason: HOSPADM

## 2019-10-12 RX ORDER — SODIUM CHLORIDE 0.9 % (FLUSH) 0.9 %
10 SYRINGE (ML) INJECTION EVERY 12 HOURS SCHEDULED
Status: DISCONTINUED | OUTPATIENT
Start: 2019-10-12 | End: 2019-10-12 | Stop reason: HOSPADM

## 2019-10-12 RX ORDER — SODIUM CHLORIDE 0.9 % (FLUSH) 0.9 %
10 SYRINGE (ML) INJECTION PRN
Status: DISCONTINUED | OUTPATIENT
Start: 2019-10-12 | End: 2019-10-12 | Stop reason: SDUPTHER

## 2019-10-12 RX ORDER — ISOSORBIDE MONONITRATE 60 MG/1
60 TABLET, EXTENDED RELEASE ORAL DAILY
Status: DISCONTINUED | OUTPATIENT
Start: 2019-10-12 | End: 2019-10-12 | Stop reason: HOSPADM

## 2019-10-12 RX ORDER — LISINOPRIL 2.5 MG/1
2.5 TABLET ORAL DAILY
Qty: 30 TABLET | Refills: 3 | Status: SHIPPED | OUTPATIENT
Start: 2019-10-12

## 2019-10-12 RX ORDER — SODIUM CHLORIDE 0.9 % (FLUSH) 0.9 %
10 SYRINGE (ML) INJECTION EVERY 12 HOURS SCHEDULED
Status: DISCONTINUED | OUTPATIENT
Start: 2019-10-12 | End: 2019-10-12 | Stop reason: SDUPTHER

## 2019-10-12 RX ADMIN — LISINOPRIL 2.5 MG: 5 TABLET ORAL at 16:55

## 2019-10-12 RX ADMIN — METOPROLOL TARTRATE 25 MG: 25 TABLET ORAL at 09:10

## 2019-10-12 RX ADMIN — RANOLAZINE 1000 MG: 500 TABLET, FILM COATED, EXTENDED RELEASE ORAL at 09:10

## 2019-10-12 RX ADMIN — ISOSORBIDE MONONITRATE 60 MG: 60 TABLET, EXTENDED RELEASE ORAL at 16:54

## 2019-10-12 RX ADMIN — IOPAMIDOL 246 ML: 612 INJECTION, SOLUTION INTRAVENOUS at 12:10

## 2019-10-12 RX ADMIN — FUROSEMIDE 80 MG: 40 TABLET ORAL at 09:10

## 2019-10-12 RX ADMIN — POTASSIUM CHLORIDE 10 MEQ: 750 TABLET, EXTENDED RELEASE ORAL at 09:10

## 2019-10-12 RX ADMIN — Medication 10 ML: at 09:11

## 2019-10-12 ASSESSMENT — PAIN SCALES - GENERAL: PAINLEVEL_OUTOF10: 0

## 2019-10-13 LAB
EKG P AXIS: NORMAL DEGREES
EKG P-R INTERVAL: NORMAL MS
EKG Q-T INTERVAL: 418 MS
EKG QRS DURATION: 96 MS
EKG QTC CALCULATION (BAZETT): 423 MS
EKG T AXIS: 29 DEGREES

## 2019-10-13 PROCEDURE — 93010 ELECTROCARDIOGRAM REPORT: CPT | Performed by: INTERNAL MEDICINE

## 2019-10-14 LAB
LV EF: 35 %
LV EF: 51 %
LVEF MODALITY: NORMAL
LVEF MODALITY: NORMAL

## 2019-10-29 ENCOUNTER — OFFICE VISIT (OUTPATIENT)
Dept: OTOLARYNGOLOGY | Age: 69
End: 2019-10-29
Payer: MEDICARE

## 2019-10-29 VITALS
BODY MASS INDEX: 30.88 KG/M2 | WEIGHT: 228 LBS | SYSTOLIC BLOOD PRESSURE: 110 MMHG | HEART RATE: 63 BPM | TEMPERATURE: 97.8 F | DIASTOLIC BLOOD PRESSURE: 70 MMHG | HEIGHT: 72 IN

## 2019-10-29 DIAGNOSIS — R13.19 OTHER DYSPHAGIA: ICD-10-CM

## 2019-10-29 PROCEDURE — 99202 OFFICE O/P NEW SF 15 MIN: CPT | Performed by: OTOLARYNGOLOGY

## 2019-10-29 PROCEDURE — 31575 DIAGNOSTIC LARYNGOSCOPY: CPT | Performed by: OTOLARYNGOLOGY

## 2019-11-05 ENCOUNTER — HOSPITAL ENCOUNTER (OUTPATIENT)
Dept: GENERAL RADIOLOGY | Age: 69
Discharge: HOME | End: 2019-11-05
Payer: MEDICARE

## 2019-11-05 DIAGNOSIS — R13.19 OTHER DYSPHAGIA: ICD-10-CM

## 2019-11-05 PROCEDURE — 74220 X-RAY XM ESOPHAGUS 1CNTRST: CPT

## 2019-12-04 ENCOUNTER — TELEPHONE (OUTPATIENT)
Dept: OTOLARYNGOLOGY | Age: 69
End: 2019-12-04

## 2020-02-24 ENCOUNTER — TELEPHONE (OUTPATIENT)
Dept: PRIMARY CARE CLINIC | Age: 70
End: 2020-02-24

## 2020-02-24 NOTE — TELEPHONE ENCOUNTER
Pt's PCP Dr, Dr. Tato Gage, has left and a nurse named Dixon Olivera told him to look into switching over to Dr. Mindy Wilburn because this patient has heart problems. I told him Dr. Mindy Wilburn wasn't taking on new patients right now, but I would ask. Please, let me know so I can advice him to look with .     I did tell him that Dr. Alexandre Martel and Dr. Marysol Jacobo are taking new patients, but the patient said he was told that Dr. Mindy Wilburn really knows about the heart.

## 2020-02-26 NOTE — TELEPHONE ENCOUNTER
Called patient to let him know that Dr Kenrick Hurley isn't taking new patients and suggested Dr. Alexi Maldonado at [address] [facility], [address]. He voiced understanding.

## 2020-03-10 ENCOUNTER — OFFICE VISIT (OUTPATIENT)
Dept: CARDIOLOGY | Age: 70
End: 2020-03-10
Payer: MEDICARE

## 2020-03-10 VITALS
BODY MASS INDEX: 31.69 KG/M2 | WEIGHT: 234 LBS | SYSTOLIC BLOOD PRESSURE: 88 MMHG | HEART RATE: 68 BPM | DIASTOLIC BLOOD PRESSURE: 62 MMHG | HEIGHT: 72 IN

## 2020-03-10 PROCEDURE — 99212 OFFICE O/P EST SF 10 MIN: CPT | Performed by: INTERNAL MEDICINE

## 2020-03-10 RX ORDER — ISOSORBIDE MONONITRATE 60 MG/1
60 TABLET, EXTENDED RELEASE ORAL 2 TIMES DAILY
Qty: 15 TABLET | Refills: 1 | Status: SHIPPED | OUTPATIENT
Start: 2020-03-10 | End: 2020-08-13

## 2020-03-10 NOTE — PROGRESS NOTES
continue and manage prescribed medications and monitor the course of the therapy. NA Continue current medications:       Yes         Discussed with patient. Follow Up Visit Scheduled for:  6 month(s)    I greatly appreciate the opportunity to meet Damaso Ferreira and your confidence in allowing me to participate in his cardiovascular care. [facility] [address] [place] [address], Radha Ornelas [place] demetra scribing for and in the presence of GLADIS Fitch MD,FACC. Radha Ornelas MA    3/10/20 11:33 AM  IGLADIS MD, [facility], personally performed the services described in this documentation as scribed by Radha Ornelas in my presence, and it is both accurate and complete. Electronically signed by George Greenfield.  Laxmi Fitch MD, [facility]    3/10/20 11:39 AM

## 2020-03-19 ENCOUNTER — OFFICE VISIT (OUTPATIENT)
Dept: FAMILY MEDICINE CLINIC | Age: 70
End: 2020-03-19
Payer: MEDICARE

## 2020-03-19 VITALS
HEART RATE: 68 BPM | WEIGHT: 235.2 LBS | BODY MASS INDEX: 31.86 KG/M2 | TEMPERATURE: 98.6 F | DIASTOLIC BLOOD PRESSURE: 72 MMHG | SYSTOLIC BLOOD PRESSURE: 120 MMHG | HEIGHT: 72 IN | OXYGEN SATURATION: 98 %

## 2020-03-19 PROBLEM — J41.0 SIMPLE CHRONIC BRONCHITIS (HCC): Status: ACTIVE | Noted: 2019-06-17

## 2020-03-19 PROBLEM — E11.9 TYPE 2 DIABETES MELLITUS WITHOUT COMPLICATION, WITHOUT LONG-TERM CURRENT USE OF INSULIN (HCC): Status: ACTIVE | Noted: 2020-03-19

## 2020-03-19 PROBLEM — E66.811 OBESITY (BMI 30.0-34.9): Status: ACTIVE | Noted: 2020-03-19

## 2020-03-19 PROBLEM — E66.9 OBESITY (BMI 30.0-34.9): Status: ACTIVE | Noted: 2020-03-19

## 2020-03-19 PROBLEM — R07.89 CHEST DISCOMFORT: Status: RESOLVED | Noted: 2019-01-24 | Resolved: 2020-03-19

## 2020-03-19 PROBLEM — I24.9 ACS (ACUTE CORONARY SYNDROME) (HCC): Status: RESOLVED | Noted: 2018-12-26 | Resolved: 2020-03-19

## 2020-03-19 PROBLEM — I20.0 UNSTABLE ANGINA (HCC): Status: RESOLVED | Noted: 2019-01-24 | Resolved: 2020-03-19

## 2020-03-19 PROBLEM — R94.39 ABNORMAL STRESS TEST: Status: RESOLVED | Noted: 2018-07-10 | Resolved: 2020-03-19

## 2020-03-19 PROBLEM — R13.19 OTHER DYSPHAGIA: Status: RESOLVED | Noted: 2019-10-29 | Resolved: 2020-03-19

## 2020-03-19 PROCEDURE — 99204 OFFICE O/P NEW MOD 45 MIN: CPT | Performed by: FAMILY MEDICINE

## 2020-03-19 ASSESSMENT — PATIENT HEALTH QUESTIONNAIRE - PHQ9
1. LITTLE INTEREST OR PLEASURE IN DOING THINGS: 0
SUM OF ALL RESPONSES TO PHQ QUESTIONS 1-9: 0
SUM OF ALL RESPONSES TO PHQ QUESTIONS 1-9: 0
2. FEELING DOWN, DEPRESSED OR HOPELESS: 0
SUM OF ALL RESPONSES TO PHQ9 QUESTIONS 1 & 2: 0

## 2020-03-19 ASSESSMENT — ENCOUNTER SYMPTOMS
FACIAL SWELLING: 0
BACK PAIN: 0
COLOR CHANGE: 0
APNEA: 0
EYE ITCHING: 0
STRIDOR: 0
VOMITING: 0
NAUSEA: 0
EYE DISCHARGE: 0

## 2020-03-19 NOTE — PROGRESS NOTES
isosorbide mononitrate (IMDUR) 60 MG extended release tablet Take 1 tablet by mouth 2 times daily 15 tablet 1    lisinopril (PRINIVIL;ZESTRIL) 2.5 MG tablet Take 1 tablet by mouth daily 30 tablet 3    ranolazine (RANEXA) 1000 MG extended release tablet Take 1 tablet by mouth 2 times daily 16 tablet 0    metoprolol tartrate (LOPRESSOR) 25 MG tablet Take 1 tablet by mouth 2 times daily 60 tablet 0    nitroGLYCERIN (NITROSTAT) 0.4 MG SL tablet up to max of 3 total doses. If no relief after 1 dose, call 911. 25 tablet 5    potassium chloride (KLOR-CON) 20 MEQ packet Take 1/2 tablet daily      atorvastatin (LIPITOR) 80 MG tablet Take 1 tablet by mouth nightly 90 tablet 3    ELIQUIS 5 MG TABS tablet TAKE 1 TABLET BY MOUTH 2 TIMES DAILY  3    furosemide (LASIX) 80 MG tablet Take 1 tablet by mouth daily 90 tablet 3    aspirin 81 MG tablet Take 81 mg by mouth daily      Cholecalciferol (VITAMIN D) 2000 UNITS CAPS capsule Take 1/2 tablet daily       No current facility-administered medications for this visit. Allergies   Allergen Reactions    Amiodarone Rash    Azithromycin     Pcn [Penicillins]     Penicillins Other (See Comments)     Don't know reaction, was a child. But grandparents(who raised him) said to not take       Review of Systems   Constitutional: Negative for chills and fever. HENT: Negative for facial swelling and mouth sores. Eyes: Negative for discharge and itching. Respiratory: Negative for apnea and stridor. Cardiovascular: Negative for chest pain and palpitations. Gastrointestinal: Negative for nausea and vomiting. Endocrine: Negative for cold intolerance and heat intolerance. Genitourinary: Negative for frequency and urgency. Musculoskeletal: Negative for arthralgias and back pain. Skin: Negative for color change and rash. See HPI for visit specific review of symptoms.   All others negative      Objective:   /72   Pulse 68   Temp 98.6 °F (37 °C)   Ht 6' (1.829 m)   Wt 235 lb 3.2 oz (106.7 kg)   SpO2 98%   BMI 31.90 kg/m²   Physical Exam  Vitals signs reviewed. Constitutional:       Appearance: He is well-developed. He is obese. HENT:      Head: Normocephalic. Mouth/Throat:      Lips: Pink. Mouth: Mucous membranes are moist.      Dentition: Has dentures (states he needs to see a dentist). Eyes:      Conjunctiva/sclera: Conjunctivae normal.      Pupils: Pupils are equal, round, and reactive to light. Neck:      Musculoskeletal: Normal range of motion and neck supple. Cardiovascular:      Rate and Rhythm: Normal rate and regular rhythm. Chest Wall: No thrill. Heart sounds: Normal heart sounds. Pulmonary:      Effort: Pulmonary effort is normal. No respiratory distress. Breath sounds: Normal breath sounds. Abdominal:      General: Bowel sounds are normal. There is no distension. Palpations: Abdomen is soft. There is no hepatomegaly. Tenderness: There is no abdominal tenderness. Musculoskeletal: Normal range of motion. Skin:     General: Skin is warm and dry. Capillary Refill: Capillary refill takes less than 2 seconds. Neurological:      Mental Status: He is alert and oriented to person, place, and time. Cranial Nerves: No cranial nerve deficit. Psychiatric:         Behavior: Behavior normal.           Lab Review   No results found for this or any previous visit (from the past 672 hour(s)). Assessment & Plan: The following diagnoses and conditions are stable with no further orders unless indicated:    Patient Active Problem List    Diagnosis Date Noted    Type 2 diabetes mellitus without complication, without long-term current use of insulin [facility] 03/19/2020     Overview Note:     Diet-controlled per patient. Recheck hemoglobin A1c and other labs.       Obesity (BMI 30.0-34.9) 03/19/2020     Overview Note:     Recommended at least 150 minutes of exercise per week and resistance Vaccine  Completed    Hepatitis A vaccine  Aged Out    Hib vaccine  Aged Out    Meningococcal (ACWY) vaccine  Aged Out    Hepatitis C screen  Discontinued     Orders as above. Patient agreeable for Cologuard. He states he had a AAA screen at the [place] in [place] in 2014, that was normal.  Advised to obtain records if possible. We will also obtain records from Dr. Arvin Cordero office. Return in about 3 months (around 6/19/2020) for T2DM. Discussed use, benefit, and side effects of prescribed medications. All patient questions answered. Pt voiced understanding. Reviewed health maintenance. Instructed to continue current medications, diet and exercise. Patient agreedwith treatment plan. Follow up as directed. Old records reviewed, where available.     Won Sims MD    Note:  dictated using Dragon software

## 2020-03-20 DIAGNOSIS — E11.9 TYPE 2 DIABETES MELLITUS WITHOUT COMPLICATION, WITHOUT LONG-TERM CURRENT USE OF INSULIN (HCC): ICD-10-CM

## 2020-03-20 LAB
ALBUMIN SERPL-MCNC: 4.3 G/DL (ref 3.5–5.2)
ALP BLD-CCNC: 52 U/L (ref 40–130)
ALT SERPL-CCNC: 7 U/L (ref 5–41)
ANION GAP SERPL CALCULATED.3IONS-SCNC: 11 MMOL/L (ref 7–19)
AST SERPL-CCNC: 13 U/L (ref 5–40)
BASOPHILS ABSOLUTE: 0.1 K/UL (ref 0–0.2)
BASOPHILS RELATIVE PERCENT: 0.8 % (ref 0–1)
BILIRUB SERPL-MCNC: 0.8 MG/DL (ref 0.2–1.2)
BUN BLDV-MCNC: 18 MG/DL (ref 8–23)
CALCIUM SERPL-MCNC: 9.5 MG/DL (ref 8.8–10.2)
CHLORIDE BLD-SCNC: 99 MMOL/L (ref 98–111)
CHOLESTEROL, TOTAL: 126 MG/DL (ref 160–199)
CO2: 26 MMOL/L (ref 22–29)
CREAT SERPL-MCNC: 0.9 MG/DL (ref 0.5–1.2)
CREATININE URINE: 48.2 MG/DL (ref 4.2–622)
EOSINOPHILS ABSOLUTE: 0.2 K/UL (ref 0–0.6)
EOSINOPHILS RELATIVE PERCENT: 2.1 % (ref 0–5)
GFR NON-AFRICAN AMERICAN: >60
GLUCOSE BLD-MCNC: 152 MG/DL (ref 74–109)
HBA1C MFR BLD: 6.4 % (ref 4–6)
HCT VFR BLD CALC: 41.7 % (ref 42–52)
HDLC SERPL-MCNC: 47 MG/DL (ref 55–121)
HEMOGLOBIN: 13.7 G/DL (ref 14–18)
IMMATURE GRANULOCYTES #: 0.1 K/UL
LDL CHOLESTEROL CALCULATED: 61 MG/DL
LYMPHOCYTES ABSOLUTE: 2.2 K/UL (ref 1.1–4.5)
LYMPHOCYTES RELATIVE PERCENT: 28.8 % (ref 20–40)
MCH RBC QN AUTO: 32.2 PG (ref 27–31)
MCHC RBC AUTO-ENTMCNC: 32.9 G/DL (ref 33–37)
MCV RBC AUTO: 97.9 FL (ref 80–94)
MICROALBUMIN UR-MCNC: <1.2 MG/DL (ref 0–19)
MICROALBUMIN/CREAT UR-RTO: NORMAL MG/G
MONOCYTES ABSOLUTE: 0.9 K/UL (ref 0–0.9)
MONOCYTES RELATIVE PERCENT: 11.6 % (ref 0–10)
NEUTROPHILS ABSOLUTE: 4.2 K/UL (ref 1.5–7.5)
NEUTROPHILS RELATIVE PERCENT: 56 % (ref 50–65)
PDW BLD-RTO: 12.3 % (ref 11.5–14.5)
PLATELET # BLD: 289 K/UL (ref 130–400)
PMV BLD AUTO: 10.6 FL (ref 9.4–12.4)
POTASSIUM SERPL-SCNC: 4.6 MMOL/L (ref 3.5–5)
RBC # BLD: 4.26 M/UL (ref 4.7–6.1)
SODIUM BLD-SCNC: 136 MMOL/L (ref 136–145)
TOTAL PROTEIN: 7.2 G/DL (ref 6.6–8.7)
TRIGL SERPL-MCNC: 91 MG/DL (ref 0–149)
WBC # BLD: 7.6 K/UL (ref 4.8–10.8)

## 2020-04-21 ENCOUNTER — NURSE TRIAGE (OUTPATIENT)
Dept: OTHER | Facility: CLINIC | Age: 70
End: 2020-04-21

## 2020-04-21 NOTE — TELEPHONE ENCOUNTER
Reason for Disposition   Second attempt to contact family and no contact made. Phone number verified. Protocols used: NO CONTACT OR DUPLICATE CONTACT CALL-ADULT-[place]    Attempted alternate number to contact patient, it is not in service.

## 2020-04-29 ENCOUNTER — HOSPITAL ENCOUNTER (EMERGENCY)
Age: 70
Discharge: HOME | End: 2020-04-29
Attending: EMERGENCY MEDICINE
Payer: MEDICARE

## 2020-04-29 VITALS
SYSTOLIC BLOOD PRESSURE: 117 MMHG | BODY MASS INDEX: 31.56 KG/M2 | DIASTOLIC BLOOD PRESSURE: 71 MMHG | WEIGHT: 233 LBS | HEIGHT: 72 IN | TEMPERATURE: 98.3 F | HEART RATE: 68 BPM | OXYGEN SATURATION: 95 % | RESPIRATION RATE: 20 BRPM

## 2020-04-29 PROCEDURE — 96374 THER/PROPH/DIAG INJ IV PUSH: CPT

## 2020-04-29 PROCEDURE — 6360000002 HC RX W HCPCS

## 2020-04-29 PROCEDURE — 99283 EMERGENCY DEPT VISIT LOW MDM: CPT

## 2020-04-29 RX ORDER — DIPHENHYDRAMINE HYDROCHLORIDE 50 MG/ML
INJECTION INTRAMUSCULAR; INTRAVENOUS
Status: COMPLETED
Start: 2020-04-29 | End: 2020-04-29

## 2020-04-29 RX ORDER — DIPHENHYDRAMINE HCL 25 MG
25 TABLET ORAL EVERY 6 HOURS PRN
Qty: 30 TABLET | Refills: 0 | Status: SHIPPED | OUTPATIENT
Start: 2020-04-29 | End: 2020-05-19 | Stop reason: CLARIF

## 2020-04-29 RX ORDER — DIPHENHYDRAMINE HYDROCHLORIDE 50 MG/ML
50 INJECTION INTRAMUSCULAR; INTRAVENOUS ONCE
Status: COMPLETED | OUTPATIENT
Start: 2020-04-29 | End: 2020-04-29

## 2020-04-29 RX ADMIN — DIPHENHYDRAMINE HYDROCHLORIDE 50 MG: 50 INJECTION, SOLUTION INTRAMUSCULAR; INTRAVENOUS at 01:37

## 2020-04-29 RX ADMIN — DIPHENHYDRAMINE HYDROCHLORIDE 50 MG: 50 INJECTION INTRAMUSCULAR; INTRAVENOUS at 01:37

## 2020-04-29 ASSESSMENT — ENCOUNTER SYMPTOMS
VOMITING: 0
EYE ITCHING: 1
CONSTIPATION: 0
SHORTNESS OF BREATH: 0
PHOTOPHOBIA: 0
DIARRHEA: 0
BACK PAIN: 0
NAUSEA: 0
EYE PAIN: 0
ABDOMINAL PAIN: 0
TROUBLE SWALLOWING: 0
SINUS PRESSURE: 0
CHEST TIGHTNESS: 0
WHEEZING: 0
COLOR CHANGE: 0

## 2020-04-29 NOTE — ED NOTES
Patient states that he is feeling better. Pt states that he the itching is better.       Sandy Flores RN  04/29/20 9961

## 2020-04-29 NOTE — ED PROVIDER NOTES
Physically abused: None     Forced sexual activity: None   Other Topics Concern    None   Social History Narrative    ** Merged History Encounter **            SCREENINGS    Elizabeth Coma Scale  Eye Opening: Spontaneous  Best Verbal Response: Oriented  Best Motor Response: Obeys commands  Starks Coma Scale Score: 15        PHYSICAL EXAM    (up to 7 for level 4, 8 or more for level 5)     ED Triage Vitals [04/29/20 0116]   BP Temp Temp src Pulse Resp SpO2 Height Weight   (!) 150/85 98.3 °F (36.8 °C) -- 63 18 98 % 6' (1.829 m) 233 lb (105.7 kg)       Physical Exam  Vitals signs and nursing note reviewed. Constitutional:       General: He is not in acute distress. Appearance: Normal appearance. He is well-developed. He is not ill-appearing. HENT:      Head: Normocephalic and atraumatic. Nose: Nose normal.      Mouth/Throat:      Mouth: Mucous membranes are moist.      Pharynx: Oropharynx is clear. Eyes:      Conjunctiva/sclera: Conjunctivae normal.      Pupils: Pupils are equal, round, and reactive to light. Neck:      Musculoskeletal: Normal range of motion and neck supple. Cardiovascular:      Rate and Rhythm: Normal rate and regular rhythm. Heart sounds: Normal heart sounds. No murmur. Pulmonary:      Effort: Pulmonary effort is normal.      Breath sounds: Normal breath sounds. No wheezing or rales. Abdominal:      General: Bowel sounds are normal. There is no distension. Palpations: Abdomen is soft. Tenderness: There is no abdominal tenderness. There is no guarding or rebound. Musculoskeletal: Normal range of motion. Skin:     General: Skin is warm and dry. Capillary Refill: Capillary refill takes less than 2 seconds. Findings: Rash present. Rash is urticarial.             Comments: Patient has a small urticarial rash to his left upper chest wall. Neurological:      General: No focal deficit present.       Mental Status: He is alert and oriented to person, DISPOSITION Decision To Discharge 04/29/2020 02:29:19 AM      PATIENT REFERRED TO:  Josefina Yarbrough MD  [address] [place] [address], [address] [place] [address] [phone]    Call in 2 days  For follow up, As needed      DISCHARGE MEDICATIONS:  New Prescriptions    DIPHENHYDRAMINE (BENADRYL) 25 MG TABLET    Take 1 tablet by mouth every 6 hours as needed for Itching or Allergies          (Please note that portions of this note were completed with a voice recognition program.  Efforts were made to edit thedictations but occasionally words are mis-transcribed.)    Gage Olivares MD (electronically signed)  Attending Emergency Physician          Gordon Haque MD  04/29/20 [id number]

## 2020-04-30 ENCOUNTER — CARE COORDINATION (OUTPATIENT)
Dept: CARE COORDINATION | Age: 70
End: 2020-04-30

## 2020-05-01 ENCOUNTER — VIRTUAL VISIT (OUTPATIENT)
Dept: FAMILY MEDICINE CLINIC | Age: 70
End: 2020-05-01
Payer: MEDICARE

## 2020-05-01 PROCEDURE — 99441 PR PHYS/QHP TELEPHONE EVALUATION 5-10 MIN: CPT | Performed by: FAMILY MEDICINE

## 2020-05-14 ENCOUNTER — CARE COORDINATION (OUTPATIENT)
Dept: CARE COORDINATION | Age: 70
End: 2020-05-14

## 2020-05-19 ENCOUNTER — OFFICE VISIT (OUTPATIENT)
Dept: GASTROENTEROLOGY | Age: 70
End: 2020-05-19
Payer: MEDICARE

## 2020-05-19 VITALS
BODY MASS INDEX: 31.89 KG/M2 | WEIGHT: 235.4 LBS | HEIGHT: 72 IN | HEART RATE: 61 BPM | DIASTOLIC BLOOD PRESSURE: 70 MMHG | SYSTOLIC BLOOD PRESSURE: 110 MMHG | OXYGEN SATURATION: 97 %

## 2020-05-19 PROCEDURE — 99204 OFFICE O/P NEW MOD 45 MIN: CPT | Performed by: NURSE PRACTITIONER

## 2020-05-19 NOTE — PATIENT INSTRUCTIONS
Please follow all instructions as provided for cleansing the bowel. Failure to have an adequately prepped colon may cause you to have incomplete exam with further testing required.      [email or web address]

## 2020-05-19 NOTE — PROGRESS NOTES
continue. We also discussed the need for anesthesia, IV access, proper dietary changes, medication changes if necessary, and need for bowel prep (if ordered) prior to their Endoscopic procedure. They are aware they must have someone accompany them to their scheduled procedure to drive them home - they agree to the above and are willing to continue. Plan for anesthesia: MAC  Co-morbidities noted increasing risks with sedation and procedure: CAD, a-fib/flutter, diabetes, htn, hyperlipidemia, history stroke and MI. Patient on eliquis. Clearance to hold required. Followed by cardiologist, Dr. Cele Avery. Last ov in March, noted as stable cardiac condition, sinus rhythm. Family History   Problem Relation Age of Onset    Stroke Father     Heart Disease Father     High Blood Pressure Father     High Cholesterol Father     Other Father         \"swelling on vein behind stomach\"    Heart Disease Other     Colon Cancer Neg Hx     Colon Polyps Neg Hx     Esophageal Cancer Neg Hx     Liver Cancer Neg Hx     Liver Disease Neg Hx     Rectal Cancer Neg Hx     Stomach Cancer Neg Hx        Past Medical History:   Diagnosis Date    Atrial fibrillation (HCC)     [facility]     CAD (coronary artery disease)     CAD (coronary atherosclerotic disease)     Diabetes mellitus ([facility]     Ex-smoker     quit 2013 /smoked 50 yrs    History of amiodarone therapy     [facility]     [facility]      Hypertension     Hypokalemia     Hypotension     MI (myocardial infarction) ([facility]     S/P CABG x 4 11/11/2013 6/27/13    Stroke ([facility] 08/22/2017    eye       Past Surgical History:   Procedure Laterality Date    ABLATION OF DYSRHYTHMIC FOCUS      CARDIAC CATHETERIZATION  6/27/13  MDL    EF 45%    CARDIAC SURGERY      CABG x 4    CATARACT REMOVAL WITH IMPLANT Bilateral     CORONARY ARTERY BYPASS GRAFT  6/27/2013     Emergency CABG x 4, LIMA-DIAG, SVG-LAD, SVG-OM,

## 2020-06-03 ENCOUNTER — TELEPHONE (OUTPATIENT)
Dept: CARDIOLOGY | Age: 70
End: 2020-06-03

## 2020-06-03 NOTE — TELEPHONE ENCOUNTER
Ok to hold Eliquis 2 days prior to scope.   Thanks, [employer] [facility]

## 2020-06-09 ENCOUNTER — TELEPHONE (OUTPATIENT)
Dept: GASTROENTEROLOGY | Age: 70
End: 2020-06-09

## 2020-06-10 ENCOUNTER — OFFICE VISIT (OUTPATIENT)
Dept: PULMONOLOGY | Facility: CLINIC | Age: 70
End: 2020-06-10

## 2020-06-10 VITALS
DIASTOLIC BLOOD PRESSURE: 72 MMHG | OXYGEN SATURATION: 98 % | HEART RATE: 72 BPM | BODY MASS INDEX: 31.15 KG/M2 | HEIGHT: 72 IN | WEIGHT: 230 LBS | SYSTOLIC BLOOD PRESSURE: 124 MMHG | TEMPERATURE: 98.1 F

## 2020-06-10 DIAGNOSIS — E66.3 OVERWEIGHT: ICD-10-CM

## 2020-06-10 DIAGNOSIS — J41.0 SIMPLE CHRONIC BRONCHITIS (HCC): Primary | ICD-10-CM

## 2020-06-10 DIAGNOSIS — Z87.891 PERSONAL HISTORY OF NICOTINE DEPENDENCE: ICD-10-CM

## 2020-06-10 DIAGNOSIS — I48.0 PAROXYSMAL ATRIAL FIBRILLATION (HCC): ICD-10-CM

## 2020-06-10 DIAGNOSIS — R05.9 COUGH: ICD-10-CM

## 2020-06-10 PROCEDURE — 99214 OFFICE O/P EST MOD 30 MIN: CPT | Performed by: INTERNAL MEDICINE

## 2020-06-10 RX ORDER — POTASSIUM CHLORIDE 20 MEQ/1
TABLET, EXTENDED RELEASE ORAL DAILY
COMMUNITY

## 2020-06-10 RX ORDER — LISINOPRIL 2.5 MG/1
2.5 TABLET ORAL
COMMUNITY
Start: 2019-10-12

## 2020-06-10 NOTE — PROGRESS NOTES
Dulce Ramey is a 69 y.o. male.     Chief Complaint   Patient presents with   • Simple chronic bronchitis      No My Sticky Note on file.    History of Present Illness   This visit was done as a face-to-face visit with both the patient myself wearing a mask and appropriate hand hygiene measures being observed.  The patient still has problems with cough periodically.  I did advise him if he had a chronic dry cough that his ACE inhibitor therapy in the form of lisinopril could be contributing to this.  However his cough is frequently productive and tends to be intermittently worse particularly has any coexisting sinus congestion.  Otherwise he is done well since his last visit.  He would be a candidate for a follow-up screening chest CT and will schedule at [facility] for sometime in early July which would be just over a year from his last CT which showed no suspicious lesions.  He does have a history of paroxysmal atrial fibrillation.  He has more recently been in sinus rhythm.    Medical/Family/Social History   has a past medical history of Atrial fibrillation (CMS/HCC), COPD (chronic obstructive pulmonary disease) (CMS/HCC), Diabetes mellitus (CMS/HCC), Heart disease, Hypertension, and Neoplasm of uncertain behavior of skin.   has a past surgical history that includes Cataract extraction; Other surgical history; and Ablation of dysrhythmic focus.  family history includes Diabetes in his daughter; Heart disease in his father; Hypertension in his father.   reports that he quit smoking about 7 years ago. He has never used smokeless tobacco. He reports that he does not drink alcohol or use drugs.  Allergies   Allergen Reactions   • Amiodarone Rash   • Penicillins      Medications    Current Outpatient Medications:   •  apixaban (ELIQUIS) 5 MG tablet tablet, TAKE 1 TABLET BY MOUTH 2 TIMES DAILY, Disp: , Rfl:   •  aspirin 81 MG tablet, Take 81 mg by mouth daily, Disp: , Rfl:   •  atorvastatin (LIPITOR)  "20 MG tablet, Take 80 mg by mouth., Disp: , Rfl:   •  Cholecalciferol (VITAMIN D) 2000 UNITS capsule, 1/2 tablet once a day, Disp: , Rfl:   •  furosemide (LASIX) 80 MG tablet, Take  by mouth., Disp: , Rfl:   •  isosorbide mononitrate (IMDUR) 60 MG 24 hr tablet, Take 60 mg by mouth 2 (Two) Times a Day., Disp: , Rfl:   •  lisinopril (PRINIVIL,ZESTRIL) 2.5 MG tablet, Take 2.5 mg by mouth., Disp: , Rfl:   •  metoprolol succinate XL (TOPROL-XL) 25 MG 24 hr tablet, Take 25 mg by mouth 2 (two) times a day. Extended Release , Disp: , Rfl:   •  nitroglycerin (NITROSTAT) 0.4 MG SL tablet, up to max of 3 total doses. If no relief after 1 dose, call 911., Disp: , Rfl:   •  Potassium 99 MG tablet, Take  by mouth., Disp: , Rfl:   •  potassium chloride (K-DUR,KLOR-CON) 20 MEQ CR tablet, Take  by mouth Daily., Disp: , Rfl:   •  ranolazine (RANEXA) 1000 MG 12 hr tablet, Take 1,000 mg by mouth 2 (two) times a day., Disp: , Rfl:     Review of Systems   Constitutional: Negative for chills and fever.   HENT: Positive for congestion, postnasal drip and rhinorrhea.    Eyes: Negative for visual disturbance.   Respiratory: Positive for cough. Negative for shortness of breath.    Cardiovascular: Negative for chest pain.   Gastrointestinal: Negative for diarrhea, nausea and vomiting.   Genitourinary: Negative for difficulty urinating.   Musculoskeletal: Negative for arthralgias.   Skin: Negative for rash.   Neurological: Negative for dizziness and speech difficulty.   Psychiatric/Behavioral: The patient is not nervous/anxious.      ------------------------------------  Objective   /72   Pulse 72   Temp 98.1 °F (36.7 °C)   Ht 182.9 cm (72\")   Wt 104 kg (230 lb)   SpO2 98% Comment: RA  BMI 31.19 kg/m²   Physical Exam   Constitutional: He is oriented to person, place, and time. He appears well-developed.   He is a minimally overweight white male.   HENT:   Head: Normocephalic and atraumatic.   He is wearing a mask.   Eyes: Pupils are " equal, round, and reactive to light. EOM are normal.   Neck: Normal range of motion. Neck supple.   Cardiovascular: Normal rate, regular rhythm and normal heart sounds.   He has a history of atrial fibrillation but his rhythm appears regular today.   Pulmonary/Chest: Effort normal and breath sounds normal.   Abdominal: Soft.   Musculoskeletal: Normal range of motion.   Neurological: He is alert and oriented to person, place, and time.   Skin: Skin is warm and dry.   He has a few ecchymoses on his upper extremities.  He is on chronic anticoagulant therapy.   Psychiatric: He has a normal mood and affect.   Nursing note and vitals reviewed.                Assessment/Plan   Eric was seen today for simple chronic bronchitis.    Diagnoses and all orders for this visit:    Simple chronic bronchitis (CMS/HCC)    Cough    Paroxysmal atrial fibrillation (CMS/HCC)    Overweight    Personal history of nicotine dependence  -     CT Chest Low Dose Wo; Future      Patient's Body mass index is 31.19 kg/m². BMI is above normal parameters. Recommendations include: referral to primary care.      I am going to schedule his CT at [facility] sometime in early July and call him with results.  Assuming it shows no new nodules or suspicious lesions we will just plan on a follow-up visit in 1 year and again we can continue yearly CAT scans for screening purposes as long as he meets criteria.  He is in the process of becoming established with Dr. Marrero is his primary care physician I will send a copy today's note to Dr. Marrero.

## 2020-06-12 ENCOUNTER — OFFICE VISIT (OUTPATIENT)
Age: 70
End: 2020-06-12

## 2020-06-12 VITALS — TEMPERATURE: 97.5 F | OXYGEN SATURATION: 97 %

## 2020-06-13 LAB
REPORT: NORMAL
SARS-COV-2: NOT DETECTED
THIS TEST SENT TO: NORMAL

## 2020-06-15 ENCOUNTER — TELEPHONE (OUTPATIENT)
Age: 70
End: 2020-06-15

## 2020-06-16 ENCOUNTER — HOSPITAL ENCOUNTER (OUTPATIENT)
Age: 70
Setting detail: OUTPATIENT SURGERY
Discharge: HOME | End: 2020-06-16
Attending: INTERNAL MEDICINE | Admitting: INTERNAL MEDICINE
Payer: MEDICARE

## 2020-06-16 ENCOUNTER — ANESTHESIA EVENT (OUTPATIENT)
Dept: ENDOSCOPY | Age: 70
End: 2020-06-16
Payer: MEDICARE

## 2020-06-16 ENCOUNTER — ANESTHESIA (OUTPATIENT)
Dept: ENDOSCOPY | Age: 70
End: 2020-06-16
Payer: MEDICARE

## 2020-06-16 VITALS
RESPIRATION RATE: 16 BRPM | DIASTOLIC BLOOD PRESSURE: 46 MMHG | OXYGEN SATURATION: 98 % | SYSTOLIC BLOOD PRESSURE: 82 MMHG

## 2020-06-16 VITALS
HEART RATE: 60 BPM | WEIGHT: 225 LBS | OXYGEN SATURATION: 100 % | SYSTOLIC BLOOD PRESSURE: 124 MMHG | BODY MASS INDEX: 30.48 KG/M2 | HEIGHT: 72 IN | TEMPERATURE: 97.7 F | DIASTOLIC BLOOD PRESSURE: 77 MMHG | RESPIRATION RATE: 18 BRPM

## 2020-06-16 PROCEDURE — 3700000001 HC ADD 15 MINUTES (ANESTHESIA): Performed by: INTERNAL MEDICINE

## 2020-06-16 PROCEDURE — 7100000010 HC PHASE II RECOVERY - FIRST 15 MIN: Performed by: INTERNAL MEDICINE

## 2020-06-16 PROCEDURE — 45380 COLONOSCOPY AND BIOPSY: CPT | Performed by: INTERNAL MEDICINE

## 2020-06-16 PROCEDURE — 6360000002 HC RX W HCPCS

## 2020-06-16 PROCEDURE — 2500000003 HC RX 250 WO HCPCS

## 2020-06-16 PROCEDURE — 2709999900 HC NON-CHARGEABLE SUPPLY: Performed by: INTERNAL MEDICINE

## 2020-06-16 PROCEDURE — 3609010600 HC COLONOSCOPY POLYPECTOMY SNARE/COLD BIOPSY: Performed by: INTERNAL MEDICINE

## 2020-06-16 PROCEDURE — 3700000000 HC ANESTHESIA ATTENDED CARE: Performed by: INTERNAL MEDICINE

## 2020-06-16 PROCEDURE — 7100000011 HC PHASE II RECOVERY - ADDTL 15 MIN: Performed by: INTERNAL MEDICINE

## 2020-06-16 PROCEDURE — 2580000003 HC RX 258: Performed by: INTERNAL MEDICINE

## 2020-06-16 RX ORDER — SODIUM CHLORIDE, SODIUM LACTATE, POTASSIUM CHLORIDE, CALCIUM CHLORIDE 600; 310; 30; 20 MG/100ML; MG/100ML; MG/100ML; MG/100ML
INJECTION, SOLUTION INTRAVENOUS CONTINUOUS
Status: DISCONTINUED | OUTPATIENT
Start: 2020-06-16 | End: 2020-06-16 | Stop reason: HOSPADM

## 2020-06-16 RX ORDER — PROPOFOL 10 MG/ML
INJECTION, EMULSION INTRAVENOUS PRN
Status: DISCONTINUED | OUTPATIENT
Start: 2020-06-16 | End: 2020-06-16 | Stop reason: SDUPTHER

## 2020-06-16 RX ORDER — EPHEDRINE SULFATE 50 MG/ML
INJECTION, SOLUTION INTRAVENOUS PRN
Status: DISCONTINUED | OUTPATIENT
Start: 2020-06-16 | End: 2020-06-16 | Stop reason: SDUPTHER

## 2020-06-16 RX ORDER — LIDOCAINE HYDROCHLORIDE 20 MG/ML
INJECTION, SOLUTION EPIDURAL; INFILTRATION; INTRACAUDAL; PERINEURAL PRN
Status: DISCONTINUED | OUTPATIENT
Start: 2020-06-16 | End: 2020-06-16 | Stop reason: SDUPTHER

## 2020-06-16 RX ADMIN — PROPOFOL 180 MG: 10 INJECTION, EMULSION INTRAVENOUS at 09:48

## 2020-06-16 RX ADMIN — SODIUM CHLORIDE, POTASSIUM CHLORIDE, SODIUM LACTATE AND CALCIUM CHLORIDE: 600; 310; 30; 20 INJECTION, SOLUTION INTRAVENOUS at 08:40

## 2020-06-16 RX ADMIN — EPHEDRINE SULFATE 10 MG: 50 INJECTION INTRAMUSCULAR; INTRAVENOUS; SUBCUTANEOUS at 09:47

## 2020-06-16 RX ADMIN — EPHEDRINE SULFATE 10 MG: 50 INJECTION INTRAMUSCULAR; INTRAVENOUS; SUBCUTANEOUS at 09:38

## 2020-06-16 RX ADMIN — LIDOCAINE HYDROCHLORIDE 2 ML: 20 INJECTION, SOLUTION EPIDURAL; INFILTRATION; INTRACAUDAL; PERINEURAL at 09:30

## 2020-06-16 RX ADMIN — EPHEDRINE SULFATE 10 MG: 50 INJECTION INTRAMUSCULAR; INTRAVENOUS; SUBCUTANEOUS at 09:43

## 2020-06-16 ASSESSMENT — PAIN SCALES - GENERAL
PAINLEVEL_OUTOF10: 0
PAINLEVEL_OUTOF10: 0

## 2020-06-16 ASSESSMENT — PAIN - FUNCTIONAL ASSESSMENT: PAIN_FUNCTIONAL_ASSESSMENT: 0-10

## 2020-06-16 NOTE — ANESTHESIA PRE PROCEDURE
Department of Anesthesiology  Preprocedure Note       Name:  Rogers Desai   Age:  71 y.o.  :  1950                                          MRN:  [id number]         Date:  2020      Surgeon: Bell Bundy):  Lily Adame MD    Procedure: Procedure(s):  COLONOSCOPY DIAGNOSTIC    Medications prior to admission:   Prior to Admission medications    Medication Sig Start Date End Date Taking? Authorizing Provider   isosorbide mononitrate (IMDUR) 60 MG extended release tablet Take 1 tablet by mouth 2 times daily 3/10/20  Yes Carter Mitchell MD   lisinopril (PRINIVIL;ZESTRIL) 2.5 MG tablet Take 1 tablet by mouth daily 10/12/19  Yes Carter Mitchell MD   ranolazine (RANEXA) 1000 MG extended release tablet Take 1 tablet by mouth 2 times daily 6/10/19  Yes BERNARDO Davidson   metoprolol tartrate (LOPRESSOR) 25 MG tablet Take 1 tablet by mouth 2 times daily 19  Yes BERNARDO Wheatley   potassium chloride (KLOR-CON) 20 MEQ packet Take 1/2 tablet daily   Yes Historical Provider, MD   atorvastatin (LIPITOR) 80 MG tablet Take 1 tablet by mouth nightly 18  Yes BERNARDO Davidson   furosemide (LASIX) 80 MG tablet Take 1 tablet by mouth daily 6/15/17  Yes BERNARDO Burnham   Cholecalciferol (VITAMIN D) 2000 UNITS CAPS capsule Take 1/2 tablet daily   Yes Historical Provider, MD   nitroGLYCERIN (NITROSTAT) 0.4 MG SL tablet up to max of 3 total doses. If no relief after 1 dose, call 911. 18   Carter Mitchell MD   ELIQUIS 5 MG TABS tablet TAKE 1 TABLET BY MOUTH 2 TIMES DAILY 10/8/17   Historical Provider, MD   aspirin 81 MG tablet Take 81 mg by mouth daily    Historical Provider, MD       Current medications:    Current Facility-Administered Medications   Medication Dose Route Frequency Provider Last Rate Last Dose    lactated ringers infusion   Intravenous Continuous Lily Adame  mL/hr at 20 0840         Allergies:     Allergies   Allergen Reactions    Amiodarone Rash    Azithromycin     ischemia, EF 51%, 1% ischemic myocardium on stress, low risk findings, AUC indication 15, AUC score 4, (MD Shay)      Neuro/Psych:   (+) CVA:, TIA,             GI/Hepatic/Renal: Neg GI/Hepatic/Renal ROS            Endo/Other:    (+) DiabetesType II DM, , .          Pt had no PAT visit       Abdominal:           Vascular: negative vascular ROS. Anesthesia Plan      general and TIVA     ASA 3       Induction: intravenous. Anesthetic plan and risks discussed with patient. Plan discussed with CRNA.                   BERNARDO Ruff - CRNA   6/16/2020

## 2020-06-16 NOTE — H&P
Historical Provider, MD   nitroGLYCERIN (NITROSTAT) 0.4 MG SL tablet up to max of 3 total doses. If no relief after 1 dose, call 911. 18   Natalie Hilliard MD   ELIQUIS 5 MG TABS tablet TAKE 1 TABLET BY MOUTH 2 TIMES DAILY 10/8/17   Historical Provider, MD   aspirin 81 MG tablet Take 81 mg by mouth daily    Historical Provider, MD       Past Medical History:  Past Medical History:   Diagnosis Date    [facility]     Atrial flutter ([facility]     CAD (coronary artery disease)     CAD (coronary atherosclerotic disease)     Diabetes mellitus ([facility]     diet controlled    Ex-smoker     quit  /smoked 50 yrs    History of amiodarone therapy     [facility]     [facility]      Hypertension     Hypokalemia     Hypotension     [facility]myocardial infarction) [facility]     S/P CABG x 4 2013    [facility]    [facility]       Past Surgical History:  Past Surgical History:   Procedure Laterality Date    ABLATION OF DYSRHYTHMIC FOCUS      CARDIAC CATHETERIZATION  13  MDL    EF 45%    CARDIAC SURGERY      CABG x 4    CATARACT REMOVAL WITH IMPLANT Bilateral     CORONARY ARTERY BYPASS GRAFT  2013     Emergency CABG x 4, LIMA-DIAG, SVG-LAD, SVG-OM, SVG-PDA, RT EVH, DR DOLAN    CYST INCISION AND DRAINAGE N/A     RECTAL    EYE SURGERY      EYE SURGERY      cataract sx and implants (both eyes)    OTHER SURGICAL HISTORY      heart ablation    RETROPHYARYNGEAL ABCESS INCISION/DRAIN      Abcess near rectum       Social History:  Social History     Tobacco Use    Smoking status: Former Smoker     Packs/day: 1.00     Years: 15.00     Pack years: 15.00     Types: Cigarettes     Last attempt to quit: 2013     Years since quittin.9    Smokeless tobacco: Never Used   Substance Use Topics    Alcohol use: No    Drug use: No       Vital Signs:   Vitals:    20 0828   BP: 122/67   Pulse: (!) 43   Resp: 20   Temp: 97.6 °F (36.4 °C)   SpO2: 100%

## 2020-07-09 ENCOUNTER — HOSPITAL ENCOUNTER (OUTPATIENT)
Dept: CT IMAGING | Age: 70
Discharge: HOME | End: 2020-07-09
Payer: MEDICARE

## 2020-07-09 PROCEDURE — 71250 CT THORAX DX C-: CPT

## 2020-07-10 DIAGNOSIS — Z87.891 PERSONAL HISTORY OF NICOTINE DEPENDENCE: ICD-10-CM

## 2020-07-13 ENCOUNTER — TELEPHONE (OUTPATIENT)
Dept: PULMONOLOGY | Facility: CLINIC | Age: 70
End: 2020-07-13

## 2020-07-13 NOTE — TELEPHONE ENCOUNTER
Drew Bonilla MD Powers, Amanda, MA             Call the patient and tell him his CT showed no nodules. We will repeat in one year send me a telephone message on it.    Previous Messages            Result Notes for CT Chest Low Dose Wo     Notes recorded by Hortencia Washington MA on 7/13/2020 at 1:15 PM CDT  Patient notified  ------    Notes recorded by Hortencia Washington MA on 7/10/2020 at 4:00 PM CDT  He called back and left a message. I tried to call him back and he didn't answer  ------

## 2020-07-13 NOTE — TELEPHONE ENCOUNTER
I dont know if you need to make an appointment on this for a year or not?          Drew Bonilla MD Powers, Amanda, MA             Call the patient and tell him his CT showed no nodules. We will repeat in one year send me a telephone message on it.    Previous Messages            Result Notes for CT Chest Low Dose Wo     Notes recorded by Hortencia Washington MA on 7/13/2020 at 1:15 PM CDT  Patient notified  ------    Notes recorded by Hortencia Washington MA on 7/10/2020 at 4:00 PM CDT  He called back and left a message. I tried to call him back and he didn't answer  ------        No

## 2020-08-11 ENCOUNTER — HOSPITAL ENCOUNTER (EMERGENCY)
Age: 70
Discharge: HOME | End: 2020-08-12
Attending: EMERGENCY MEDICINE
Payer: MEDICARE

## 2020-08-11 ENCOUNTER — APPOINTMENT (OUTPATIENT)
Dept: GENERAL RADIOLOGY | Age: 70
End: 2020-08-11
Payer: MEDICARE

## 2020-08-11 LAB
ALBUMIN SERPL-MCNC: 3.9 G/DL (ref 3.5–5.2)
ALP BLD-CCNC: 64 U/L (ref 40–130)
ALT SERPL-CCNC: 8 U/L (ref 5–41)
ANION GAP SERPL CALCULATED.3IONS-SCNC: 12 MMOL/L (ref 7–19)
AST SERPL-CCNC: 14 U/L (ref 5–40)
BASOPHILS ABSOLUTE: 0.1 K/UL (ref 0–0.2)
BASOPHILS RELATIVE PERCENT: 0.6 % (ref 0–1)
BILIRUB SERPL-MCNC: 0.6 MG/DL (ref 0.2–1.2)
BUN BLDV-MCNC: 23 MG/DL (ref 8–23)
CALCIUM SERPL-MCNC: 9.6 MG/DL (ref 8.8–10.2)
CHLORIDE BLD-SCNC: 101 MMOL/L (ref 98–111)
CO2: 24 MMOL/L (ref 22–29)
CREAT SERPL-MCNC: 0.9 MG/DL (ref 0.5–1.2)
EOSINOPHILS ABSOLUTE: 0.2 K/UL (ref 0–0.6)
EOSINOPHILS RELATIVE PERCENT: 1.7 % (ref 0–5)
GFR AFRICAN AMERICAN: >59
GFR NON-AFRICAN AMERICAN: >60
GLUCOSE BLD-MCNC: 105 MG/DL (ref 74–109)
HCT VFR BLD CALC: 41.4 % (ref 42–52)
HEMOGLOBIN: 14.1 G/DL (ref 14–18)
IMMATURE GRANULOCYTES #: 0.1 K/UL
LYMPHOCYTES ABSOLUTE: 2.8 K/UL (ref 1.1–4.5)
LYMPHOCYTES RELATIVE PERCENT: 32.1 % (ref 20–40)
MCH RBC QN AUTO: 32.3 PG (ref 27–31)
MCHC RBC AUTO-ENTMCNC: 34.1 G/DL (ref 33–37)
MCV RBC AUTO: 95 FL (ref 80–94)
MONOCYTES ABSOLUTE: 0.8 K/UL (ref 0–0.9)
MONOCYTES RELATIVE PERCENT: 9.6 % (ref 0–10)
NEUTROPHILS ABSOLUTE: 4.8 K/UL (ref 1.5–7.5)
NEUTROPHILS RELATIVE PERCENT: 55.4 % (ref 50–65)
PDW BLD-RTO: 12.5 % (ref 11.5–14.5)
PLATELET # BLD: 267 K/UL (ref 130–400)
PMV BLD AUTO: 10 FL (ref 9.4–12.4)
POTASSIUM SERPL-SCNC: 3.6 MMOL/L (ref 3.5–5)
RBC # BLD: 4.36 M/UL (ref 4.7–6.1)
REASON FOR REJECTION: NORMAL
REJECTED TEST: NORMAL
SODIUM BLD-SCNC: 137 MMOL/L (ref 136–145)
TOTAL PROTEIN: 6.5 G/DL (ref 6.6–8.7)
TROPONIN: <0.01 NG/ML (ref 0–0.03)
WBC # BLD: 8.7 K/UL (ref 4.8–10.8)

## 2020-08-11 PROCEDURE — 99284 EMERGENCY DEPT VISIT MOD MDM: CPT

## 2020-08-11 PROCEDURE — 85025 COMPLETE CBC W/AUTO DIFF WBC: CPT

## 2020-08-11 PROCEDURE — 80053 COMPREHEN METABOLIC PANEL: CPT

## 2020-08-11 PROCEDURE — 71045 X-RAY EXAM CHEST 1 VIEW: CPT

## 2020-08-11 PROCEDURE — 93005 ELECTROCARDIOGRAM TRACING: CPT | Performed by: EMERGENCY MEDICINE

## 2020-08-11 PROCEDURE — 36415 COLL VENOUS BLD VENIPUNCTURE: CPT

## 2020-08-11 PROCEDURE — 84484 ASSAY OF TROPONIN QUANT: CPT

## 2020-08-11 PROCEDURE — 99999 PR OFFICE/OUTPT VISIT,PROCEDURE ONLY: CPT | Performed by: EMERGENCY MEDICINE

## 2020-08-11 ASSESSMENT — ENCOUNTER SYMPTOMS
VOICE CHANGE: 0
DIARRHEA: 0
SHORTNESS OF BREATH: 0
EYE PAIN: 0
ABDOMINAL PAIN: 0
EYE REDNESS: 0
COUGH: 0
RHINORRHEA: 0
VOMITING: 0

## 2020-08-11 ASSESSMENT — PAIN SCALES - GENERAL: PAINLEVEL_OUTOF10: 3

## 2020-08-11 ASSESSMENT — PAIN DESCRIPTION - LOCATION: LOCATION: CHEST

## 2020-08-12 ENCOUNTER — TELEPHONE (OUTPATIENT)
Dept: CARDIOLOGY | Age: 70
End: 2020-08-12

## 2020-08-12 VITALS
TEMPERATURE: 98 F | SYSTOLIC BLOOD PRESSURE: 120 MMHG | HEIGHT: 72 IN | WEIGHT: 220 LBS | HEART RATE: 70 BPM | BODY MASS INDEX: 29.8 KG/M2 | DIASTOLIC BLOOD PRESSURE: 67 MMHG | OXYGEN SATURATION: 95 % | RESPIRATION RATE: 20 BRPM

## 2020-08-12 LAB
EKG P AXIS: 33 DEGREES
EKG P AXIS: 58 DEGREES
EKG P-R INTERVAL: 212 MS
EKG P-R INTERVAL: 214 MS
EKG Q-T INTERVAL: 418 MS
EKG Q-T INTERVAL: 470 MS
EKG QRS DURATION: 106 MS
EKG QRS DURATION: 108 MS
EKG QTC CALCULATION (BAZETT): 426 MS
EKG QTC CALCULATION (BAZETT): 471 MS
EKG T AXIS: 29 DEGREES
EKG T AXIS: 55 DEGREES
TROPONIN: <0.01 NG/ML (ref 0–0.03)

## 2020-08-12 PROCEDURE — 36415 COLL VENOUS BLD VENIPUNCTURE: CPT

## 2020-08-12 PROCEDURE — 93010 ELECTROCARDIOGRAM REPORT: CPT | Performed by: INTERNAL MEDICINE

## 2020-08-12 PROCEDURE — 93005 ELECTROCARDIOGRAM TRACING: CPT | Performed by: EMERGENCY MEDICINE

## 2020-08-12 PROCEDURE — 84484 ASSAY OF TROPONIN QUANT: CPT

## 2020-08-12 NOTE — ED PROVIDER NOTES
[facility] EMERGENCY DEPT  EMERGENCY DEPARTMENT ENCOUNTER      Pt Name: Indra Villegas  MRN: [id number]  [place] 1950  Date of evaluation: 8/11/2020  Provider: Christy Garcia MD    [address] [place] [address]       Chief Complaint   Patient presents with    Chest Pain     started at 2100 while taking a shower         HISTORY OF PRESENT ILLNESS   (Location/Symptom, Timing/Onset,Context/Setting, Quality, Duration, Modifying Factors, Severity)  Note limiting factors. Indra Villegas is a 71 y.o. male who presents to the emergency department with complaint of episode of chest pain that occurred tonight associated with diaphoresis. States he started having chest pain when he was in the shower tonight which he states is not unusual for him. States he took a nitro after he got out of the shower and the pain did not get better with that and he started to have profuse sweating. States those symptoms resolved on the way here for further evaluation. Patient has known history of coronary artery disease with a history of previous quadruple bypass. Was admitted here in October and had a heart catheterization performed at that time. HPI    NursingNotes were reviewed. REVIEW OF SYSTEMS    (2-9 systems for level 4, 10 or more for level 5)     Review of Systems   Constitutional: Positive for diaphoresis. Negative for fatigue and fever. HENT: Negative for congestion, rhinorrhea and voice change. Eyes: Negative for pain and redness. Respiratory: Negative for cough and shortness of breath. Cardiovascular: Positive for chest pain. Gastrointestinal: Negative for abdominal pain, diarrhea and vomiting. Endocrine: Negative. Genitourinary: Negative. Musculoskeletal: Negative for arthralgias and gait problem. Skin: Negative for rash and wound. Neurological: Positive for dizziness. Negative for weakness and headaches. Hematological: Negative. Psychiatric/Behavioral: Negative.     All other systems reviewed and are negative. A complete review of systems was performed and is negative except as noted above in the HPI. PAST MEDICAL HISTORY     Past Medical History:   Diagnosis Date    Atrial fibrillation [facility])     Atrial flutter ([facility]     CAD (coronary artery disease)     CAD (coronary atherosclerotic disease)     Diabetes mellitus ([facility]     diet controlled    Ex-smoker     quit 2013 /smoked 50 yrs    History of amiodarone therapy     [facility]     [facility]      Hypertension     Hypokalemia     Hypotension     [facility]     S/P CABG x 4 11/11/2013 6/27/13    Stroke ([facility] 08/22/2017    eye         SURGICAL HISTORY       Past Surgical History:   Procedure Laterality Date    ABLATION OF DYSRHYTHMIC FOCUS      CARDIAC CATHETERIZATION  6/27/13  MDL    EF 45%    CARDIAC SURGERY      CABG x 4    CATARACT REMOVAL WITH IMPLANT Bilateral     COLONOSCOPY N/A 6/16/2020    Dr Amber Velez x 1, AP x 1, 5 yr recall    CORONARY ARTERY BYPASS GRAFT  6/27/2013     Emergency CABG x 4, LIMA-DIAG, SVG-LAD, SVG-OM, SVG-PDA, RT EVH, DR DOLAN    CYST INCISION AND DRAINAGE N/A     RECTAL    EYE SURGERY      EYE SURGERY      cataract sx and implants (both eyes)    OTHER SURGICAL HISTORY      heart ablation    RETROPHYARYNGEAL ABCESS INCISION/DRAIN      Abcess near rectum         CURRENT MEDICATIONS       Discharge Medication List as of 8/12/2020  1:22 AM      CONTINUE these medications which have NOT CHANGED    Details   isosorbide mononitrate (IMDUR) 60 MG extended release tablet Take 1 tablet by mouth 2 times daily, Disp-15 tablet, R-1Normal      lisinopril (PRINIVIL;ZESTRIL) 2.5 MG tablet Take 1 tablet by mouth daily, Disp-30 tablet, R-3Normal      ranolazine (RANEXA) 1000 MG extended release tablet Take 1 tablet by mouth 2 times daily, Disp-16 tablet, R-0Normal      metoprolol tartrate (LOPRESSOR) 25 MG tablet Take 1 tablet by mouth 2 times daily, Disp-60 tablet, R-0Print      nitroGLYCERIN (NITROSTAT) 0.4 MG SL tablet up to max of 3 total doses. If no relief after 1 dose, call 911., Disp-25 tablet, R-5Normal      potassium chloride (KLOR-CON) 20 MEQ packet Take 1/2 tablet dailyHistorical Med      atorvastatin (LIPITOR) 80 MG tablet Take 1 tablet by mouth nightly, Disp-90 tablet, R-3Print      ELIQUIS 5 MG TABS tablet TAKE 1 TABLET BY MOUTH 2 TIMES DAILY, R-3, DAWHistorical Med      furosemide (LASIX) 80 MG tablet Take 1 tablet by mouth daily, Disp-90 tablet, R-3Normal      aspirin 81 MG tablet Take 81 mg by mouth daily      Cholecalciferol (VITAMIN D) 2000 UNITS CAPS capsule Take 1/2 tablet dailyHistorical Med             ALLERGIES     Amiodarone; Azithromycin; Histamine; Pcn [penicillins];  Penicillins; and Unable to assess    FAMILY HISTORY       Family History   Problem Relation Age of Onset    Stroke Father     Heart Disease Father     High Blood Pressure Father     High Cholesterol Father     Other Father         \"swelling on vein behind stomach\"    Heart Disease Other     Colon Cancer Neg Hx     Colon Polyps Neg Hx     Esophageal Cancer Neg Hx     Liver Cancer Neg Hx     Liver Disease Neg Hx     Rectal Cancer Neg Hx     Stomach Cancer Neg Hx           SOCIAL HISTORY       Social History     Socioeconomic History    Marital status:      Spouse name: None    Number of children: None    Years of education: None    Highest education level: None   Occupational History    None   Social Needs    Financial resource strain: None    Food insecurity     Worry: None     Inability: None    Transportation needs     Medical: None     Non-medical: None   Tobacco Use    Smoking status: Former Smoker     Packs/day: 1.00     Years: 15.00     Pack years: 15.00     Types: Cigarettes     Last attempt to quit: 2013     Years since quittin.1    Smokeless tobacco: Never Used   Substance and Sexual Activity    Alcohol use: No    Drug use: No    Sexual activity: Yes     Partners: Female   Lifestyle    Physical activity     Days per week: None     Minutes per session: None    Stress: None   Relationships    Social connections     Talks on phone: None     Gets together: None     Attends Voodoo service: None     Active member of club or organization: None     Attends meetings of clubs or organizations: None     Relationship status: None    Intimate partner violence     Fear of current or ex partner: None     Emotionally abused: None     Physically abused: None     Forced sexual activity: None   Other Topics Concern    None   Social History Narrative    ** Merged History Encounter **            SCREENINGS    Bradford Coma Scale  Eye Opening: Spontaneous  Best Verbal Response: Oriented  Best Motor Response: Obeys commands  Bradford Coma Scale Score: 15        PHYSICAL EXAM    (up to 7 for level 4, 8 or more for level 5)     ED Triage Vitals [08/11/20 2206]   BP Temp Temp src Pulse Resp SpO2 Height Weight   (!) 141/75 98 °F (36.7 °C) -- 76 20 98 % 6' (1.829 m) 220 lb (99.8 kg)       Physical Exam  Vitals signs and nursing note reviewed. Constitutional:       General: He is not in acute distress. Appearance: He is well-developed. He is not diaphoretic. HENT:      Head: Normocephalic and atraumatic. Eyes:      General: No scleral icterus. Neck:      Vascular: No JVD. Cardiovascular:      Rate and Rhythm: Normal rate and regular rhythm. Pulses:           Radial pulses are 2+ on the right side and 2+ on the left side. Dorsalis pedis pulses are 2+ on the right side and 2+ on the left side. Heart sounds: Normal heart sounds. No murmur. No friction rub. No gallop. Pulmonary:      Effort: Pulmonary effort is normal. No accessory muscle usage or respiratory distress. Breath sounds: Normal breath sounds. No stridor. No decreased breath sounds, wheezing, rhonchi or rales. Chest:      Chest wall: No tenderness.    Abdominal: General: There is no distension. Palpations: Abdomen is soft. Tenderness: There is no abdominal tenderness. There is no guarding or rebound. Musculoskeletal: Normal range of motion. General: No deformity. Right lower leg: No edema. Left lower leg: No edema. Skin:     General: Skin is warm and dry. Coloration: Skin is not pale. Findings: No erythema. Neurological:      Mental Status: He is alert and oriented to person, place, and time. GCS: GCS eye subscore is 4. GCS verbal subscore is 5. GCS motor subscore is 6. Cranial Nerves: No cranial nerve deficit. Motor: No abnormal muscle tone. Coordination: Coordination normal.   Psychiatric:         Behavior: Behavior normal.         Judgment: Judgment normal.         DIAGNOSTIC RESULTS     EKG: All EKG's are interpreted by the Emergency Department Physician who either signs or Co-signs this chart in the absence of a cardiologist.    Normal sinus rhythm with a rate of 66. No findings of acute ischemia or infarction. RADIOLOGY:   Non-plain film images such as CT, Ultrasound and MRI are read by the radiologist. [facility] are visualized and preliminarily interpreted by the emergency physician with the below findings:        Interpretation per the Radiologist below, if available at the time of this note:    XR CHEST PORTABLE    (Results Pending)         ED BEDSIDE ULTRASOUND:   Performed by ED Physician - none    LABS:  Labs Reviewed   CBC WITH AUTO DIFFERENTIAL - Abnormal; Notable for the following components:       Result Value    RBC 4.36 (*)     Hematocrit 41.4 (*)     MCV 95.0 (*)     MCH 32.3 (*)     All other components within normal limits   COMPREHENSIVE METABOLIC PANEL - Abnormal; Notable for the following components:     Total Protein 6.5 (*)     All other components within normal limits   SPECIMEN REJECTION   TROPONIN   TROPONIN       All other labs were within normal range or not returned as of this dictation. Medications - No data to display    EMERGENCY DEPARTMENT COURSE and DIFFERENTIALDIAGNOSIS/MDM:   Vitals:    Vitals:    08/11/20 2255 08/11/20 2334 08/12/20 0050 08/12/20 0134   BP: 108/66 111/71 120/71 120/67   Pulse: 61 62 67 70   Resp: 14 18 20 20   Temp:       SpO2: 96% 95% 95% 95%   Weight:       Height:           MDM      Evaluation here is unremarkable. Patient symptoms had almost completely resolved. States the chest pain is not unusual but the episode of diaphoresis was unusual for him. Labs and EKG show no signs of cardiac ischemia or other acute cardiopulmonary process as a cause of patient's pain. Discussed further work-up and management options with patient given his history and the episode tonight and he is agreeable for discharge with outpatient primary care and cardiology follow-up rather than admission to the hospital tonight. Evaluation and work-up here revealed no signs of emergent or life-threatening pathology that would necessitate admission for further work-up or management at this time. It is felt to be stable for discharge home with return precautions for worsening of the condition or development of new concerning symptoms. Patient was encouraged to follow-up with their primary care doctor in the appropriate timeframe. Necessary prescriptions and information have been provided for treatment at home. Patient voices understanding and agreement with the plan. CONSULTS:  None    PROCEDURES:  Unless otherwise notedbelow, none     Procedures      FINAL IMPRESSION     1. Chest pain, unspecified type    2. Coronary artery disease involving coronary bypass graft of native heart with angina pectoris ([facility]    3.  Hx of CABG          DISPOSITION/PLAN   DISPOSITION        PATIENT REFERRED TO:  [facility] EMERGENCY DEPT  Matt Reyes  [phone]    If symptoms worsen    Maksim Davidson MD  [address] [address]  [phone]    Schedule an appointment as soon as possible for a visit       Vane Sims MD  [address] [address]  [phone]    Schedule an appointment as soon as possible for a visit in 2 days        DISCHARGE MEDICATIONS:  Discharge Medication List as of 8/12/2020  1:22 AM             (Please note that portions of this note were completed with a voice recognition program.  Efforts were made to edit the dictations butoccasionally words are mis-transcribed.)    America Luna MD (electronically signed)  AttendingEmerBaptist Health Medical Center Physician          America Bird MD  08/12/20 0707

## 2020-08-12 NOTE — ED NOTES
Patient placed in a gown Patient placed on cardiac monitor, continuous pulse oximeter, and NIBP monitor.  Monitor alarms on.       Escobar Childress RN  08/11/20 2469

## 2020-08-12 NOTE — ED NOTES
Pt states that he has taken 2 81mg Aspirin and 1 Nitro prior to arrival      [facility]  08/11/20 8533

## 2020-08-13 ENCOUNTER — OFFICE VISIT (OUTPATIENT)
Dept: CARDIOLOGY | Age: 70
End: 2020-08-13
Payer: MEDICARE

## 2020-08-13 ENCOUNTER — TELEPHONE (OUTPATIENT)
Dept: CARDIOLOGY | Age: 70
End: 2020-08-13

## 2020-08-13 VITALS
DIASTOLIC BLOOD PRESSURE: 64 MMHG | BODY MASS INDEX: 30.2 KG/M2 | SYSTOLIC BLOOD PRESSURE: 106 MMHG | HEART RATE: 58 BPM | WEIGHT: 223 LBS | HEIGHT: 72 IN | OXYGEN SATURATION: 98 %

## 2020-08-13 PROBLEM — Z95.1 HX OF CABG: Status: ACTIVE | Noted: 2020-08-13

## 2020-08-13 PROCEDURE — 99214 OFFICE O/P EST MOD 30 MIN: CPT | Performed by: NURSE PRACTITIONER

## 2020-08-13 RX ORDER — ISOSORBIDE MONONITRATE 60 MG/1
TABLET, EXTENDED RELEASE ORAL
Qty: 15 TABLET | Refills: 1 | Status: SHIPPED | OUTPATIENT
Start: 2020-08-13 | End: 2020-08-27 | Stop reason: SDUPTHER

## 2020-08-13 RX ORDER — ISOSORBIDE MONONITRATE 60 MG/1
TABLET, EXTENDED RELEASE ORAL
Qty: 270 TABLET | Refills: 3 | Status: CANCELLED | OUTPATIENT
Start: 2020-08-13

## 2020-08-13 NOTE — TELEPHONE ENCOUNTER
Dr. Joshua Nichols consulted today after office visit. Dr. Joshua Nichols reviewed cath films as requested. Dr. Joshua Nichols message:  \" Patent LIMA to diagonal and saphenous vein graft to right coronary artery. Disease saphenous vein graft to a small LAD which does not reach the apex. Known occluded saphenous vein graft to obtuse marginal.  Ejection fraction down with inferior and apical akinesis with likely prior right coronary artery infarct. Can do another Lexiscan to see if any changes unless he is having more angina now since ED visit. Do not expect saphenous vein graft to LAD to last and not a great target for PCI. \"    Plan to contact patient and schedule Lexiscan.  tlm

## 2020-08-13 NOTE — PATIENT INSTRUCTIONS
New instructions for today:  Start taking Imdur (isosorbide) 60 mg (1) tab AM and (1-1/2) tab PM.    I will be having Dr. Jorge Goodrich review your last heart catheterization films and make a recommendation for you. If your symptoms worsen, go to the emergency room. How to take:  NITROGLYCERIN (Nitrostat) 0.4 mg tablets, sublingual.  Nitroglycerin is in a group of drugs called nitrates. Nitroglycerin dilates (widens) blood vessels, making it easier for blood to flow through them and easier for the heart to pump. Dosing Guidelines for Nitroglycerin Tablets  · At the start of an angina (chest pain) attack, place one tablet under the tongue or between the cheek and gum. Do not swallow or chew the tablet; let it dissolve on its own. If necessary, a second and third tablet may be used, with five minutes between using each tablet. If you use a third tablet and your chest pain continues, it is time to seek immediate medical attention. Call 911 immediately and have someone drive you to the emergency room. You may be having a heart attack or other serious heart problem. · To prevent angina from exercise or stress, use 1 tablet 5 to 10 minutes before the activity. Eliquis can increase your risk of bleeding. If you notice blood in urine or stool, bleeding gums, excessive bruising or cough productive of bloody sputum, notify the office. Information on this blood thinner has been included in your after visit summary. Patient Instructions:  Continue current medications as prescribed. Always keep a current medication list. Bring your medications to every office visit. Continue to follow up with primary care provider for non cardiac medical problems. Call the office with any problems, questions or concerns at [phone]. If you have been asked to keep a blood pressure log, do so for 2 weeks. Call the office to report readings to the triage nurse at [phone]. Follow up with cardiologist as scheduled.   2 weeks.  The following educational material has been included in this after visit summary for your review: Life simple 7. Heart health. Life simple 7  1) Manage blood pressure - high blood pressure is a major risk factor for heart disease and stroke. Keeping blood pressure in health range reduces strain on your heart, arteries and kidneys. Blood pressure goal is less than 130/80. 2) Control cholesterol - contributes to plaque, which can clog arteries and lead to heart disease and stroke. When you control your cholesterol you are giving your arteries their best chance to remain clear. It is recommended that you get cholesterol lab work done once a year. 3) Reduce blood sugar - most of the food we eat is turning into glucose or blood sugar that our body uses for energy. Over time, high levels of blood sugar can damage your heart, kidneys, eyes and nerves. 4) Get active - living an active life is one of the most rewarding gifts you can give yourself and those you love. Simply put, daily physical activity increases your length and quality of life. Strive to exercise 15 minutes most days of the week. 5)  Eat better - A healthy diet is one of your best weapons for fighting cardiovascular disease. When you eat a heart healthy diet, you improve your chances for feeling good and staying healthy for life. 6)  Lose weight - when you shed extra fat an unnecessary pounds, you reduce the burden on your hear, lungs, blood vessels and skeleton. You give yourself the gift of active living, you lower your blood pressure and help yourself feel better. 7) Stop smoking - cigarette smokers have a higher risk of developing cardiovascular disease. If  You smoke, quitting is the best thing you can do for your health. Check American Heart Association on line for more information on Life's Simple 7 and tips for healthy living.      A Healthy Heart: Care Instructions  Your Care Instructions     Coronary artery disease, also called heart disease, occurs when a substance called plaque builds up in the vessels that supply oxygen-rich blood to your heart muscle. This can narrow the blood vessels and reduce blood flow. A heart attack happens when blood flow is completely blocked. A high-fat diet, smoking, and other factors increase the risk of heart disease. Your doctor has found that you have a chance of having heart disease. You can do lots of things to keep your heart healthy. It may not be easy, but you can change your diet, exercise more, and quit smoking. These steps really work to lower your chance of heart disease. Follow-up care is a key part of your treatment and safety. Be sure to make and go to all appointments, and call your doctor if you are having problems. It's also a good idea to know your test results and keep a list of the medicines you take. How can you care for yourself at home? Diet  · Use less salt when you cook and eat. This helps lower your blood pressure. Taste food before salting. Add only a little salt when you think you need it. With time, your taste buds will adjust to less salt. · Eat fewer snack items, fast foods, canned soups, and other high-salt, high-fat, processed foods. · Read food labels and try to avoid saturated and trans fats. They increase your risk of heart disease by raising cholesterol levels. · Limit the amount of solid fat-butter, margarine, and shortening-you eat. Use olive, peanut, or canola oil when you cook. Bake, broil, and steam foods instead of frying them. · Eat a variety of fruit and vegetables every day. Dark green, deep orange, red, or yellow fruits and vegetables are especially good for you. Examples include spinach, carrots, peaches, and berries. · Foods high in fiber can reduce your cholesterol and provide important vitamins and minerals. High-fiber foods include whole-grain cereals and breads, oatmeal, beans, brown rice, citrus fruits, and apples. · Eat lean proteins. Heart-healthy proteins include seafood, lean meats and poultry, eggs, beans, peas, nuts, seeds, and soy products. · Limit drinks and foods with added sugar. These include candy, desserts, and soda pop. Lifestyle changes  · If your doctor recommends it, get more exercise. Walking is a good choice. Bit by bit, increase the amount you walk every day. Try for at least 30 minutes on most days of the week. You also may want to swim, bike, or do other activities. · Do not smoke. If you need help quitting, talk to your doctor about stop-smoking programs and medicines. These can increase your chances of quitting for good. Quitting smoking may be the most important step you can take to protect your heart. It is never too late to quit. · Limit alcohol to 2 drinks a day for men and 1 drink a day for women. Too much alcohol can cause health problems. · Manage other health problems such as diabetes, high blood pressure, and high cholesterol. If you think you may have a problem with alcohol or drug use, talk to your doctor. Medicines  · Take your medicines exactly as prescribed. Call your doctor if you think you are having a problem with your medicine. · If your doctor recommends aspirin, take the amount directed each day. Make sure you take aspirin and not another kind of pain reliever, such as acetaminophen (Tylenol). When should you call for help? [id number] if you have symptoms of a heart attack. These may include:  · Chest pain or pressure, or a strange feeling in the chest.  · Sweating. · Shortness of breath. · Pain, pressure, or a strange feeling in the back, neck, jaw, or upper belly or in one or both shoulders or arms. · Lightheadedness or sudden weakness. · A fast or irregular heartbeat. After you call 911, the  may tell you to chew 1 adult-strength or 2 to 4 low-dose aspirin. Wait for an ambulance. Do not try to drive yourself.   Watch closely for changes in your health, and be sure to contact your

## 2020-08-13 NOTE — PROGRESS NOTES
[facility], [place]. [address], [facility] [address] [facility]  [phone] office  [phone] fax      OFFICE VISIT:  2020    Francoise May - : 1950  Reason For Visit:  Yani Rowan is a 71 y.o. male who is here for Follow-Up from Hospital (FU from ED. Had chest pain 20, Took nitro and 2 81 mg aspirins.)    History:   Diagnosis Orders   1. Chest pain, unspecified type     2. Coronary artery disease involving native coronary artery of native heart, angina presence unspecified     3. Hx of CABG     4. Essential hypertension     5. Paroxysmal atrial fibrillation (HCC)     6. Chronic anticoagulation      Eliquis   7. Mixed hyperlipidemia       The patient presents today for cardiology follow up after ED visit on 20 for mid sternal chest pain with diaphoresis. The patient has known CAD s/p CABG x 4 with graft occlusion VG-OM. He reports it is normal for him to experience angina 2-3 times per week. He takes NTG sl x 1 with relief. He reports \"I got worried because I sweated really bad for the first time Tuesday with the chest pain. \"    Mr. Kole Garcia is currently on optimum medical management for CAD to include Ranexa 1000 mg BID, Imdur 60 mg BID and  Lopressor 25 mg BID. The patient had a cardiac cath last on 10/12/19 by Dr. Kim Newell showing severe NVD, patent LIMA-LAD, patent yet extremely small VG-LAD, patent VG-RCA, closed VG-OM, anterior lateral and inferior severe hypokinesis EF 30%. BP runs normal to low at vilma per patient. The patient's PCP monitors cholesterol. Subjective  Ken E denies  orthopnea, paroxysmal nocturnal dyspnea, syncope, presyncope, sensed arrhythmia, edema and fatigue. The patient denies numbness or weakness to suggest cerebrovascular accident or transient ischemic attack. + mid sternal chest pain relieved with NTG sl x one 2-3 times per week - reported as stable.   Episode this past Tuesday with associated diaphoresis. + stable WHITMORE. Pankaj King has the following history as recorded in [facility]:  Patient Active Problem List   Diagnosis Code    CAD (coronary artery disease) I25.10    Mixed hyperlipidemia E78.2    S/P ablation of atrial fibrillation Z98.890, Z86.79    History of amiodarone therapy Z92.29    Ex-smoker Z87.891    Chest pain R07.9    Paroxysmal atrial fibrillation (HCC) I48.0    Chronic combined systolic and diastolic heart failure (HCC) I50.42    Sick sinus syndrome (HCC) I49.5    Amaurosis fugax G45.3    Essential hypertension I10    Chronic anticoagulation Z79.01    Simple chronic bronchitis (HCC) J41.0    Type 2 diabetes mellitus without complication, without long-term current use of insulin (HCC) E11.9    Obesity (BMI 30.0-34. 9) E66.9    Hx of CABG Z95.1     Past Medical History:   Diagnosis Date    Atrial fibrillation (HCC)     Atrial flutter (HCC)     CAD (coronary artery disease)     CAD (coronary atherosclerotic disease)     Diabetes mellitus ([address]     diet controlled    Ex-smoker     quit 2013 /smoked 50 yrs    History of amiodarone therapy     [facility]lipidemia     [facility]      Hypertension     Hypokalemia     Hypotension     MI (myocardial infarction) ([address]     S/P CABG x 4 11/11/2013 6/27/13    Stroke [facility] 08/22/2017    eye     Past Surgical History:   Procedure Laterality Date    ABLATION OF DYSRHYTHMIC FOCUS      CARDIAC CATHETERIZATION  6/27/13  MDL    EF 45%    CARDIAC SURGERY      CABG x 4    CATARACT REMOVAL WITH IMPLANT Bilateral     COLONOSCOPY N/A 6/16/2020    Dr Aiden Anna x 1, AP x 1, 5 yr recall    CORONARY ARTERY BYPASS GRAFT  6/27/2013     Emergency CABG x 4, LIMA-DIAG, SVG-LAD, SVG-OM, SVG-PDA, RT EVH, DR DOLAN    CYST INCISION AND DRAINAGE N/A     RECTAL    EYE SURGERY      EYE SURGERY      cataract sx and implants (both eyes)    OTHER SURGICAL HISTORY      heart ablation    RETROPHYARYNGEAL ABCESS INCISION/DRAIN      Abcess near rectum     Family History   Problem Relation Age of Onset    Stroke Father     Heart Disease Father     High Blood Pressure Father     High Cholesterol Father     Other Father         \"swelling on vein behind stomach\"    Heart Disease Other     Colon Cancer Neg Hx     Colon Polyps Neg Hx     Esophageal Cancer Neg Hx     Liver Cancer Neg Hx     Liver Disease Neg Hx     Rectal Cancer Neg Hx     Stomach Cancer Neg Hx      Social History     Tobacco Use    Smoking status: Former Smoker     Packs/day: 1.00     Years: 15.00     Pack years: 15.00     Types: Cigarettes     Last attempt to quit: 2013     Years since quittin.1    Smokeless tobacco: Never Used   Substance Use Topics    Alcohol use: No      Current Outpatient Medications   Medication Sig Dispense Refill    isosorbide mononitrate (IMDUR) 60 MG extended release tablet Take 1 tablet by mouth 2 times daily 15 tablet 1    lisinopril (PRINIVIL;ZESTRIL) 2.5 MG tablet Take 1 tablet by mouth daily 30 tablet 3    ranolazine (RANEXA) 1000 MG extended release tablet Take 1 tablet by mouth 2 times daily 16 tablet 0    metoprolol tartrate (LOPRESSOR) 25 MG tablet Take 1 tablet by mouth 2 times daily 60 tablet 0    nitroGLYCERIN (NITROSTAT) 0.4 MG SL tablet up to max of 3 total doses. If no relief after 1 dose, call 911. 25 tablet 5    potassium chloride (KLOR-CON) 20 MEQ packet Take 1/2 tablet daily      atorvastatin (LIPITOR) 80 MG tablet Take 1 tablet by mouth nightly (Patient taking differently: Take 80 mg by mouth every morning ) 90 tablet 3    ELIQUIS 5 MG TABS tablet TAKE 1 TABLET BY MOUTH 2 TIMES DAILY  3    furosemide (LASIX) 80 MG tablet Take 1 tablet by mouth daily 90 tablet 3    aspirin 81 MG tablet Take 81 mg by mouth daily      Cholecalciferol (VITAMIN D) 2000 UNITS CAPS capsule Take 1/2 tablet daily       No current facility-administered medications for this visit.         Allergies: Amiodarone; Azithromycin; Histamine; Pcn [penicillins]; Penicillins; and Unable to assess    Review of Systems  Constitutional - no appetite change, or unexpected weight change. No fever, chills or diaphoresis. No significant change in activity level or new onset of fatigue. HEENT - no significant rhinorrhea or epistaxis. No tinnitus or significant hearing loss. Eyes - no sudden vision change or amaurosis. No corneal arcus, xantholasma, subconjunctival hemorrhage or discharge. Respiratory - no significant wheezing, stridor, apnea or cough. + stable WHITMORE. Cardiovascular - no orthopnea or PND. No sensation of sustained arrythmia. No occurrence of slow heart rate. No palpitations. No claudication. + mid sternal chest pain relieved with NTG sl x one 2-3 times per week - reported as stable. Episode this past Tuesday with associated diaphoresis. Gastrointestinal - no abdominal swelling or pain. No blood in stool. No severe constipation, diarrhea, nausea, or vomiting. Genitourinary - no dysuria, frequency, or urgency. No flank pain or hematuria. Musculoskeletal - no back pain or myalgia. No problems with gait. Extremities - no clubbing, cyanosis or extremity edema. Skin - no color change or rash. No pallor. No new surgical incision. Neurologic - no speech difficulty, facial asymmetry or lateralizing weakness. No seizures, presyncope or syncope. No significant dizziness. Hematologic - no easy bruising or excessive bleeding. Psychiatric - no severe anxiety or insomnia. No confusion. All other review of systems are negative. Objective  Vital Signs - /64   Pulse 58   Ht 6' (1.829 m)   Wt 223 lb (101.2 kg)   SpO2 98%   BMI 30.24 kg/m²   [facility] E is alert, cooperative, and pleasant. Well groomed. No acute distress. Body habitus - Body mass index is 30.24 kg/m². HEENT - Head is normocephalic. No circumoral cyanosis.   Dentition is normal.  EYES -   Lids normal without ptosis. No discharge, edema or subconjunctival hemorrhage. Neck - Symmetrical without apparent mass or lymphadenopathy. Respiratory - Normal respiratory effort without use of accessory muscles. Ausculatation reveals vesicular breath sounds without crackles, wheezes, rub or rhonchi. Cardiovascular - No jugular venous distention. Auscultation reveals regular rate and rhythm. No audible clicks, gallop or rub. No murmur. No lower extremity varicosities. No carotid bruits. Abdominal -  No visible distention, mass or pulsations. Extremities - No clubbing or cyanosis. No statis dermatitis or ulcers. No edema. Musculoskeletal -   No Osler's nodes. No kyphosis or scoliosis. Gait is even and regular without limp or shuffle. Ambulates without assistance. Skin -  Warm and dry; no rash or pallor. No new surgical wound. Neurological - No focal neurological deficits. Thought processes coherent. No apparent tremor. Oriented to person, place and time. Psychiatric -  Appropriate affect and mood. Assessment:     Diagnosis Orders   1. Chest pain, unspecified type     2. Coronary artery disease involving native coronary artery of native heart, angina presence unspecified     3. Hx of CABG     4. Essential hypertension     5. Paroxysmal atrial fibrillation (HCC)     6. Chronic anticoagulation      Eliquis   7.  Mixed hyperlipidemia       Data reviewed:  Prior Cardiac History -   06/27/2013 CABG X4 LIMA-diag, VG-LAD, VG-OM, VG-PDA Abbi Loop)  11/10/2015  SE negative for myocardial ischemia  6/24/2016  TTE  Normal LVFX  7/2/2018 lexiscan Positive for inferior MI + myocardial ischemia, EF 50%, 12% ischemic myocardium on stress, intermediate risk findings, AUC indication 17, AUC score 7  7/11/18  Cath  Severe NVD, patent LIMA-LAD, patent yet extremely small VG-LAD, patent VG-RCA, closed VG-OM, anterior lateral akinesis, EF 35%  10/11/19  lexiscan Positive for inferior lateral myocardial ischemia, EF 51%, 1% ischemic myocardium on stress, low risk findings, AUC indication 15, AUC score 4, (MD Shay)   10/12/19  Cath  Severe NVD, patent LIMA-LAD, patent yet extremely small VG-LAD, patent VG-RCA, closed VG-OM, anterior lateral and inferior severe hypokinesis EF 30%    10/10/19 cath  Summary:     1.  Successful femoral artery ultrasound  2. Successful femoral artery arteriogram  3. Supervision of the administration of moderate conscious sedation  4. Severe triple vessel coronary artery disease  5. Left ventricular function is impaired in the anterior lateral, diaphragmatic, and inferior basilar segments with a visually estimate ejection of 30%. 6.  Patent un grafted right ASHWIN  7. Patent left ASHWIN to the LAD  8. Patent vein graft to a very small diagonal  9. Patent vein graft to the PDA   RECOMMENDATIONS:  1. Risk Factor Modification  2. On-going Medical Therapy  3. Intensification of medical management, with the addition of IMDUR and ACE inhibitor  4. May go after bedrest is completed  5.   Will see in the office as arranged    Electronically signed by Blayne Rose MD on 10/12/19     Lab Results   Component Value Date    CKTOTAL 97 08/28/2016    TROPONINI <0.01 08/12/2020     Lab Results   Component Value Date     08/11/2020    K 3.6 08/11/2020     08/11/2020    CO2 24 08/11/2020    BUN 23 08/11/2020    CREATININE 0.9 08/11/2020    GLUCOSE 105 08/11/2020    CALCIUM 9.6 08/11/2020    PROT 6.5 (L) 08/11/2020    LABALBU 3.9 08/11/2020    BILITOT 0.6 08/11/2020    ALKPHOS 64 08/11/2020    AST 14 08/11/2020    ALT 8 08/11/2020    LABGLOM >60 08/11/2020    GFRAA >59 08/11/2020    GLOB 3.4 08/28/2016       Lab Results   Component Value Date    CHOL 126 (L) 03/20/2020    CHOL 122 (L) 01/25/2019    CHOL 120 (L) 12/27/2018     Lab Results   Component Value Date    TRIG 91 03/20/2020    TRIG 121 01/25/2019    TRIG 122 12/27/2018     Lab Results   Component Value Date    HDL 47 (L) 03/20/2020    HDL 41 (L) 01/25/2019    HDL 41 (L) 12/27/2018     Lab Results   Component Value Date    LDLCALC 61 03/20/2020    [address] 57 01/25/2019    [address] 55 12/27/2018     CAD s/p CABG with fairly stable angina -   Continue current medical management. Increase Imdur to 60 mg AM and 90 mg PM.  Consult Dr. Mary Dietrich requesting a review of last cardiac catheterization. PAF - no recurrent AF. No bleeding issue on Eliquis. HTN - normotensive. BP runs low times. Hyperlipidemia - LDL 61 on Lipitor 80 mg daily. Patient is compliant with medication regimen. BP Readings from Last 3 Encounters:   08/13/20 106/64   08/12/20 120/67   06/16/20 124/77    Pulse Readings from Last 3 Encounters:   08/13/20 58   08/12/20 70   06/16/20 60        Wt Readings from Last 3 Encounters:   08/13/20 223 lb (101.2 kg)   08/11/20 220 lb (99.8 kg)   06/16/20 225 lb (102.1 kg)     Plan  Previous cardiac history and records reviewed. Continue current medical management. Increase Imdur. Plan to have Dr. Mary Dietrich review last cardiac cath films and make recommendation. Continue other current medications as directed. Continue to follow up with primary care provider for non cardiac medical problems. Call the office with any problems, questions or concerns at [phone]. Cardiology follow up: 2 weeks. Educational included in patient instructions. Heart Capt'nSocial. NTG SL. Addendum:  Dr. Mary Dietrich reviewed cath films after office visit. Message from Dr. Mary Dietrich: \" Patent LIMA-DIAG and saphenous vein graft to right coronary artery. Diseased saphenous vein graft to a small LAD (does not reach apex). Known occluded saphenous graft to obtuse marginal.  Ejection fraction down with inferior and apical hypokinesis with likely prior right coronary artery infarct. Can do another Lexiscan to see if any changes unless he is having more angina since ED visit. Do not expect saphenous vein graft to LAD to last and not a great target for PCI. \"    Plan to contact patient and schedule Lexiscan rx.      BERNARDO Fall

## 2020-08-14 NOTE — TELEPHONE ENCOUNTER
Called and scheduled Kaylee Bolanos for 08/19/2020 and called patient and went over everything with him

## 2020-08-19 ENCOUNTER — HOSPITAL ENCOUNTER (OUTPATIENT)
Dept: NUCLEAR MEDICINE | Age: 70
Discharge: HOME | End: 2020-08-21
Payer: MEDICARE

## 2020-08-19 PROCEDURE — 93017 CV STRESS TEST TRACING ONLY: CPT

## 2020-08-19 PROCEDURE — 6360000002 HC RX W HCPCS: Performed by: NURSE PRACTITIONER

## 2020-08-19 PROCEDURE — 3430000000 HC RX DIAGNOSTIC RADIOPHARMACEUTICAL: Performed by: NURSE PRACTITIONER

## 2020-08-19 PROCEDURE — A9500 TC99M SESTAMIBI: HCPCS | Performed by: NURSE PRACTITIONER

## 2020-08-19 RX ADMIN — TETRAKIS(2-METHOXYISOBUTYLISOCYANIDE)COPPER(I) TETRAFLUOROBORATE 10 MILLICURIE: 1 INJECTION, POWDER, LYOPHILIZED, FOR SOLUTION INTRAVENOUS at 11:54

## 2020-08-19 RX ADMIN — TETRAKIS(2-METHOXYISOBUTYLISOCYANIDE)COPPER(I) TETRAFLUOROBORATE 30 MILLICURIE: 1 INJECTION, POWDER, LYOPHILIZED, FOR SOLUTION INTRAVENOUS at 11:55

## 2020-08-19 RX ADMIN — REGADENOSON 0.4 MG: 0.08 INJECTION, SOLUTION INTRAVENOUS at 11:26

## 2020-08-24 LAB
LV EF: 42 %
LVEF MODALITY: NORMAL

## 2020-08-27 ENCOUNTER — OFFICE VISIT (OUTPATIENT)
Dept: CARDIOLOGY | Age: 70
End: 2020-08-27
Payer: MEDICARE

## 2020-08-27 VITALS
WEIGHT: 223 LBS | BODY MASS INDEX: 30.2 KG/M2 | HEIGHT: 72 IN | DIASTOLIC BLOOD PRESSURE: 70 MMHG | HEART RATE: 60 BPM | SYSTOLIC BLOOD PRESSURE: 110 MMHG

## 2020-08-27 PROCEDURE — 99214 OFFICE O/P EST MOD 30 MIN: CPT | Performed by: NURSE PRACTITIONER

## 2020-08-27 PROCEDURE — 0296T PR EXT ECG > 48HR TO 21 DAY RCRD W/CONECT INTL RCRD: CPT | Performed by: NURSE PRACTITIONER

## 2020-08-27 RX ORDER — ISOSORBIDE MONONITRATE 60 MG/1
TABLET, EXTENDED RELEASE ORAL
Qty: 225 TABLET | Refills: 3 | Status: SHIPPED | OUTPATIENT
Start: 2020-08-27

## 2020-08-27 NOTE — PATIENT INSTRUCTIONS
New instructions for today:  The adhesive cardiac monitor will need to stay on for 2 weeks. Use the symptom marker as instructed. Mail back monitor in postage paid box provided. Eliquis can increase your risk of bleeding. If you notice blood in urine or stool, bleeding gums, excessive bruising or cough productive of bloody sputum, notify the office. Information on this blood thinner has been included in your after visit summary. How to take:  NITROGLYCERIN (Nitrostat) 0.4 mg tablets, sublingual.  Nitroglycerin is in a group of drugs called nitrates. Nitroglycerin dilates (widens) blood vessels, making it easier for blood to flow through them and easier for the heart to pump. Dosing Guidelines for Nitroglycerin Tablets  · At the start of an angina (chest pain) attack, place one tablet under the tongue or between the cheek and gum. Do not swallow or chew the tablet; let it dissolve on its own. If necessary, a second and third tablet may be used, with five minutes between using each tablet. If you use a third tablet and your chest pain continues, it is time to seek immediate medical attention. Call 911 immediately and have someone drive you to the emergency room. You may be having a heart attack or other serious heart problem. · To prevent angina from exercise or stress, use 1 tablet 5 to 10 minutes before the activity. Patient Instructions:  Continue current medications as prescribed. Always keep a current medication list. Bring your medications to every office visit. Continue to follow up with primary care provider for non cardiac medical problems. Call the office with any problems, questions or concerns at [phone]. If you have been asked to keep a blood pressure log, do so for 2 weeks. Call the office to report readings to the triage nurse at [phone]. Follow up with cardiologist as scheduled.   The following educational material has been included in this after visit summary for your review: Life simple 7. Heart health. Life simple 7  1) Manage blood pressure - high blood pressure is a major risk factor for heart disease and stroke. Keeping blood pressure in health range reduces strain on your heart, arteries and kidneys. Blood pressure goal is less than 130/80. 2) Control cholesterol - contributes to plaque, which can clog arteries and lead to heart disease and stroke. When you control your cholesterol you are giving your arteries their best chance to remain clear. It is recommended that you get cholesterol lab work done once a year. 3) Reduce blood sugar - most of the food we eat is turning into glucose or blood sugar that our body uses for energy. Over time, high levels of blood sugar can damage your heart, kidneys, eyes and nerves. 4) Get active - living an active life is one of the most rewarding gifts you can give yourself and those you love. Simply put, daily physical activity increases your length and quality of life. Strive to exercise 15 minutes most days of the week. 5)  Eat better - A healthy diet is one of your best weapons for fighting cardiovascular disease. When you eat a heart healthy diet, you improve your chances for feeling good and staying healthy for life. 6)  Lose weight - when you shed extra fat an unnecessary pounds, you reduce the burden on your hear, lungs, blood vessels and skeleton. You give yourself the gift of active living, you lower your blood pressure and help yourself feel better. 7) Stop smoking - cigarette smokers have a higher risk of developing cardiovascular disease. If  You smoke, quitting is the best thing you can do for your health. Check American Heart Association on line for more information on Life's Simple 7 and tips for healthy living.      A Healthy Heart: Care Instructions  Your Care Instructions     Coronary artery disease, also called heart disease, occurs when a substance called plaque builds up in the vessels that supply oxygen-rich blood to your heart muscle. This can narrow the blood vessels and reduce blood flow. A heart attack happens when blood flow is completely blocked. A high-fat diet, smoking, and other factors increase the risk of heart disease. Your doctor has found that you have a chance of having heart disease. You can do lots of things to keep your heart healthy. It may not be easy, but you can change your diet, exercise more, and quit smoking. These steps really work to lower your chance of heart disease. Follow-up care is a key part of your treatment and safety. Be sure to make and go to all appointments, and call your doctor if you are having problems. It's also a good idea to know your test results and keep a list of the medicines you take. How can you care for yourself at home? Diet  · Use less salt when you cook and eat. This helps lower your blood pressure. Taste food before salting. Add only a little salt when you think you need it. With time, your taste buds will adjust to less salt. · Eat fewer snack items, fast foods, canned soups, and other high-salt, high-fat, processed foods. · Read food labels and try to avoid saturated and trans fats. They increase your risk of heart disease by raising cholesterol levels. · Limit the amount of solid fat-butter, margarine, and shortening-you eat. Use olive, peanut, or canola oil when you cook. Bake, broil, and steam foods instead of frying them. · Eat a variety of fruit and vegetables every day. Dark green, deep orange, red, or yellow fruits and vegetables are especially good for you. Examples include spinach, carrots, peaches, and berries. · Foods high in fiber can reduce your cholesterol and provide important vitamins and minerals. High-fiber foods include whole-grain cereals and breads, oatmeal, beans, brown rice, citrus fruits, and apples. · Eat lean proteins.  Heart-healthy proteins include seafood, lean meats and poultry, eggs, beans, peas, nuts, seeds, and soy products. · Limit drinks and foods with added sugar. These include candy, desserts, and soda pop. Lifestyle changes  · If your doctor recommends it, get more exercise. Walking is a good choice. Bit by bit, increase the amount you walk every day. Try for at least 30 minutes on most days of the week. You also may want to swim, bike, or do other activities. · Do not smoke. If you need help quitting, talk to your doctor about stop-smoking programs and medicines. These can increase your chances of quitting for good. Quitting smoking may be the most important step you can take to protect your heart. It is never too late to quit. · Limit alcohol to 2 drinks a day for men and 1 drink a day for women. Too much alcohol can cause health problems. · Manage other health problems such as diabetes, high blood pressure, and high cholesterol. If you think you may have a problem with alcohol or drug use, talk to your doctor. Medicines  · Take your medicines exactly as prescribed. Call your doctor if you think you are having a problem with your medicine. · If your doctor recommends aspirin, take the amount directed each day. Make sure you take aspirin and not another kind of pain reliever, such as acetaminophen (Tylenol). When should you call for help? [id number] if you have symptoms of a heart attack. These may include:  · Chest pain or pressure, or a strange feeling in the chest.  · Sweating. · Shortness of breath. · Pain, pressure, or a strange feeling in the back, neck, jaw, or upper belly or in one or both shoulders or arms. · Lightheadedness or sudden weakness. · A fast or irregular heartbeat. After you call 911, the  may tell you to chew 1 adult-strength or 2 to 4 low-dose aspirin. Wait for an ambulance. Do not try to drive yourself. Watch closely for changes in your health, and be sure to contact your doctor if you have any problems. Where can you learn more?   Go to [email or web address] and sign in to your [email or web address] account. Enter Q158 in the [facility] box to learn more about \"A Healthy Heart: Care Instructions. \"     If you do not have an account, please click on the \"Sign Up Now\" link. Current as of: December 16, 2019               Content Version: 12.5  © 7836-1868 [employer]wise, [employer]orporated. Care instructions adapted under license by [facility] ([facility]). If you have questions about a medical condition or this instruction, always ask your healthcare professional. [address] any warranty or liability for your use of this information.

## 2020-08-27 NOTE — PROGRESS NOTES
bleeding issues reports. BP is well controlled on current regimen. PCP follows cholesterol. Subjective  Ainsley MALLORY denies exertional chest pain, shortness of breath, orthopnea, paroxysmal nocturnal dyspnea, syncope, presyncope, edema and fatigue. The patient denies numbness or weakness to suggest cerebrovascular accident or transient ischemic attack.  + hx chronic but stable angina. Decreased incident with increasing Imdur.  + intermittent irregular heart rate. João Nagy has the following history as recorded in [facility]:  Patient Active Problem List   Diagnosis Code    Coronary artery disease involving native coronary artery of native heart I25.10    Mixed hyperlipidemia E78.2    S/P ablation of atrial fibrillation Z98.890, Z86.79    History of amiodarone therapy Z92.29    Ex-smoker Z87.891    Chest pain R07.9    Paroxysmal atrial fibrillation (HCC) I48.0    Chronic combined systolic and diastolic heart failure (HCC) I50.42    Sick sinus syndrome (HCC) I49.5    Amaurosis fugax G45.3    Essential hypertension I10    Chronic anticoagulation Z79.01    Simple chronic bronchitis (HCC) J41.0    Type 2 diabetes mellitus without complication, without long-term current use of insulin (HCC) E11.9    Obesity (BMI 30.0-34. 9) E66.9    Hx of CABG Z95.1     Past Medical History:   Diagnosis Date    Atrial fibrillation (HCC)     Atrial flutter (HCC)     CAD (coronary artery disease)     CAD (coronary atherosclerotic disease)     Diabetes mellitus ([facility]     diet controlled    Ex-smoker     quit 2013 /smoked 50 yrs    History of amiodarone therapy     [facility]     [facility]      Hypertension     Hypokalemia     Hypotension     MI (myocardial infarction) ([address]     S/P CABG x 4 11/11/2013 6/27/13    Stroke ([facility] 08/22/2017    eye     Past Surgical History:   Procedure Laterality Date    ABLATION OF DYSRHYTHMIC FOCUS      CARDIAC CATHETERIZATION  6/27/13  MDL    EF 45%    CARDIAC SURGERY      CABG x 4    CATARACT REMOVAL WITH IMPLANT Bilateral     COLONOSCOPY N/A 2020    Dr Maryam Goldstein x 1, AP x 1, 5 yr recall    CORONARY ARTERY BYPASS GRAFT  2013     Emergency CABG x 4, LIMA-DIAG, SVG-LAD, SVG-OM, SVG-PDA, RT EVH, DR Elenita Carey    CYST INCISION AND DRAINAGE N/A     RECTAL    EYE SURGERY      EYE SURGERY      cataract sx and implants (both eyes)    OTHER SURGICAL HISTORY      heart ablation    RETROPHYARYNGEAL ABCESS INCISION/DRAIN      Abcess near rectum     Family History   Problem Relation Age of Onset    Stroke Father     Heart Disease Father     High Blood Pressure Father     High Cholesterol Father     Other Father         \"swelling on vein behind stomach\"    Heart Disease Other     Colon Cancer Neg Hx     Colon Polyps Neg Hx     Esophageal Cancer Neg Hx     Liver Cancer Neg Hx     Liver Disease Neg Hx     Rectal Cancer Neg Hx     Stomach Cancer Neg Hx      Social History     Tobacco Use    Smoking status: Former Smoker     Packs/day: 1.00     Years: 15.00     Pack years: 15.00     Types: Cigarettes     Last attempt to quit: 2013     Years since quittin.1    Smokeless tobacco: Never Used   Substance Use Topics    Alcohol use: No      Current Outpatient Medications   Medication Sig Dispense Refill    isosorbide mononitrate (IMDUR) 60 MG extended release tablet Take (1) tab AM and 1-1/2 tab PM 15 tablet 1    lisinopril (PRINIVIL;ZESTRIL) 2.5 MG tablet Take 1 tablet by mouth daily 30 tablet 3    ranolazine (RANEXA) 1000 MG extended release tablet Take 1 tablet by mouth 2 times daily 16 tablet 0    metoprolol tartrate (LOPRESSOR) 25 MG tablet Take 1 tablet by mouth 2 times daily 60 tablet 0    nitroGLYCERIN (NITROSTAT) 0.4 MG SL tablet up to max of 3 total doses.  If no relief after 1 dose, call 911. 25 tablet 5    potassium chloride (KLOR-CON) 20 MEQ packet Take 1/2 tablet daily      atorvastatin (LIPITOR) 80 MG tablet Take 1 tablet by mouth nightly (Patient taking differently: Take 80 mg by mouth every morning ) 90 tablet 3    ELIQUIS 5 MG TABS tablet TAKE 1 TABLET BY MOUTH 2 TIMES DAILY  3    furosemide (LASIX) 80 MG tablet Take 1 tablet by mouth daily 90 tablet 3    aspirin 81 MG tablet Take 81 mg by mouth daily      Cholecalciferol (VITAMIN D) 2000 UNITS CAPS capsule Take 1/2 tablet daily       No current facility-administered medications for this visit. Allergies: Amiodarone; Azithromycin; Histamine; Pcn [penicillins]; Penicillins; and Unable to assess    Review of Systems  Constitutional - no appetite change, or unexpected weight change. No fever, chills or diaphoresis. No significant change in activity level or new onset of fatigue. HEENT - no significant rhinorrhea or epistaxis. No tinnitus or significant hearing loss. Eyes - no sudden vision change or amaurosis. No corneal arcus, xantholasma, subconjunctival hemorrhage or discharge. Respiratory - no significant wheezing, stridor, apnea or cough. No dyspnea on exertion or shortness of air. Cardiovascular - no exertional chest pain to suggest myocardial ischemia. No orthopnea or PND. No occurrence of slow heart rate. No palpitations. No claudication. + intermittent irregular heart rate. + stable angina. Some improvement with increasing Imdur. [place] recently negative for definite ischemia. Gastrointestinal - no abdominal swelling or pain. No blood in stool. No severe constipation, diarrhea, nausea, or vomiting. Genitourinary - no dysuria, frequency, or urgency. No flank pain or hematuria. Musculoskeletal - no back pain or myalgia. No problems with gait. Extremities - no clubbing, cyanosis or extremity edema. Skin - no color change or rash. No pallor. No new surgical incision. Neurologic - no speech difficulty, facial asymmetry or lateralizing weakness. No seizures, presyncope or syncope. No significant dizziness.   Hematologic - no easy ECG > 48HR TO 21 DAY RCRD W/CONECT INTL RCRD (Zio Recording)   8. Irregular heart rate  GA EXT ECG > 48HR TO 21 DAY RCRD W/CONECT INTL RCRD (Zio Recording)     Data reviewed:  8/21/20 Lexiscan  Impression    Impression:    There is large inferolateral infarct with no clear ischemia, with a    calculated ejection fraction of 42 %. Suggest: Clinical correlation. No significant change from [facility]    study 10/2019. Signed by Dr Zora Holbrook on 8/21/2020 12:05 AM      Lab Results   Component Value Date    WBC 8.7 08/11/2020    HGB 14.1 08/11/2020    HCT 41.4 (L) 08/11/2020    MCV 95.0 (H) 08/11/2020     08/11/2020     Lab Results   Component Value Date     08/11/2020    K 3.6 08/11/2020     08/11/2020    CO2 24 08/11/2020    BUN 23 08/11/2020    CREATININE 0.9 08/11/2020    GLUCOSE 105 08/11/2020    CALCIUM 9.6 08/11/2020    PROT 6.5 (L) 08/11/2020    LABALBU 3.9 08/11/2020    BILITOT 0.6 08/11/2020    ALKPHOS 64 08/11/2020    AST 14 08/11/2020    ALT 8 08/11/2020    LABGLOM >60 08/11/2020    GFRAA >59 08/11/2020    GLOB 3.4 08/28/2016       Lab Results   Component Value Date    CHOL 126 (L) 03/20/2020    CHOL 122 (L) 01/25/2019    CHOL 120 (L) 12/27/2018     Lab Results   Component Value Date    TRIG 91 03/20/2020    TRIG 121 01/25/2019    TRIG 122 12/27/2018     Lab Results   Component Value Date    HDL 47 (L) 03/20/2020    HDL 41 (L) 01/25/2019    HDL 41 (L) 12/27/2018     Lab Results   Component Value Date    LDLCALC 61 03/20/2020    [address] 57 01/25/2019    LDLCALC 55 12/27/2018       CAD s/p CABG - recent Lexiscan no definite ischemia. Dr. Gi Quigley has reviewed last cath films. Increasing Imdur has helped angina. Plan to re assess in one month. Per Dr. Rozanna Cassette message:  Patent LIMA-DIAG and saphenous vein graft to right coronary artery. Diseased saphenous vein graft to a small LAD (does not reach apex).   Known occluded saphenous graft to obtuse marginal.  Ejection fraction down with inferior and apical hypokinesis with likely prior right coronary artery infarct. Can do another Lexiscan to see if any changes unless he is having more angina since ED visit. Do not expect saphenous vein graft to LAD to last and not a great target for PCI. \"    PAF - s/p ablation x 2 in past.  Reports recurrent irregular heart rate on occasion. 2 week Zio cardiac monitor placed. On Eliquis with no bleeding issues. Hyperlipidemia - on Lipitor 80 - LDL 61. Patient is compliant with medication regimen. BP Readings from Last 3 Encounters:   08/27/20 110/70   08/13/20 106/64   08/12/20 120/67    Pulse Readings from Last 3 Encounters:   08/27/20 60   08/13/20 58   08/12/20 70        Wt Readings from Last 3 Encounters:   08/27/20 223 lb (101.2 kg)   08/13/20 223 lb (101.2 kg)   08/11/20 220 lb (99.8 kg)     Plan  Previous cardiac history and records reviewed. Continue current medical management. Continue current dose of Imdur. 2 week Zio cardiac monitor placed. Continue other current medications as directed. Continue to follow up with primary care provider for non cardiac medical problems. Call the office with any problems, questions or concerns at [phone]. Cardiology follow up: one month. Educational included in patient instructions. Heart health. NTG sl.      BERNARDO Wallis

## 2020-09-16 ENCOUNTER — TELEPHONE (OUTPATIENT)
Dept: CARDIOLOGY | Age: 70
End: 2020-09-16

## 2020-09-16 NOTE — TELEPHONE ENCOUNTER
[facility] cardiac monitor report reviewed. Analysis time 10 days and 13 hours  Underlying rhythm normal sinus rhythm  Average heart rate 65 bpm  Minimum heart rate 51 bpm at 2:49 AM  Maximum heart rate 169 with 6 beat run of SVT. 38 SVT runs longest 6 beats in duration  No VT, pauses, heart block or atrial fibrillation. Frequent PACs and rare PVC  13 patient triggers associated rhythm normal sinus rhythm, PAC and PVC.

## 2020-09-24 ENCOUNTER — OFFICE VISIT (OUTPATIENT)
Dept: CARDIOLOGY | Age: 70
End: 2020-09-24
Payer: MEDICARE

## 2020-09-24 VITALS
SYSTOLIC BLOOD PRESSURE: 102 MMHG | HEIGHT: 72 IN | WEIGHT: 223 LBS | OXYGEN SATURATION: 97 % | BODY MASS INDEX: 30.2 KG/M2 | HEART RATE: 60 BPM | DIASTOLIC BLOOD PRESSURE: 60 MMHG

## 2020-09-24 PROBLEM — I47.1 PSVT (PAROXYSMAL SUPRAVENTRICULAR TACHYCARDIA) (HCC): Status: ACTIVE | Noted: 2020-09-24

## 2020-09-24 PROBLEM — I49.1 PAC (PREMATURE ATRIAL CONTRACTION): Status: ACTIVE | Noted: 2020-09-24

## 2020-09-24 PROBLEM — I47.10 PSVT (PAROXYSMAL SUPRAVENTRICULAR TACHYCARDIA): Status: ACTIVE | Noted: 2020-09-24

## 2020-09-24 PROCEDURE — 93000 ELECTROCARDIOGRAM COMPLETE: CPT | Performed by: NURSE PRACTITIONER

## 2020-09-24 PROCEDURE — 99214 OFFICE O/P EST MOD 30 MIN: CPT | Performed by: NURSE PRACTITIONER

## 2020-09-24 NOTE — PATIENT INSTRUCTIONS
New instructions for today:  Eliquis can increase your risk of bleeding. If you notice blood in urine or stool, bleeding gums, excessive bruising or cough productive of bloody sputum, notify the office. Information on this blood thinner has been included in your after visit summary. Patient Instructions:  Continue current medications as prescribed. Always keep a current medication list. Bring your medications to every office visit. Continue to follow up with primary care provider for non cardiac medical problems. Call the office with any problems, questions or concerns at [phone]. If you have been asked to keep a blood pressure log, do so for 2 weeks. Call the office to report readings to the triage nurse at [phone]. Follow up with cardiologist as scheduled. The following educational material has been included in this after visit summary for your review: Life simple 7. Heart health. Premature beat. Life simple 7  1) Manage blood pressure - high blood pressure is a major risk factor for heart disease and stroke. Keeping blood pressure in health range reduces strain on your heart, arteries and kidneys. Blood pressure goal is less than 130/80. 2) Control cholesterol - contributes to plaque, which can clog arteries and lead to heart disease and stroke. When you control your cholesterol you are giving your arteries their best chance to remain clear. It is recommended that you get cholesterol lab work done once a year. 3) Reduce blood sugar - most of the food we eat is turning into glucose or blood sugar that our body uses for energy. Over time, high levels of blood sugar can damage your heart, kidneys, eyes and nerves. 4) Get active - living an active life is one of the most rewarding gifts you can give yourself and those you love. Simply put, daily physical activity increases your length and quality of life. Strive to exercise 15 minutes most days of the week.   5)  Eat better - A canned soups, and other high-salt, high-fat, processed foods. · Read food labels and try to avoid saturated and trans fats. They increase your risk of heart disease by raising cholesterol levels. · Limit the amount of solid fat-butter, margarine, and shortening-you eat. Use olive, peanut, or canola oil when you cook. Bake, broil, and steam foods instead of frying them. · Eat a variety of fruit and vegetables every day. Dark green, deep orange, red, or yellow fruits and vegetables are especially good for you. Examples include spinach, carrots, peaches, and berries. · Foods high in fiber can reduce your cholesterol and provide important vitamins and minerals. High-fiber foods include whole-grain cereals and breads, oatmeal, beans, brown rice, citrus fruits, and apples. · Eat lean proteins. Heart-healthy proteins include seafood, lean meats and poultry, eggs, beans, peas, nuts, seeds, and soy products. · Limit drinks and foods with added sugar. These include candy, desserts, and soda pop. Lifestyle changes  · If your doctor recommends it, get more exercise. Walking is a good choice. Bit by bit, increase the amount you walk every day. Try for at least 30 minutes on most days of the week. You also may want to swim, bike, or do other activities. · Do not smoke. If you need help quitting, talk to your doctor about stop-smoking programs and medicines. These can increase your chances of quitting for good. Quitting smoking may be the most important step you can take to protect your heart. It is never too late to quit. · Limit alcohol to 2 drinks a day for men and 1 drink a day for women. Too much alcohol can cause health problems. · Manage other health problems such as diabetes, high blood pressure, and high cholesterol. If you think you may have a problem with alcohol or drug use, talk to your doctor. Medicines  · Take your medicines exactly as prescribed.  Call your doctor if you think you are having a problem with your medicine. · If your doctor recommends aspirin, take the amount directed each day. Make sure you take aspirin and not another kind of pain reliever, such as acetaminophen (Tylenol). When should you call for help? [id number] if you have symptoms of a heart attack. These may include:  · Chest pain or pressure, or a strange feeling in the chest.  · Sweating. · Shortness of breath. · Pain, pressure, or a strange feeling in the back, neck, jaw, or upper belly or in one or both shoulders or arms. · Lightheadedness or sudden weakness. · A fast or irregular heartbeat. After you call 911, the  may tell you to chew 1 adult-strength or 2 to 4 low-dose aspirin. Wait for an ambulance. Do not try to drive yourself. Watch closely for changes in your health, and be sure to contact your doctor if you have any problems. Where can you learn more? Go to https://Walkabout.WiWide. org and sign in to your [employer] account. Enter B548 in the 3TIER box to learn more about \"A Healthy Heart: Care Instructions. \"     If you do not have an account, please click on the \"Sign Up Now\" link. Current as of: December 16, 2019               Content Version: 12.5  © 7637-9107 [email or web address]. Care instructions adapted under license by [facility] ([facility]). If you have questions about a medical condition or this instruction, always ask your healthcare professional. [place] [address] any warranty or liability for your use of this information. Patient Education        Premature Heartbeat: Care Instructions  Your Care Instructions     A premature heartbeat happens when the heart beats earlier than it should. This briefly interrupts the heart's rhythm. You do not usually feel the early heartbeat, and the next beat is stronger. To many people, this feels like a skipped heartbeat or a flutter. This heartbeat is also called a premature ventricular contraction (PVC).   If you have no heart problems, premature heartbeats that happen once in a while are not a cause for concern. Most people have them at some time. They may happen more often if you drink a lot of caffeine or alcohol or are under stress. Usually, no cause for a premature heartbeat is found, and no treatment is needed. Some people may take medicine to prevent these heartbeats and to relieve symptoms. Follow-up care is a key part of your treatment and safety. Be sure to make and go to all appointments, and call your doctor if you are having problems. It's also a good idea to know your test results and keep a list of the medicines you take. How can you care for yourself at home? · Limit caffeine and other stimulants if they trigger premature heartbeats. · Reduce stress. Avoid people and places that make you feel anxious, if you can. Learn ways to reduce stress, such as biofeedback, guided imagery, and meditation. · Do not smoke or allow others to smoke around you. If you need help quitting, talk to your doctor about stop-smoking programs and medicines. These can increase your chances of quitting for good. · Get at least 30 minutes of exercise on most days of the week. Walking is a good choice. You also may want to do other activities, such as running, swimming, cycling, or playing tennis or team sports. · Eat heart-healthy foods. · Stay at a healthy weight. Lose weight if you need to. · Get enough sleep. Keep your room dark and quiet, and try to go to bed at the same time every night. · Limit alcohol to 2 drinks a day for men and 1 drink a day for women. Too much alcohol can cause health problems. If drinking alcohol causes more premature heartbeats, do not drink it. · If your doctor prescribes medicine, take it exactly as prescribed. Call your doctor if you think you are having a problem with your medicine. When should you call for help? [id number] anytime you think you may need emergency care.  For example, call if:  · You passed out (lost consciousness). Call your doctor now or seek immediate medical care if:  · You are dizzy or lightheaded, or you feel like you may faint. · You are short of breath. Watch closely for changes in your health, and be sure to contact your doctor if you have any problems. Where can you learn more? Go to https://chpepiceweb.[employer]. org and sign in to your [employer] account. Enter R881 in the Mtime box to learn more about \"Premature Heartbeat: Care Instructions. \"     If you do not have an account, please click on the \"Sign Up Now\" link. Current as of: December 16, 2019               Content Version: 12.5  © 3107-2638 [employer]. Care instructions adapted under license by [facility] ([facility]). If you have questions about a medical condition or this instruction, always ask your healthcare professional. [address] any warranty or liability for your use of this information.

## 2020-09-24 NOTE — PROGRESS NOTES
[facility], [place]. [address] [facility]  [phone] office  [phone] fax      OFFICE VISIT:  2020    Rashawn Barkley - : 1950  Reason For Visit:  Beka Germain is a 79 y.o. male who is here for 1 Month Follow-Up and Results (ZIO)    History:   Diagnosis Orders   1. Coronary artery disease involving native coronary artery of native heart, angina presence unspecified     2. Hx of CABG     3. Paroxysmal atrial fibrillation (HCC)     4. Chronic anticoagulation     5. S/P ablation of atrial fibrillation     6. Essential hypertension     7. Mixed hyperlipidemia       The patient presents today for cardiology follow up regarding Zio monitor results. Mr. Mary Ann Fletcher reports no incident of chest pain. He reports \"my blood pressure monitor shows an irregular heart rate almost every day now. \"  He is s/p ablatino x 2 in the past by Dr. Rosa Álvarez. A recent Zio cardiac monitor warn for 10 days showed underlying rhythm NSR with no AF. There were 38 SVT runs longest 6 beats and frequent PACs. The patient triggered the monitor for NSR, PAC and PVC. Home BP log runs around 423 systolic. No bleeding issue reported on Eliquis. He continues on Lopressor 25 mg BID. The patient denies symptoms to suggest myocardial ischemia or heart failure. The patient's PCP monitors cholesterol. Patient is having labs checked next week by [facility]. Subjective  Mary Ann Fletcher E denies exertional chest pain, shortness of breath, orthopnea, paroxysmal nocturnal dyspnea, syncope, presyncope, edema and fatigue. The patient denies numbness or weakness to suggest cerebrovascular accident or transient ischemic attack. + palpitations.     Rashawn Barkley has the following history as recorded in [facility]:  Patient Active Problem List   Diagnosis Code    Coronary artery disease involving native coronary artery of native heart I25.10    Mixed hyperlipidemia E78.2    S/P ablation of atrial fibrillation Z98.890, Z86.79    History of amiodarone therapy Z92.29    Ex-smoker Z87.891    Chest pain R07.9    Paroxysmal atrial fibrillation (HCC) I48.0    Chronic combined systolic and diastolic heart failure (HCC) I50.42    Sick sinus syndrome (HCC) I49.5    Amaurosis fugax G45.3    Essential hypertension I10    Chronic anticoagulation Z79.01    Simple chronic bronchitis (HCC) J41.0    Type 2 diabetes mellitus without complication, without long-term current use of insulin (HCC) E11.9    Obesity (BMI 30.0-34. 9) E66.9    Hx of CABG Z95.1     Past Medical History:   Diagnosis Date    Atrial fibrillation (HCC)     Atrial flutter (HCC)     CAD (coronary artery disease)     CAD (coronary atherosclerotic disease)     Diabetes mellitus ([facility]     diet controlled    Ex-smoker     quit 2013 /smoked 50 yrs    History of amiodarone therapy     [facility]lipidemia     [facility]      Hypertension     Hypokalemia     Hypotension     MI (myocardial infarction) ([facility]     S/P CABG x 4 11/11/2013 6/27/13    Stroke ([facility] 08/22/2017    eye     Past Surgical History:   Procedure Laterality Date    ABLATION OF DYSRHYTHMIC FOCUS      CARDIAC CATHETERIZATION  6/27/13  MDL    EF 45%    CARDIAC SURGERY      CABG x 4    CATARACT REMOVAL WITH IMPLANT Bilateral     COLONOSCOPY N/A 6/16/2020    Dr Laura Nissen x 1, AP x 1, 5 yr recall    CORONARY ARTERY BYPASS GRAFT  6/27/2013     Emergency CABG x 4, LIMA-DIAG, SVG-LAD, SVG-OM, SVG-PDA, RT EVH, DR DOLAN    CYST INCISION AND DRAINAGE N/A     RECTAL    EYE SURGERY      EYE SURGERY      cataract sx and implants (both eyes)    OTHER SURGICAL HISTORY      heart ablation    RETROPHYARYNGEAL ABCESS INCISION/DRAIN      Abcess near rectum     Family History   Problem Relation Age of Onset    Stroke Father     Heart Disease Father     High Blood Pressure Father     High Cholesterol Father     Other Father in activity level or new onset of fatigue. HEENT - no significant rhinorrhea or epistaxis. No tinnitus or significant hearing loss. Eyes - no sudden vision change or amaurosis. No corneal arcus, xantholasma, subconjunctival hemorrhage or discharge. Respiratory - no significant wheezing, stridor, apnea or cough. No dyspnea on exertion or shortness of air. Cardiovascular - no exertional chest pain to suggest myocardial ischemia. No orthopnea or PND. No occurrence of slow heart rate. .  No claudication. + daily palpitations. Gastrointestinal - no abdominal swelling or pain. No blood in stool. No severe constipation, diarrhea, nausea, or vomiting. Genitourinary - no dysuria, frequency, or urgency. No flank pain or hematuria. Musculoskeletal - no back pain or myalgia. No problems with gait. Extremities - no clubbing, cyanosis or extremity edema. Skin - no color change or rash. No pallor. No new surgical incision. Neurologic - no speech difficulty, facial asymmetry or lateralizing weakness. No seizures, presyncope or syncope. No significant dizziness. Hematologic - no easy bruising or excessive bleeding. Psychiatric - no severe anxiety or insomnia. No confusion. All other review of systems are negative. Objective  Vital Signs - /82   Pulse 60   Ht 6' (1.829 m)   Wt 223 lb (101.2 kg)   SpO2 97%   BMI 30.24 kg/m²   General - Chandan MALLORY is alert, cooperative, and pleasant. Well groomed. No acute distress. Body habitus - Body mass index is 30.24 kg/m². HEENT - Head is normocephalic. No circumoral cyanosis. Dentition is normal.  EYES -   Lids normal without ptosis. No discharge, edema or subconjunctival hemorrhage. Neck - Symmetrical without apparent mass or lymphadenopathy. Respiratory - Normal respiratory effort without use of accessory muscles. Ausculatation reveals vesicular breath sounds without crackles, wheezes, rub or rhonchi.     Cardiovascular - No jugular venous distention. Auscultation reveals irregular heart rate. No audible clicks, gallop or rub. No murmur. No lower extremity varicosities. No carotid bruits. Abdominal -  No visible distention, mass or pulsations. Extremities - No clubbing or cyanosis. No statis dermatitis or ulcers. No edema. Musculoskeletal -   No Osler's nodes. No kyphosis or scoliosis. Gait is even and regular without limp or shuffle. Ambulates without assistance. Skin -  Warm and dry; no rash or pallor. No new surgical wound. Neurological - No focal neurological deficits. Thought processes coherent. No apparent tremor. Oriented to person, place and time. Psychiatric -  Appropriate affect and mood. Assessment:     Diagnosis Orders   1. PAC (premature atrial contraction)     2. Coronary artery disease involving native coronary artery of native heart, angina presence unspecified  EKG 12 lead   3. Hx of CABG     4. Paroxysmal atrial fibrillation (HCC)  EKG 12 lead   5. Chronic anticoagulation     6. S/P ablation of atrial fibrillation     7. Essential hypertension     8. Mixed hyperlipidemia     9.  PSVT (paroxysmal supraventricular tachycardia) ([facility]       Data reviewed:  Prior Cardiac History -   06/27/2013 CABG X4 LIMA-diag, VG-LAD, VG-OM, VG-PDA Pancho Minaya)  11/10/2015  SE negative for myocardial ischemia  6/24/2016  TTE  Normal LVFX  7/2/2018 lexiscan Positive for inferior MI + myocardial ischemia, EF 50%, 12% ischemic myocardium on stress, intermediate risk findings, AUC indication 17, AUC score 7  7/11/18  Cath  Severe NVD, patent LIMA-LAD, patent yet extremely small VG-LAD, patent VG-RCA, closed VG-OM, anterior lateral akinesis, EF 35%  10/11/19  lexiscan Positive for inferior lateral myocardial ischemia, EF 51%, 1% ischemic myocardium on stress, low risk findings, AUC indication 15, AUC score 4, Jessica Le MD)   10/12/19  Cath  Severe NVD, patent LIMA-LAD, patent yet extremely small VG-LAD, patent VG-RCA, closed VG-OM, anterior lateral and inferior severe hypokinesis EF 30%    o cardiac monitor report reviewed. Analysis time 10 days and 13 hours  Underlying rhythm normal sinus rhythm  Average heart rate 65 bpm  Minimum heart rate 51 bpm at 2:49 AM  Maximum heart rate 169 with 6 beat run of SVT. 38 SVT runs longest 6 beats in duration  No VT, pauses, heart block or atrial fibrillation. Frequent PACs and rare PVC  13 patient triggers associated rhythm normal sinus rhythm, PAC and PVC.    8/19/20 Lexiscan  Impression    Impression:    There is large inferolateral infarct with no clear ischemia, with a    calculated ejection fraction of 42 %. Suggest: Clinical correlation. No significant change from [facility]    study 10/2019. Signed by Dr Rachelle Amador on 8/21/2020 12:05 AM        Lab Results   Component Value Date    WBC 8.7 08/11/2020    HGB 14.1 08/11/2020    HCT 41.4 (L) 08/11/2020    MCV 95.0 (H) 08/11/2020     08/11/2020     Lab Results   Component Value Date     08/11/2020    K 3.6 08/11/2020     08/11/2020    CO2 24 08/11/2020    BUN 23 08/11/2020    CREATININE 0.9 08/11/2020    GLUCOSE 105 08/11/2020    CALCIUM 9.6 08/11/2020    PROT 6.5 (L) 08/11/2020    LABALBU 3.9 08/11/2020    BILITOT 0.6 08/11/2020    ALKPHOS 64 08/11/2020    AST 14 08/11/2020    ALT 8 08/11/2020    LABGLOM >60 08/11/2020    GFRAA >59 08/11/2020    GLOB 3.4 08/28/2016       Lab Results   Component Value Date    CHOL 126 (L) 03/20/2020    CHOL 122 (L) 01/25/2019    CHOL 120 (L) 12/27/2018     Lab Results   Component Value Date    TRIG 91 03/20/2020    TRIG 121 01/25/2019    TRIG 122 12/27/2018     Lab Results   Component Value Date    HDL 47 (L) 03/20/2020    HDL 41 (L) 01/25/2019    HDL 41 (L) 12/27/2018     Lab Results   Component Value Date    LDLCALC 61 03/20/2020    LDLCALC 57 01/25/2019    LDLCALC 55 12/27/2018     EKG reviewed:  NSR 89 BPM with PACs. No acute ischemic changes.     CAD - stable on current medical management. HTN - BP running 929 systolic on current regimen. PAF - no AF on recent Zio monitor. Frequent PACs and 38 SVT runs longest 6 beats. Patient is s/p ablation x 2 by Dr. Jose León. Plan to contact Dr. Jose León and have him review Zio and make recommendation. Unable to titrate up Lopressor due to low BP. Patient having labs rechecked next week by [place]. Hyperlipidemia - on Lipitor 80 mg daily. LDL 61. Patient is compliant with medication regimen. BP Readings from Last 3 Encounters:   09/24/20 128/82   08/27/20 110/70   08/13/20 106/64    Pulse Readings from Last 3 Encounters:   09/24/20 60   08/27/20 60   08/13/20 58        Wt Readings from Last 3 Encounters:   09/24/20 223 lb (101.2 kg)   08/27/20 223 lb (101.2 kg)   08/13/20 223 lb (101.2 kg)       Recent Results (from the past 1008 hour(s))   NM MYOCARDIAL SPECT REST EXERCISE OR RX    Collection Time: 08/19/20 11:54 AM   Result Value Ref Range    Left Ventricular Ejection Fraction 42     LVEF MODALITY Nuclear      Plan  Previous cardiac history and records reviewed. Continue current medical management. Continue other current medications as directed. Continue to follow up with primary care provider for non cardiac medical problems. Call the office with any problems, questions or concerns at [phone]. Cardiology follow up: Dr. Estela Kauffman as scheduled. Educational included in patient instructions. Heart health. Premature beat.      BERNARDO Phan

## 2020-10-08 ENCOUNTER — TELEPHONE (OUTPATIENT)
Dept: CARDIOLOGY | Age: 70
End: 2020-10-08

## 2020-10-08 NOTE — TELEPHONE ENCOUNTER
Attempted to notify patient's EP, Dr. Ar Galvan, regarding Zio findings.   Voicemail mailbox full.    tlm

## 2020-10-16 ENCOUNTER — TELEPHONE (OUTPATIENT)
Dept: CARDIOLOGY | Age: 70
End: 2020-10-16

## 2020-10-19 NOTE — TELEPHONE ENCOUNTER
Prisca Salgado called Dr. Hirsch Brothers and he was in a case. He attempted to call me back and I missed his call. I will try again this week.   Thanks, [employer] [facility]

## 2020-11-04 ENCOUNTER — OFFICE VISIT (OUTPATIENT)
Dept: CARDIOLOGY | Age: 70
End: 2020-11-04
Payer: MEDICARE

## 2020-11-04 ENCOUNTER — HOSPITAL ENCOUNTER (OUTPATIENT)
Dept: NON INVASIVE DIAGNOSTICS | Age: 70
Discharge: HOME | End: 2020-11-04
Payer: MEDICARE

## 2020-11-04 VITALS
WEIGHT: 220 LBS | HEIGHT: 72 IN | DIASTOLIC BLOOD PRESSURE: 74 MMHG | HEART RATE: 110 BPM | SYSTOLIC BLOOD PRESSURE: 100 MMHG | BODY MASS INDEX: 29.8 KG/M2

## 2020-11-04 PROBLEM — R42 DIZZINESS: Status: ACTIVE | Noted: 2020-11-04

## 2020-11-04 PROCEDURE — 99214 OFFICE O/P EST MOD 30 MIN: CPT | Performed by: NURSE PRACTITIONER

## 2020-11-04 PROCEDURE — 93000 ELECTROCARDIOGRAM COMPLETE: CPT | Performed by: NURSE PRACTITIONER

## 2020-11-04 PROCEDURE — 93880 EXTRACRANIAL BILAT STUDY: CPT

## 2020-11-04 NOTE — PATIENT INSTRUCTIONS
New instructions for today:  Carotid Ultrasound   -  No prep. Takes approximately 30 minutes. [place] [facility] and [address] [facility] located on the first floor of [place] [address] through hospital main entrance and turn immediately to your left. Date/Time: 11/04/20  Carotid ultrasound is a safe, painless procedure that uses sound waves to examine the structure and function of the carotid arteries in the neck. You have two carotid arteries, one on each side of the neck, which deliver blood from the heart to the brain. Carotid ultrasound can reveal whether an artery has any blockage and how well blood flows through the artery. Carotid ultrasound is usually used to screen for blockages that indicate an increased risk of stroke. Results from a carotid ultrasound can help your doctor determine what kind of treatment you may need to lower your risk. If you need to change your appointment, please call outpatient scheduling at [phone]. Eliquis can increase your risk of bleeding. If you notice blood in urine or stool, bleeding gums, excessive bruising or cough productive of bloody sputum, notify the office. Information on this blood thinner has been included in your after visit summary. Patient Instructions:  Continue current medications as prescribed. Always keep a current medication list. Bring your medications to every office visit. Continue to follow up with primary care provider for non cardiac medical problems. Call the office with any problems, questions or concerns at [phone]. If you have been asked to keep a blood pressure log, do so for 2 weeks. Call the office to report readings to the triage nurse at [phone]. Follow up with cardiologist as scheduled. The following educational material has been included in this after visit summary for your review: Life simple 7. Heart health.      Life simple 7  1) Manage blood pressure - high blood pressure is a major risk factor for heart disease and stroke. Keeping blood pressure in health range reduces strain on your heart, arteries and kidneys. Blood pressure goal is less than 130/80. 2) Control cholesterol - contributes to plaque, which can clog arteries and lead to heart disease and stroke. When you control your cholesterol you are giving your arteries their best chance to remain clear. It is recommended that you get cholesterol lab work done once a year. 3) Reduce blood sugar - most of the food we eat is turning into glucose or blood sugar that our body uses for energy. Over time, high levels of blood sugar can damage your heart, kidneys, eyes and nerves. 4) Get active - living an active life is one of the most rewarding gifts you can give yourself and those you love. Simply put, daily physical activity increases your length and quality of life. Strive to exercise 15 minutes most days of the week. 5)  Eat better - A healthy diet is one of your best weapons for fighting cardiovascular disease. When you eat a heart healthy diet, you improve your chances for feeling good and staying healthy for life. 6)  Lose weight - when you shed extra fat an unnecessary pounds, you reduce the burden on your hear, lungs, blood vessels and skeleton. You give yourself the gift of active living, you lower your blood pressure and help yourself feel better. 7) Stop smoking - cigarette smokers have a higher risk of developing cardiovascular disease. If  You smoke, quitting is the best thing you can do for your health. Check American Heart Association on line for more information on Life's Simple 7 and tips for healthy living. A Healthy Heart: Care Instructions  Your Care Instructions     Coronary artery disease, also called heart disease, occurs when a substance called plaque builds up in the vessels that supply oxygen-rich blood to your heart muscle.  This can narrow the blood vessels and reduce blood flow. A heart attack happens when blood flow is completely blocked. A high-fat diet, smoking, and other factors increase the risk of heart disease. Your doctor has found that you have a chance of having heart disease. You can do lots of things to keep your heart healthy. It may not be easy, but you can change your diet, exercise more, and quit smoking. These steps really work to lower your chance of heart disease. Follow-up care is a key part of your treatment and safety. Be sure to make and go to all appointments, and call your doctor if you are having problems. It's also a good idea to know your test results and keep a list of the medicines you take. How can you care for yourself at home? Diet  · Use less salt when you cook and eat. This helps lower your blood pressure. Taste food before salting. Add only a little salt when you think you need it. With time, your taste buds will adjust to less salt. · Eat fewer snack items, fast foods, canned soups, and other high-salt, high-fat, processed foods. · Read food labels and try to avoid saturated and trans fats. They increase your risk of heart disease by raising cholesterol levels. · Limit the amount of solid fat-butter, margarine, and shortening-you eat. Use olive, peanut, or canola oil when you cook. Bake, broil, and steam foods instead of frying them. · Eat a variety of fruit and vegetables every day. Dark green, deep orange, red, or yellow fruits and vegetables are especially good for you. Examples include spinach, carrots, peaches, and berries. · Foods high in fiber can reduce your cholesterol and provide important vitamins and minerals. High-fiber foods include whole-grain cereals and breads, oatmeal, beans, brown rice, citrus fruits, and apples. · Eat lean proteins. Heart-healthy proteins include seafood, lean meats and poultry, eggs, beans, peas, nuts, seeds, and soy products. · Limit drinks and foods with added sugar.  These include candy, desserts, to learn more about \"A Healthy Heart: Care Instructions. \"     If you do not have an account, please click on the \"Sign Up Now\" link. Current as of: December 16, 2019               Content Version: 12.5  © 3414-9735 [employer]. Care instructions adapted under license by [facility] ([facility]). If you have questions about a medical condition or this instruction, always ask your healthcare professional. [place] [address] any warranty or liability for your use of this information. [place] at the [address] [place] [address] and [address] [facility] located on the first floor of [place] [address] through hospital main entrance and turn immediately to your left. Patient's contact number:  [phone] (home)     Date/Time:     Carotid Ultrasound   -  No prep. Takes approximately 30 minutes. Carotid ultrasound is a safe, painless procedure that uses sound waves to examine the structure and function of the carotid arteries in the neck. You have two carotid arteries, one on each side of the neck, which deliver blood from the heart to the brain. Carotid ultrasound can reveal whether an artery has any blockage and how well blood flows through the artery. Carotid ultrasound is usually used to screen for blockages that indicate an increased risk of stroke. Results from a carotid ultrasound can help your doctor determine what kind of treatment you may need to lower your risk. If you need to change your appointment, please call outpatient scheduling at [phone].

## 2020-11-04 NOTE — PROGRESS NOTES
[facility] [address] [place] [address]. [address], [address], [address] [facility]  [phone] office  [phone] fax      OFFICE VISIT:  2020    Sherwin Ayon - : 1950  Reason For Visit:  Azalea Poon is a 79 y.o. male who is here for Follow-up and Coronary Artery Disease    History:   Diagnosis Orders   1. Coronary artery disease involving native coronary artery of native heart, angina presence unspecified     2. Paroxysmal atrial fibrillation (HCC)  EKG 12 lead   3. Carotid artery calcification, bilateral  VL DUP CAROTID BILATERAL   4. Hx of CABG     5. S/P ablation of atrial fibrillation     6. Chronic anticoagulation     7. PAC (premature atrial contraction)     8. Dizziness       The patient presents today for cardiology follow up. Mr. Kolby Smith presents today with concerns over recent dental x-rays showing plaque in carotid arteries.  carotid ultrasound showed heterogenous plaque with less than 50% occlusive disease bilaterally. He notified PCP who was supposed to schedule a carotid ultrasound. However, patient has not heard back from that office as of yet. He denies stroke like symptoms. Reports dizziness, mostly with sudden change in position. He has a hx of PAF s/p ablation by Dr. Ervin Marcos. Recent Zio showed no AF with frequent PACs. Dr. Ervin Marcos was consulted recommending BB or CCB. The patient is currently taking Lopessor 25 mg BID. He reports not being bothered by the skipped beats. He reports no bleeding issue on Eliquis. The patient denies symptoms to suggest myocardial ischemia, heart failure or arrhythmia. BP is well controlled on current regimen. The patient's PCP monitors cholesterol. Subjective  Kolby Smith E denies exertional chest pain, shortness of breath, orthopnea, paroxysmal nocturnal dyspnea, syncope, presyncope,  edema and fatigue.   The patient denies numbness or weakness to suggest cerebrovascular accident or transient ischemic attack.  + dizziness with sudden change in position. + occasional palpitations. Lorena Duggan has the following history as recorded in [facility]:  Patient Active Problem List   Diagnosis Code    Coronary artery disease involving native coronary artery of native heart I25.10    Mixed hyperlipidemia E78.2    S/P ablation of atrial fibrillation Z98.890, Z86.79    History of amiodarone therapy Z92.29    Ex-smoker Z87.891    Chest pain R07.9    Paroxysmal atrial fibrillation (HCC) I48.0    Chronic combined systolic and diastolic heart failure (HCC) I50.42    Sick sinus syndrome (HCC) I49.5    Amaurosis fugax G45.3    Essential hypertension I10    Chronic anticoagulation Z79.01    Simple chronic bronchitis (HCC) J41.0    Type 2 diabetes mellitus without complication, without long-term current use of insulin (HCC) E11.9    Obesity (BMI 30.0-34. 9) E66.9    Hx of CABG Z95.1    PSVT ([facility]oxysmal [facility]ricular tachycardia) [facility] I47.1    PAC (premature atrial contraction) I49.1     Past Medical History:   Diagnosis Date    Atrial fibrillation (HCC)     Atrial flutter (HCC)     CAD (coronary artery disease)     CAD (coronary atherosclerotic disease)     Diabetes mellitus ([address]     diet controlled    Ex-smoker     quit 2013 /smoked 50 yrs    History of amiodarone therapy     [facility]     [facility]      Hypertension     Hypokalemia     Hypotension     MI (myocardial infarction) ([address]     S/P CABG x 4 11/11/2013 6/27/13    Stroke [facility] 08/22/2017    eye     Past Surgical History:   Procedure Laterality Date    ABLATION OF DYSRHYTHMIC FOCUS      CARDIAC CATHETERIZATION  6/27/13  MDL    EF 45%    CARDIAC SURGERY      CABG x 4    CATARACT REMOVAL WITH IMPLANT Bilateral     COLONOSCOPY N/A 6/16/2020    Dr Ria Centenoet x 1, AP x 1, 5 yr recall    CORONARY ARTERY BYPASS GRAFT  6/27/2013     Emergency CABG x 4, LIMA-DIAG, SVG-LAD, SVG-OM, SVG-PDA, RT EVH, DR Kristine Monzon    CYST INCISION AND DRAINAGE N/A     RECTAL    EYE SURGERY      EYE SURGERY      cataract sx and implants (both eyes)    OTHER SURGICAL HISTORY      heart ablation    RETROPHYARYNGEAL ABCESS INCISION/DRAIN      Abcess near rectum     Family History   Problem Relation Age of Onset    Stroke Father     Heart Disease Father     High Blood Pressure Father     High Cholesterol Father     Other Father         \"swelling on vein behind stomach\"    Heart Disease Other     Colon Cancer Neg Hx     Colon Polyps Neg Hx     Esophageal Cancer Neg Hx     Liver Cancer Neg Hx     Liver Disease Neg Hx     Rectal Cancer Neg Hx     Stomach Cancer Neg Hx      Social History     Tobacco Use    Smoking status: Former Smoker     Packs/day: 1.00     Years: 15.00     Pack years: 15.00     Types: Cigarettes     Last attempt to quit: 2013     Years since quittin.3    Smokeless tobacco: Never Used   Substance Use Topics    Alcohol use: No      Current Outpatient Medications   Medication Sig Dispense Refill    isosorbide mononitrate (IMDUR) 60 MG extended release tablet Take (1) tab AM and 1-1/2 tab  tablet 3    lisinopril (PRINIVIL;ZESTRIL) 2.5 MG tablet Take 1 tablet by mouth daily 30 tablet 3    ranolazine (RANEXA) 1000 MG extended release tablet Take 1 tablet by mouth 2 times daily 16 tablet 0    metoprolol tartrate (LOPRESSOR) 25 MG tablet Take 1 tablet by mouth 2 times daily 60 tablet 0    nitroGLYCERIN (NITROSTAT) 0.4 MG SL tablet up to max of 3 total doses.  If no relief after 1 dose, call 911. 25 tablet 5    potassium chloride (KLOR-CON) 20 MEQ packet Take 1/2 tablet daily      atorvastatin (LIPITOR) 80 MG tablet Take 1 tablet by mouth nightly (Patient taking differently: Take 80 mg by mouth every morning ) 90 tablet 3    ELIQUIS 5 MG TABS tablet TAKE 1 TABLET BY MOUTH 2 TIMES DAILY  3    furosemide (LASIX) 80 MG tablet Take 1 tablet by mouth daily 90 tablet 3    aspirin 81 MG tablet Take 81 mg by mouth daily      Cholecalciferol (VITAMIN D) 2000 UNITS CAPS capsule Take 1/2 tablet daily       No current facility-administered medications for this visit. Allergies: Amiodarone; Azithromycin; Histamine; Pcn [penicillins]; Penicillins; and Unable to assess    Review of Systems  Constitutional - no appetite change, or unexpected weight change. No fever, chills or diaphoresis. No significant change in activity level or new onset of fatigue. HEENT - no significant rhinorrhea or epistaxis. No tinnitus or significant hearing loss. Eyes - no sudden vision change or amaurosis. No corneal arcus, xantholasma, subconjunctival hemorrhage or discharge. Respiratory - no significant wheezing, stridor, apnea or cough. No dyspnea on exertion or shortness of air. Cardiovascular - no exertional chest pain to suggest myocardial ischemia. No orthopnea or PND. No sensation of sustained arrythmia. No occurrence of slow heart rate. No claudication. + occasional palpitations. Gastrointestinal - no abdominal swelling or pain. No blood in stool. No severe constipation, diarrhea, nausea, or vomiting. Genitourinary - no dysuria, frequency, or urgency. No flank pain or hematuria. Musculoskeletal - no back pain or myalgia. No problems with gait. Extremities - no clubbing, cyanosis or extremity edema. Skin - no color change or rash. No pallor. No new surgical incision. Neurologic - no speech difficulty, facial asymmetry or lateralizing weakness. No seizures, presyncope or syncope.  + dizziness with sudden change in position. Hematologic - no easy bruising or excessive bleeding. Psychiatric - no severe anxiety or insomnia. No confusion. All other review of systems are negative. Objective  Vital Signs - /74   Pulse 110   Ht 6' (1.829 m)   Wt 220 lb (99.8 kg)   BMI 29.84 kg/m²   General - Symone MALLORY is alert, cooperative, and pleasant. Well groomed. No acute distress. Body habitus - Body mass index is 29.84 kg/m². HEENT - Head is normocephalic. No circumoral cyanosis. Dentition is normal.  EYES -   Lids normal without ptosis. No discharge, edema or subconjunctival hemorrhage. Neck - Symmetrical without apparent mass or lymphadenopathy. Respiratory - Normal respiratory effort without use of accessory muscles. Ausculatation reveals vesicular breath sounds without crackles, wheezes, rub or rhonchi. Cardiovascular - No jugular venous distention. Auscultation reveals regular rate and rhythm. No audible clicks, gallop or rub. No murmur. No lower extremity varicosities. No carotid bruits. Abdominal -  No visible distention, mass or pulsations. Extremities - No clubbing or cyanosis. No statis dermatitis or ulcers. No edema. Musculoskeletal -   No Osler's nodes. No kyphosis or scoliosis. Gait is even and regular without limp or shuffle. Ambulates without assistance. Skin -  Warm and dry; no rash or pallor. No new surgical wound. Neurological - No focal neurological deficits. Thought processes coherent. No apparent tremor. Oriented to person, place and time. Psychiatric -  Appropriate affect and mood. Assessment:     Diagnosis Orders   1. Coronary artery disease involving native coronary artery of native heart, angina presence unspecified     2. Paroxysmal atrial fibrillation (HCC)  EKG 12 lead   3. Carotid artery calcification, bilateral  VL DUP CAROTID BILATERAL   4. Hx of CABG     5. S/P ablation of atrial fibrillation     6. Chronic anticoagulation     7. PAC (premature atrial contraction)     8. Dizziness     [id number] Score: 5  Disclaimer: Risk Score calculation is dependent on accuracy of patient problem list and past encounter diagnosis.     Data reviewed:  Prior Cardiac History -   06/27/2013 CABG X4 LIMA-diag, VG-LAD, VG-OM, VG-PDA Lydia Kevonenzo)  11/10/2015  SE negative for myocardial ischemia  6/24/2016  TTE  Normal LVFX  7/2/2018 08/11/2020    ALKPHOS 64 08/11/2020    AST 14 08/11/2020    ALT 8 08/11/2020    LABGLOM >60 08/11/2020    GFRAA >59 08/11/2020    GLOB 3.4 08/28/2016       Lab Results   Component Value Date    CHOL 126 (L) 03/20/2020    CHOL 122 (L) 01/25/2019    CHOL 120 (L) 12/27/2018     Lab Results   Component Value Date    TRIG 91 03/20/2020    TRIG 121 01/25/2019    TRIG 122 12/27/2018     Lab Results   Component Value Date    HDL 47 (L) 03/20/2020    HDL 41 (L) 01/25/2019    HDL 41 (L) 12/27/2018     Lab Results   Component Value Date    LDLCALC 61 03/20/2020    [address] 01/25/2019    LDLCALC 55 12/27/2018     EKG reviewed:  Excessive artifact - rate 110 - possible sinus with frequent PAC    CAD s/p CABG - stable on current medical management. Carotid artery calcifications - schedule carotid ultrasound.     PAF - no recurrent AF. No bleeding issue on Eliquis.     HTN - normotensive. BP runs low times.     Hyperlipidemia - LDL 61 on Lipitor 80 mg daily.     Patient is compliant with medication regimen. BP Readings from Last 3 Encounters:   11/04/20 100/74   09/24/20 102/60   08/27/20 110/70    Pulse Readings from Last 3 Encounters:   11/04/20 110   09/24/20 60   08/27/20 60        Wt Readings from Last 3 Encounters:   11/04/20 220 lb (99.8 kg)   09/24/20 223 lb (101.2 kg)   08/27/20 223 lb (101.2 kg)     Plan  Previous cardiac history and records reviewed. Continue current medical management. Carotid ultrasound scheduled. Continue other current medications as directed. Continue to follow up with primary care provider for non cardiac medical problems. Call the office with any problems, questions or concerns at [phone]. Cardiology follow up: as scheduled with Dr. Emerson Vallejo. Educational included in patient instructions. Heart health.      BERNARDO Hanks

## 2021-01-27 ENCOUNTER — OFFICE VISIT (OUTPATIENT)
Dept: CARDIOLOGY CLINIC | Age: 71
End: 2021-01-27
Payer: MEDICARE

## 2021-01-27 VITALS
HEART RATE: 58 BPM | HEIGHT: 72 IN | SYSTOLIC BLOOD PRESSURE: 110 MMHG | WEIGHT: 228 LBS | BODY MASS INDEX: 30.88 KG/M2 | DIASTOLIC BLOOD PRESSURE: 80 MMHG

## 2021-01-27 DIAGNOSIS — I25.5 ISCHEMIC CARDIOMYOPATHY: Primary | ICD-10-CM

## 2021-01-27 PROCEDURE — 99214 OFFICE O/P EST MOD 30 MIN: CPT | Performed by: INTERNAL MEDICINE

## 2021-01-27 ASSESSMENT — ENCOUNTER SYMPTOMS
DIARRHEA: 0
ABDOMINAL DISTENTION: 0
SHORTNESS OF BREATH: 0
BLOOD IN STOOL: 0
BACK PAIN: 0
COUGH: 0
WHEEZING: 0
ABDOMINAL PAIN: 0
VOMITING: 0

## 2021-01-27 NOTE — PROGRESS NOTES
[address] [facility]  Cardiology Office Note  [facility] [address] [address]  Phone: [phone]  Fax: [phone]                            Date:  1/27/2021  Patient: Chaz Mendez  Age:  79 y.o., 1950    Referral: Yan Cooper MD      PROBLEM LIST:    Patient Active Problem List    Diagnosis Date Noted    Dizziness 11/04/2020     Priority: Low    PSVT (paroxysmal supraventricular tachycardia) ([facility] 09/24/2020     Priority: Low    PAC (premature atrial contraction) 09/24/2020     Priority: Low    Hx of CABG 08/13/2020     Priority: Low    Type 2 diabetes mellitus without complication, without long-term current use of insulin ([facility] 03/19/2020     Priority: Low     Overview Note:     Diet-controlled per patient. Recheck hemoglobin A1c and other labs.  Obesity (BMI 30.0-34.9) 03/19/2020     Priority: Low     Overview Note:     Recommended at least 150 minutes of exercise per week and resistance training 2 days a week. Discussed healthy diet habits. Offered suggestions for calorie counting.  Simple chronic bronchitis ([facility] 06/17/2019     Priority: Low     Overview Note:     Patient is followed by pulmonology Dr. Misty ho, annual low-dose CT, patient states he does not have COPD and documentation supports this.       Essential hypertension 04/26/2018     Priority: Low     Overview Note:     Stable, continue lisinopril, Imdur, Lopressor      Chronic anticoagulation 04/26/2018     Priority: Low     Overview Note:     Eliquis      Amaurosis fugax 10/18/2017     Priority: Low    Chronic combined systolic and diastolic heart failure (HCC)      Priority: Low    Sick sinus syndrome (HCC)      Priority: Low    Paroxysmal atrial fibrillation (HCC)      Priority: Low    Chest pain 06/22/2016     Priority: Low    Ex-smoker      Priority: Low     Overview Note:     quit 2013 /smoked 50 yrs      S/P ablation of atrial fibrillation 11/11/2013     Priority: recurrent atrial fibrillation was noted. His beta-blocker was increased and this has helped. He feels his granddaughter who reportedly is a drug abuser was the main cause of his stress and since she left the house and lives with her mother he has had no further palpitations. No significant leg swelling. REVIEW OF SYSTEMS:  Review of Systems   Constitutional: Negative for activity change, diaphoresis and fatigue. HENT: Negative for hearing loss, nosebleeds and tinnitus. Eyes: Negative for visual disturbance. Respiratory: Negative for cough, shortness of breath and wheezing. Cardiovascular: Negative for chest pain, palpitations and leg swelling. Gastrointestinal: Negative for abdominal distention, abdominal pain, blood in stool, diarrhea and vomiting. Endocrine: Negative for cold intolerance, heat intolerance, polydipsia, polyphagia and polyuria. Genitourinary: Negative for difficulty urinating, flank pain and hematuria. Musculoskeletal: Negative for arthralgias, back pain, joint swelling and myalgias. Skin: Negative for pallor and rash. Neurological: Negative for dizziness, seizures, syncope and headaches. Psychiatric/Behavioral: Negative for behavioral problems and dysphoric mood. The patient is not nervous/anxious. Past Medical History:      Diagnosis Date    Atrial fibrillation ([address]     Atrial flutter (HCC)     CAD (coronary artery disease)     CAD (coronary atherosclerotic disease)     Diabetes mellitus ([address]     diet controlled    Ex-smoker     quit 2013 /smoked 50 yrs    History of amiodarone therapy     [facility]     [facility]      Hypertension     Hypokalemia     Hypotension     MI (myocardial infarction) ([address]     S/P CABG x 4 11/11/2013 6/27/13    Stroke ([address] 08/22/2017    eye       Past Surgical History:      Procedure Laterality Date    ABLATION OF DYSRHYTHMIC FOCUS      CARDIAC CATHETERIZATION  6/27/13  MDL    EF 45%    CARDIAC SURGERY      CABG x 4    CATARACT REMOVAL WITH IMPLANT Bilateral     COLONOSCOPY N/A 6/16/2020    Dr Loco Fears x 1, AP x 1, 5 yr recall    CORONARY ARTERY BYPASS GRAFT  6/27/2013     Emergency CABG x 4, LIMA-DIAG, SVG-LAD, SVG-OM, SVG-PDA, RT EVH, DR Turner Hidden    CYST INCISION AND DRAINAGE N/A     RECTAL    EYE SURGERY      EYE SURGERY      cataract sx and implants (both eyes)    OTHER SURGICAL HISTORY      heart ablation    RETROPHYARYNGEAL ABCESS INCISION/DRAIN      Abcess near rectum       Medications:  Current Outpatient Medications   Medication Sig Dispense Refill    isosorbide mononitrate (IMDUR) 60 MG extended release tablet Take (1) tab AM and 1-1/2 tab  tablet 3    lisinopril (PRINIVIL;ZESTRIL) 2.5 MG tablet Take 1 tablet by mouth daily 30 tablet 3    ranolazine (RANEXA) 1000 MG extended release tablet Take 1 tablet by mouth 2 times daily 16 tablet 0    metoprolol tartrate (LOPRESSOR) 25 MG tablet Take 1 tablet by mouth 2 times daily 60 tablet 0    nitroGLYCERIN (NITROSTAT) 0.4 MG SL tablet up to max of 3 total doses. If no relief after 1 dose, call 911. 25 tablet 5    potassium chloride (KLOR-CON) 20 MEQ packet Take 1/2 tablet daily      atorvastatin (LIPITOR) 80 MG tablet Take 1 tablet by mouth nightly (Patient taking differently: Take 80 mg by mouth every morning ) 90 tablet 3    ELIQUIS 5 MG TABS tablet TAKE 1 TABLET BY MOUTH 2 TIMES DAILY  3    furosemide (LASIX) 80 MG tablet Take 1 tablet by mouth daily 90 tablet 3    aspirin 81 MG tablet Take 81 mg by mouth daily      Cholecalciferol (VITAMIN D) 2000 UNITS CAPS capsule Take 1/2 tablet daily       No current facility-administered medications for this visit.         Allergies:  Amiodarone, Azithromycin, Histamine, Pcn [penicillins], Penicillins, and Unable to assess    Past Social History:  Social History     Socioeconomic History    Marital status:      Spouse name: Not on file    Number of children: Not on file    Years of education: Not on file    Highest education level: Not on file   Occupational History    Not on file   Social Needs    Financial resource strain: Not on file    Food insecurity     Worry: Not on file     Inability: Not on file    Transportation needs     Medical: Not on file     Non-medical: Not on file   Tobacco Use    Smoking status: Former Smoker     Packs/day: 1.00     Years: 15.00     Pack years: 15.00     Types: Cigarettes     Quit date: 2013     Years since quittin.5    Smokeless tobacco: Never Used   Substance and Sexual Activity    Alcohol use: No    Drug use: No    Sexual activity: Yes     Partners: Female   Lifestyle    Physical activity     Days per week: Not on file     Minutes per session: Not on file    Stress: Not on file   Relationships    Social connections     Talks on phone: Not on file     Gets together: Not on file     Attends Yazdanism service: Not on file     Active member of club or organization: Not on file     Attends meetings of clubs or organizations: Not on file     Relationship status: Not on file    Intimate partner violence     Fear of current or ex partner: Not on file     Emotionally abused: Not on file     Physically abused: Not on file     Forced sexual activity: Not on file   Other Topics Concern    Not on file   Social History Narrative    ** Merged History Encounter **            Family History:       Problem Relation Age of Onset    Stroke Father     Heart Disease Father     High Blood Pressure Father     High Cholesterol Father     Other Father         \"swelling on vein behind stomach\"    Heart Disease Other     Colon Cancer Neg Hx     Colon Polyps Neg Hx     Esophageal Cancer Neg Hx     Liver Cancer Neg Hx     Liver Disease Neg Hx     Rectal Cancer Neg Hx     Stomach Cancer Neg Hx          Physical Examination:  /80   Pulse 58   Ht 6' (1.829 m)   Wt 228 lb (103.4 kg)   BMI 30.92 kg/m²   Physical 03/20/2020    TRIG 91 03/20/2020     LIVER PROFILE:No results for input(s): AST, ALT, LABALBU in the last 72 hours. Imaging:          ASSESSMENT and PLAN:    66-year-old gentleman with past medical history of coronary artery disease, prior CABG 6/2013 with four-vessel grafting, infarcted inferior wall with ejection fraction 35%, patent LIMA to diagonal, attenuated SVG to small LAD, occluded SVG to OM, patent SVG to RCA with infarcted inferior wall, diabetes mellitus type 2, hypertension, paroxysmal atrial fibrillation with prior ablations x2, on Eliquis, here for follow-up evaluation. 1.  His episodes of angina have subsided with increase in Imdur dosing. At this time I will continue to manage him medically. If there are recurrent symptoms despite medical management consideration can be made for repeat evaluation. His SVG to LAD was attenuated and likely is the culprit. The LAD however is occluded in the midportion and is a small vessel distally. His LIMA to diagonal was patent. His RCA territory is infarcted. His circumflex is smaller in size. 2.  I have requested an echocardiogram.  If his ejection fraction is 35% or less consideration can be made for ICD placement. 3.  He will follow-up with me in 7 months. Orders:  Orders Placed This Encounter   Procedures    ECHO Complete 2D W Doppler W Color     No orders of the defined types were placed in this encounter. Return in about 7 months (around 8/27/2021). Electronically signed by Yovanny Ibarra MD on 1/27/2021 at 9:07 [address] [facility]      Thisdictation was generated by voice recognition computer software. Although all attempts are made to edit the dictation for accuracy, there may be errors in the transcription that are not intended.

## 2021-01-27 NOTE — PATIENT INSTRUCTIONS
[place] at the [facility] and [address] [facility] located on the first floor of [place] [address] through hospital main entrance and turn immediately to your left. Date/Time: Feb 3 9:30  Wednesday  Patient's contact number:  [phone] (home)     Echocardiogram -  No prep. Takes approximately 30 min. An echocardiogram uses sound waves to produce images of your heart. This commonly used test allows your doctor to see how your heart is beating and pumping blood. Your doctor can use the images from an echocardiogram to identify various abnormalities in the heart muscle and valves. This test has 2 parts:   Ø You will be asked to disrobe from the waist up and given a gown to wear. The technologist will then hook up an EKG monitor to you for the entire exam.   Ø You will then have an ultrasound of your heart (echocardiogram) to assess the heart muscle, heart valves and heart function. You may eat and take any medicines before the exam.     If you need to change your appointment, please call outpatient scheduling at [phone].

## 2021-02-03 ENCOUNTER — HOSPITAL ENCOUNTER (OUTPATIENT)
Dept: NON INVASIVE DIAGNOSTICS | Age: 71
Discharge: HOME | End: 2021-02-03
Payer: MEDICARE

## 2021-02-03 DIAGNOSIS — I25.5 ISCHEMIC CARDIOMYOPATHY: ICD-10-CM

## 2021-02-03 LAB
LV EF: 30 %
LVEF MODALITY: NORMAL

## 2021-02-03 PROCEDURE — 93306 TTE W/DOPPLER COMPLETE: CPT

## 2021-02-04 ENCOUNTER — TELEPHONE (OUTPATIENT)
Dept: CARDIOLOGY CLINIC | Age: 71
End: 2021-02-04

## 2021-02-04 NOTE — TELEPHONE ENCOUNTER
Called and spoke with patient, have ICD PLACEMENT scheduled for 02/23/2021 at 1100 with arrival of 0900. Patient is to be NPO after midnight. Patient instructed to arrive through front entrance of hospital and make immediate left. Patient advised they can have one person with them but they both must wear a mask. Patient advised may take morning medications with sip of water. Also advised patient must have COVID testing completed on 02/19/2021 anywhere from 800-1100 am at [facility]. Advised patient that they will be able to proceed with procedure as long as test results are negative. Patient made aware that if testing is not resulted evening prior to procedure that they may have to be rescheduled and possibly retested. Given instructions on where to go and to self quarantine between testing and procedure. Patient does not have IV dye allergy. Patient verbally understood. Called and spoke to Laura Sharp  in cath lab on 02/04/2021 and scheduled procedure.

## 2021-02-19 ENCOUNTER — OFFICE VISIT (OUTPATIENT)
Age: 71
End: 2021-02-19

## 2021-02-19 VITALS — OXYGEN SATURATION: 95 % | HEART RATE: 61 BPM | TEMPERATURE: 97.6 F

## 2021-02-19 DIAGNOSIS — Z11.59 SCREENING FOR VIRAL DISEASE: Primary | ICD-10-CM

## 2021-02-19 LAB — SARS-COV-2, PCR: NOT DETECTED

## 2021-02-19 PROCEDURE — 99999 PR OFFICE/OUTPT VISIT,PROCEDURE ONLY: CPT | Performed by: NURSE PRACTITIONER

## 2021-02-23 ENCOUNTER — APPOINTMENT (OUTPATIENT)
Dept: GENERAL RADIOLOGY | Age: 71
End: 2021-02-23
Attending: INTERNAL MEDICINE
Payer: MEDICARE

## 2021-02-23 ENCOUNTER — HOSPITAL ENCOUNTER (OUTPATIENT)
Dept: CARDIAC CATH/INVASIVE PROCEDURES | Age: 71
Setting detail: OBSERVATION
Discharge: HOME | End: 2021-02-24
Attending: INTERNAL MEDICINE | Admitting: INTERNAL MEDICINE
Payer: MEDICARE

## 2021-02-23 PROBLEM — I25.5 ISCHEMIC CARDIOMYOPATHY: Status: ACTIVE | Noted: 2021-02-23

## 2021-02-23 LAB
ANION GAP SERPL CALCULATED.3IONS-SCNC: 8 MMOL/L (ref 7–19)
BASOPHILS ABSOLUTE: 0 K/UL (ref 0–0.2)
BASOPHILS RELATIVE PERCENT: 0.6 % (ref 0–1)
BUN BLDV-MCNC: 15 MG/DL (ref 8–23)
CALCIUM SERPL-MCNC: 9.8 MG/DL (ref 8.8–10.2)
CHLORIDE BLD-SCNC: 103 MMOL/L (ref 98–111)
CO2: 28 MMOL/L (ref 22–29)
CREAT SERPL-MCNC: 0.8 MG/DL (ref 0.5–1.2)
EKG P AXIS: NORMAL DEGREES
EKG P-R INTERVAL: NORMAL MS
EKG Q-T INTERVAL: 446 MS
EKG QRS DURATION: 102 MS
EKG QTC CALCULATION (BAZETT): 438 MS
EKG T AXIS: 44 DEGREES
EOSINOPHILS ABSOLUTE: 0.1 K/UL (ref 0–0.6)
EOSINOPHILS RELATIVE PERCENT: 2 % (ref 0–5)
GFR AFRICAN AMERICAN: >59
GFR NON-AFRICAN AMERICAN: >60
GLUCOSE BLD-MCNC: 130 MG/DL (ref 74–109)
HCT VFR BLD CALC: 40.5 % (ref 42–52)
HEMOGLOBIN: 13.6 G/DL (ref 14–18)
IMMATURE GRANULOCYTES #: 0.1 K/UL
LYMPHOCYTES ABSOLUTE: 1.9 K/UL (ref 1.1–4.5)
LYMPHOCYTES RELATIVE PERCENT: 28.6 % (ref 20–40)
MCH RBC QN AUTO: 32.6 PG (ref 27–31)
MCHC RBC AUTO-ENTMCNC: 33.6 G/DL (ref 33–37)
MCV RBC AUTO: 97.1 FL (ref 80–94)
MONOCYTES ABSOLUTE: 0.6 K/UL (ref 0–0.9)
MONOCYTES RELATIVE PERCENT: 9 % (ref 0–10)
NEUTROPHILS ABSOLUTE: 3.8 K/UL (ref 1.5–7.5)
NEUTROPHILS RELATIVE PERCENT: 59 % (ref 50–65)
PDW BLD-RTO: 12.4 % (ref 11.5–14.5)
PLATELET # BLD: 288 K/UL (ref 130–400)
PMV BLD AUTO: 9.8 FL (ref 9.4–12.4)
POTASSIUM REFLEX MAGNESIUM: 4.3 MMOL/L (ref 3.5–5)
RBC # BLD: 4.17 M/UL (ref 4.7–6.1)
SODIUM BLD-SCNC: 139 MMOL/L (ref 136–145)
WBC # BLD: 6.5 K/UL (ref 4.8–10.8)

## 2021-02-23 PROCEDURE — 99153 MOD SED SAME PHYS/QHP EA: CPT

## 2021-02-23 PROCEDURE — 85025 COMPLETE CBC W/AUTO DIFF WBC: CPT

## 2021-02-23 PROCEDURE — 33249 INSJ/RPLCMT DEFIB W/LEAD(S): CPT | Performed by: INTERNAL MEDICINE

## 2021-02-23 PROCEDURE — 6370000000 HC RX 637 (ALT 250 FOR IP): Performed by: INTERNAL MEDICINE

## 2021-02-23 PROCEDURE — 71045 X-RAY EXAM CHEST 1 VIEW: CPT

## 2021-02-23 PROCEDURE — 2500000003 HC RX 250 WO HCPCS

## 2021-02-23 PROCEDURE — 99152 MOD SED SAME PHYS/QHP 5/>YRS: CPT | Performed by: INTERNAL MEDICINE

## 2021-02-23 PROCEDURE — G0378 HOSPITAL OBSERVATION PER HR: HCPCS

## 2021-02-23 PROCEDURE — 2580000003 HC RX 258: Performed by: INTERNAL MEDICINE

## 2021-02-23 PROCEDURE — 93005 ELECTROCARDIOGRAM TRACING: CPT | Performed by: INTERNAL MEDICINE

## 2021-02-23 PROCEDURE — 80048 BASIC METABOLIC PNL TOTAL CA: CPT

## 2021-02-23 PROCEDURE — 6360000002 HC RX W HCPCS: Performed by: INTERNAL MEDICINE

## 2021-02-23 PROCEDURE — 71046 X-RAY EXAM CHEST 2 VIEWS: CPT

## 2021-02-23 PROCEDURE — 99152 MOD SED SAME PHYS/QHP 5/>YRS: CPT

## 2021-02-23 PROCEDURE — 36415 COLL VENOUS BLD VENIPUNCTURE: CPT

## 2021-02-23 PROCEDURE — 6360000002 HC RX W HCPCS

## 2021-02-23 PROCEDURE — 93010 ELECTROCARDIOGRAM REPORT: CPT | Performed by: INTERNAL MEDICINE

## 2021-02-23 PROCEDURE — 2780000010 HC IMPLANT OTHER

## 2021-02-23 PROCEDURE — 33249 INSJ/RPLCMT DEFIB W/LEAD(S): CPT

## 2021-02-23 PROCEDURE — C1898 LEAD, PMKR, OTHER THAN TRANS: HCPCS

## 2021-02-23 PROCEDURE — C1895 LEAD, AICD, ENDO DUAL COIL: HCPCS

## 2021-02-23 PROCEDURE — 2709999900 HC NON-CHARGEABLE SUPPLY

## 2021-02-23 PROCEDURE — C1721 AICD, DUAL CHAMBER: HCPCS

## 2021-02-23 RX ORDER — ASPIRIN 81 MG/1
81 TABLET, CHEWABLE ORAL DAILY
Status: DISCONTINUED | OUTPATIENT
Start: 2021-02-24 | End: 2021-02-24 | Stop reason: HOSPADM

## 2021-02-23 RX ORDER — LISINOPRIL 2.5 MG/1
2.5 TABLET ORAL DAILY
Status: DISCONTINUED | OUTPATIENT
Start: 2021-02-24 | End: 2021-02-24 | Stop reason: HOSPADM

## 2021-02-23 RX ORDER — RANOLAZINE 500 MG/1
1000 TABLET, EXTENDED RELEASE ORAL 2 TIMES DAILY
Status: DISCONTINUED | OUTPATIENT
Start: 2021-02-23 | End: 2021-02-24 | Stop reason: HOSPADM

## 2021-02-23 RX ORDER — SODIUM CHLORIDE 9 MG/ML
INJECTION, SOLUTION INTRAVENOUS CONTINUOUS
Status: DISCONTINUED | OUTPATIENT
Start: 2021-02-23 | End: 2021-02-24 | Stop reason: HOSPADM

## 2021-02-23 RX ORDER — SODIUM CHLORIDE 0.9 % (FLUSH) 0.9 %
10 SYRINGE (ML) INJECTION PRN
Status: DISCONTINUED | OUTPATIENT
Start: 2021-02-23 | End: 2021-02-24 | Stop reason: HOSPADM

## 2021-02-23 RX ORDER — SODIUM CHLORIDE 0.9 % (FLUSH) 0.9 %
10 SYRINGE (ML) INJECTION EVERY 12 HOURS SCHEDULED
Status: DISCONTINUED | OUTPATIENT
Start: 2021-02-23 | End: 2021-02-24 | Stop reason: HOSPADM

## 2021-02-23 RX ORDER — ISOSORBIDE MONONITRATE 60 MG/1
60 TABLET, EXTENDED RELEASE ORAL 2 TIMES DAILY
Status: DISCONTINUED | OUTPATIENT
Start: 2021-02-23 | End: 2021-02-24 | Stop reason: HOSPADM

## 2021-02-23 RX ORDER — POTASSIUM CHLORIDE 750 MG/1
10 TABLET, EXTENDED RELEASE ORAL DAILY
Status: DISCONTINUED | OUTPATIENT
Start: 2021-02-23 | End: 2021-02-24 | Stop reason: HOSPADM

## 2021-02-23 RX ORDER — ATORVASTATIN CALCIUM 80 MG/1
80 TABLET, FILM COATED ORAL NIGHTLY
Status: DISCONTINUED | OUTPATIENT
Start: 2021-02-23 | End: 2021-02-24 | Stop reason: HOSPADM

## 2021-02-23 RX ORDER — METOPROLOL TARTRATE 50 MG/1
25 TABLET, FILM COATED ORAL 2 TIMES DAILY
Status: DISCONTINUED | OUTPATIENT
Start: 2021-02-23 | End: 2021-02-24 | Stop reason: HOSPADM

## 2021-02-23 RX ORDER — POTASSIUM CHLORIDE 1.5 G/1.77G
10 POWDER, FOR SOLUTION ORAL DAILY
Status: DISCONTINUED | OUTPATIENT
Start: 2021-02-23 | End: 2021-02-23

## 2021-02-23 RX ORDER — FUROSEMIDE 80 MG
80 TABLET ORAL DAILY
Status: DISCONTINUED | OUTPATIENT
Start: 2021-02-23 | End: 2021-02-24 | Stop reason: HOSPADM

## 2021-02-23 RX ORDER — CHLORHEXIDINE GLUCONATE 4 G/100ML
SOLUTION TOPICAL ONCE
Status: COMPLETED | OUTPATIENT
Start: 2021-02-23 | End: 2021-02-23

## 2021-02-23 RX ORDER — NITROGLYCERIN 0.4 MG/1
0.4 TABLET SUBLINGUAL EVERY 5 MIN PRN
Status: DISCONTINUED | OUTPATIENT
Start: 2021-02-23 | End: 2021-02-24 | Stop reason: HOSPADM

## 2021-02-23 RX ADMIN — SODIUM CHLORIDE, PRESERVATIVE FREE 10 ML: 5 INJECTION INTRAVENOUS at 20:46

## 2021-02-23 RX ADMIN — ATORVASTATIN CALCIUM 80 MG: 80 TABLET, FILM COATED ORAL at 20:45

## 2021-02-23 RX ADMIN — Medication 1000 MG: at 10:39

## 2021-02-23 RX ADMIN — SODIUM CHLORIDE: 9 INJECTION, SOLUTION INTRAVENOUS at 10:18

## 2021-02-23 RX ADMIN — RANOLAZINE 1000 MG: 500 TABLET, FILM COATED, EXTENDED RELEASE ORAL at 20:45

## 2021-02-23 RX ADMIN — VANCOMYCIN HYDROCHLORIDE 1500 MG: 10 INJECTION, POWDER, LYOPHILIZED, FOR SOLUTION INTRAVENOUS at 20:45

## 2021-02-23 RX ADMIN — CHLORHEXIDINE GLUCONATE: 213 SOLUTION TOPICAL at 10:18

## 2021-02-23 RX ADMIN — METOPROLOL TARTRATE 25 MG: 50 TABLET, FILM COATED ORAL at 20:45

## 2021-02-23 RX ADMIN — ISOSORBIDE MONONITRATE 60 MG: 60 TABLET, EXTENDED RELEASE ORAL at 20:45

## 2021-02-23 NOTE — H&P
[id number] [facility]  Cardiology Office Note  [facility] [place]  [address]  Phone: [phone]  Fax: [phone]                              Date:                1/27/2021  Patient:            Toma Rosenberg  Age:                 79 y.o., 1950     Referral: Celeste Collins MD        PROBLEM LIST:           Patient Active Problem List     Diagnosis Date Noted    Dizziness 11/04/2020       Priority: Low    PSVT (paroxysmal supraventricular tachycardia) ([facility] 09/24/2020       Priority: Low    PAC (premature atrial contraction) 09/24/2020       Priority: Low    Hx of CABG 08/13/2020       Priority: Low    Type 2 diabetes mellitus without complication, without long-term current use of insulin ([facility] 03/19/2020       Priority: Low       Overview Note:       Diet-controlled per patient. Recheck hemoglobin A1c and other labs.       Obesity (BMI 30.0-34.9) 03/19/2020       Priority: Low       Overview Note:       Recommended at least 150 minutes of exercise per week and resistance training 2 days a week. Discussed healthy diet habits.  Offered suggestions for calorie counting.          Simple chronic bronchitis ([facility] [address] 06/17/2019       Priority: Low       Overview Note:       Patient is followed by pulmonology Dr. Erik Canela, annual low-dose CT, patient states he does not have COPD and documentation supports this.       Essential hypertension 04/26/2018       Priority: Low       Overview Note:       Stable, continue lisinopril, Imdur, Lopressor       Chronic anticoagulation 04/26/2018       Priority: Low       Overview Note:       Eliquis       Amaurosis fugax 10/18/2017       Priority: Low    Chronic combined systolic and diastolic heart failure (HCC)         Priority: Low    Sick sinus syndrome [facility])         Priority: Low    Paroxysmal atrial fibrillation [facility])         Priority: Low    Chest pain 06/22/2016       Priority: Low    Ex-smoker         Priority: Low     Overview Note:       quit 2013 /smoked 50 yrs       S/P ablation of atrial fibrillation 11/11/2013       Priority: Low       Overview Note:       6/27/13       History of amiodarone therapy         Priority: Low    Mixed hyperlipidemia         Priority: Low       Overview Note:       Check lipids       Coronary artery disease involving native coronary artery of native heart         Priority: Low       Overview Note:       06/27/2013 CABG X4 LIMA-diag, VG-LAD, VG-OM, VG-PDA [facility])  11/10/2015  SE negative for myocardial ischemia  6/24/2016  TTE  Normal LVFX  7/2/2018 lexiscan Positive for inferior MI + myocardial ischemia, EF 50%, 12% ischemic myocardium on stress, intermediate risk findings, AUC indication 17, AUC score 7  7/11/18  Cath  Severe NVD, patent LIMA-LAD, patent yet extremely small VG-LAD, patent VG-RCA, closed VG-OM, anterior lateral akinesis, EF 35%  10/11/19  lexiscan Positive for inferior lateral myocardial ischemia, EF 51%, 1% ischemic myocardium on stress, low risk findings, AUC indication 15, AUC score 4, Padmini Grijalva MD)   10/12/19                1. Coronary artery disease status post CABG 6/27/2013 with four-vessel grafting, LIMA to diagonal (patent), SVG to LAD (attenuated with small LAD) SVG to OM (occluded), SVG to RCA (patent), catheterization 10/12/2019 with occluded mid LAD, stenotic mid RCA, large inferior infarct, ejection fraction 35%. 2.  Paroxysmal atrial fibrillation status post prior ablations x2, on Eliquis. 3.  Diabetes mellitus type 2.  4.  Hypertension. 5.  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     PRESENTATION: Yeny Umana is a 79y.o. year old male presents for follow-up evaluation. He had been reporting episodes of chest pain requiring frequent use of nitroglycerin. His Sel[place] study had shown only a large inferior infarct with EF of 42%. At that time he was managed medically with increasing his Imdur.   Since then he has had no further chest pain and has not used nitroglycerin in the last 3 weeks. He was also having significant palpitations for which a ZIO monitor was done. No recurrent atrial fibrillation was noted. His beta-blocker was increased and this has helped. He feels his granddaughter who reportedly is a drug abuser was the main cause of his stress and since she left the house and lives with her mother he has had no further palpitations. No significant leg swelling.     REVIEW OF SYSTEMS:  Review of Systems   Constitutional: Negative for activity change, diaphoresis and fatigue. HENT: Negative for hearing loss, nosebleeds and tinnitus. Eyes: Negative for visual disturbance. Respiratory: Negative for cough, shortness of breath and wheezing. Cardiovascular: Negative for chest pain, palpitations and leg swelling. Gastrointestinal: Negative for abdominal distention, abdominal pain, blood in stool, diarrhea and vomiting. Endocrine: Negative for cold intolerance, heat intolerance, polydipsia, polyphagia and polyuria. Genitourinary: Negative for difficulty urinating, flank pain and hematuria. Musculoskeletal: Negative for arthralgias, back pain, joint swelling and myalgias. Skin: Negative for pallor and rash. Neurological: Negative for dizziness, seizures, syncope and headaches. Psychiatric/Behavioral: Negative for behavioral problems and dysphoric mood. The patient is not nervous/anxious.          Past Medical History:  Past Medical History             Diagnosis Date    Atrial fibrillation ([facility]      Atrial flutter ([facility] [facility]      CAD (coronary artery disease)      CAD (coronary atherosclerotic disease)      Diabetes mellitus ([facility]       diet controlled    Ex-smoker       quit 2013 /smoked 50 yrs    History of amiodarone therapy      Hyperlipidemia       [facility]      Hypertension      Hypokalemia      Hypotension      MI (myocardial infarction) (HCC)      S/P CABG x 4 11/11/2013   6/27/13    Stroke ([facility] [address] 08/22/2017     eye            Past Surgical History:  Past Surgical History             Procedure Laterality Date    ABLATION OF DYSRHYTHMIC FOCUS        CARDIAC CATHETERIZATION   6/27/13  MDL     EF 45%    CARDIAC SURGERY         CABG x 4    CATARACT REMOVAL WITH IMPLANT Bilateral      COLONOSCOPY N/A 6/16/2020     Dr Epstein Deal x 1, AP x 1, 5 yr recall    CORONARY ARTERY BYPASS GRAFT   6/27/2013      Emergency CABG x 4, LIMA-DIAG, SVG-LAD, SVG-OM, SVG-PDA, RT EVH, DR DOLAN    CYST INCISION AND DRAINAGE N/A       RECTAL    EYE SURGERY        EYE SURGERY         cataract sx and implants (both eyes)    OTHER SURGICAL HISTORY         heart ablation    RETROPHYARYNGEAL ABCESS INCISION/DRAIN         Abcess near rectum            Medications:  Current Facility-Administered Medications          Current Outpatient Medications   Medication Sig Dispense Refill    isosorbide mononitrate (IMDUR) 60 MG extended release tablet Take (1) tab AM and 1-1/2 tab  tablet 3    lisinopril (PRINIVIL;ZESTRIL) 2.5 MG tablet Take 1 tablet by mouth daily 30 tablet 3    ranolazine (RANEXA) 1000 MG extended release tablet Take 1 tablet by mouth 2 times daily 16 tablet 0    metoprolol tartrate (LOPRESSOR) 25 MG tablet Take 1 tablet by mouth 2 times daily 60 tablet 0    nitroGLYCERIN (NITROSTAT) 0.4 MG SL tablet up to max of 3 total doses.  If no relief after 1 dose, call 911. 25 tablet 5    potassium chloride (KLOR-CON) 20 MEQ packet Take 1/2 tablet daily        atorvastatin (LIPITOR) 80 MG tablet Take 1 tablet by mouth nightly (Patient taking differently: Take 80 mg by mouth every morning ) 90 tablet 3    ELIQUIS 5 MG TABS tablet TAKE 1 TABLET BY MOUTH 2 TIMES DAILY   3    furosemide (LASIX) 80 MG tablet Take 1 tablet by mouth daily 90 tablet 3    aspirin 81 MG tablet Take 81 mg by mouth daily        Cholecalciferol (VITAMIN D) 2000 UNITS CAPS capsule Take 1/2 tablet daily          No current facility-administered medications for this visit.             Allergies:  Amiodarone, Azithromycin, Histamine, Pcn [penicillins], Penicillins, and Unable to assess     Past Social History:  Social History               Socioeconomic History    Marital status:        Spouse name: Not on file    Number of children: Not on file    Years of education: Not on file    Highest education level: Not on file   Occupational History    Not on file   Social Needs    Financial resource strain: Not on file    Food insecurity       Worry: Not on file       Inability: Not on file    Transportation needs       Medical: Not on file       Non-medical: Not on file   Tobacco Use    Smoking status: Former Smoker       Packs/day: 1.00       Years: 15.00       Pack years: 15.00       Types: Cigarettes       Quit date: 2013       Years since quittin.5    Smokeless tobacco: Never Used   Substance and Sexual Activity    Alcohol use: No    Drug use: No    Sexual activity: Yes       Partners: Female   Lifestyle    Physical activity       Days per week: Not on file       Minutes per session: Not on file    Stress: Not on file   Relationships    Social connections       Talks on phone: Not on file       Gets together: Not on file       Attends Spiritism service: Not on file       Active member of club or organization: Not on file       Attends meetings of clubs or organizations: Not on file       Relationship status: Not on file    Intimate partner violence       Fear of current or ex partner: Not on file       Emotionally abused: Not on file       Physically abused: Not on file       Forced sexual activity: Not on file   Other Topics Concern    Not on file   Social History Narrative     ** Merged History Encounter **                  Family History:   Family History             Problem Relation Age of Onset    Stroke Father      Heart Disease Father      High Blood Pressure Father      High 8/27/2021).       Electronically signed by Hezzie Ahumada, MD on 1/27/2021 at 9:07 AM  [facility]        Thisdictation was generated by voice recognition computer software. Although all attempts are made to edit the dictation for accuracy, there may be errors in the transcription that are not intended.       Indications:Cardiomyopathy, ischemic.      Conclusions      Summary   Left ventricular chamber size is mildly dilated . Mild concentric left ventricular hypertrophy. Left ventricular systolic function is severely reduced   Left ventricular ejection fraction is visually estimated at 30%. Grade 3 LV diastolic dysfunction. Mild right atrial enlargement. Mildly dilated right ventricle. Right ventricle global systolic function is moderately reduced . Signature      ----------------------------------------------------------------   Electronically signed by Lin Bolivar DO(Interpreting   physician) on 02/03/2021 01:16 PM   ----------------------------------------------------------------     Addendum: 2/23/2021  Echocardiogram with ejection fraction remaining below 35% reported at 30%. We will proceed with ICD placement for primary prevention. Risks, benefits, alternatives of dual-chamber ICD discussed with the patient and full informed consent obtained.   Acceptable Mallampati score  Consent for moderate conscious sedation  ASA 3

## 2021-02-23 NOTE — LETTER
[facility]  Cardiac Rehab Department  [address] [place]  [phone]  Toll Free [phone]              February 25, 2021    Dear Krista England,    Please find enclosed information concerning the care of your internal cardiac defibrillator insertion site and the guidelines you should follow for the next four weeks of your recovery. Each of these recommendations apply unless you were specifically instructed otherwise by your cardiologist.    If you have not already received one, a transtelephonic patient transmitter has been ordered for you on your behalf. When in your possession and appropriate, unbox the transmitter, plug it in and complete your first defibrillator check by following the instructional pictures in the easy-to-follow pamphlet or on the transmitter itself. In the event you have a 'smartphone', an davina may have been downloaded on your phone during your hospitilization to allow you to use your phone for the same purpose. If any questions or concerns arise or you experience difficulty completing the steps stated above, you should contact your cardiologist's office for assistance. Rest assured that the use of your transmitter will be reviewed with you as needed during your upcoming follow-up visit in Dr. Chaparrita Jade office or via teleconference. Thank you. To Your [facility] Cardiac Rehab Staff    AMINA Morataya, MA  Registered Nurse  Exercise Physiologist

## 2021-02-23 NOTE — OP NOTE
Operative Note      Patient: Kecia Calixto  YOB: 1950  MRN: [id number]    Date of Procedure: 2/23/2021    Pre-Op Diagnosis: Ischemic cardiomyopathy, ejection fraction 30%    Post-Op Diagnosis: Same       Procedure: Dual-chamber ICD placement    : Kaiser Saul MD    Anesthesia: Moderate conscious sedation  Anesthesia: Lidocaine LA  Sedation: Versed 2 mg; Fentanyl 50 mg  Start time: 11:23 AM  Stop time: 1:05 PM  ASA Class: 3  Estimated blood loss: Less than 20 mL  An independent trained observer pushed medications at my direction. The patient was monitored continuously with the ECG, pulse oximetry, blood pressure monitoring, and direct observation for level of consciousness. Complications: None    Detailed Description of Procedure:   After obtaining informed written consent, the patient was brought to the catheterization laboratory where the left chest area was prepared in the usual sterile fashion. The patient was monitored continuously with ECG, pulse oximetry, blood pressure monitoring and direct observation. Additionally, please review the \"[facility] Hemodynamic Procedure Report\", which is generated electronically through the orangutrans. This report includes additional details regarding this procedure including, but not limited to:     1. Patient Data,   2. Admission,   3. Procedure,   4. Hemodynamics,   5. Vital Signs,   6. Medications, including conscious sedation medications given during the procedure (as note above),   7. Procedure Log,   8. Device Usage,   9. Signature Audit [place], and,   10. Signatures. It should be noted, that I sign the \"Signatures\" line electronically through the \"HPF Def Portals\" tab on my computer. The 's hands were scrubbed in a betadine solution for 5 minutes. Moderate conscious sedation was administered at my direction. Utilizing local anesthesia and a percutaneous technique, a single puncture was made in the left subclavian vein. Utilizing a combination of sharp and blunt dissection, a pacemaker pocket was created. A second puncture was then made in the left subclavian vein. The right ventricular ICD was inserted without difficulty and appropriate thresholds were obtained. The right atrial lead was secured with good thresholds but prolapsed down and was difficult to maintain likely from prior right atrial appendage ligation from prior surgery with possible scar tissue. The right atrial lead was then secured in a lateral position with good thresholds, impedances and appeared to be stable. The lead anchors were secured to the floor of the ICD pocket. The ICD pocket was copiously irrigated with antibiotic solution. The leads were attached to the generator. An antibiotic pouch was placed around the generator and leads and placed in the pocket. The subcutaneous and cutaneous tissues were approximated, and a sterile dressing applied. He was then taken to his room in stable and satisfactory condition. Complications:  none    Technical Information:       Model # Serial #        Right Atrial Lead (Medtronic) [id number] - [id number]   Right Ventricular Lead (Medtronic)  [id number]  [id number]          Pulse Width (ms) Voltage (V) Current (mA) Impedance (Ohms) P / R Wave (mV)            Right Atrial Lead 0.5  1.0   691  4.5   Right Ventricular Lead 0.5  0.4   424  5.1         Generator Product Name Model #: Serial #:        EVERA MRI XT IS-1/DF4  [id number]             IMPRESSION:    1. Successful insertion of a dual chamber rate response implantable cardioverter defibrillator for primary prevention for ischemic cardiomyopathy with ejection fraction less than 30%  2. Successful insertion of a right atrial lead  3. Successful insertion of a right ventricular ICD lead  4. Successful ICD pocket creation  5. Successful placement of an antibiotic pouch  6.   Supervision of the administration of moderate conscious sedation      Electronically signed by Yovanny Ibarra MD on 2/23/21      Electronically signed by Yovanny Ibarra MD on 2/23/2021 at 3:02 PM

## 2021-02-24 PROCEDURE — 2580000003 HC RX 258: Performed by: INTERNAL MEDICINE

## 2021-02-24 PROCEDURE — 6370000000 HC RX 637 (ALT 250 FOR IP): Performed by: INTERNAL MEDICINE

## 2021-02-24 PROCEDURE — G0378 HOSPITAL OBSERVATION PER HR: HCPCS

## 2021-02-24 RX ADMIN — RANOLAZINE 1000 MG: 500 TABLET, FILM COATED, EXTENDED RELEASE ORAL at 08:55

## 2021-02-24 RX ADMIN — SODIUM CHLORIDE, PRESERVATIVE FREE 10 ML: 5 INJECTION INTRAVENOUS at 08:55

## 2021-02-24 RX ADMIN — POTASSIUM CHLORIDE 10 MEQ: 750 TABLET, EXTENDED RELEASE ORAL at 08:55

## 2021-02-24 RX ADMIN — FUROSEMIDE 80 MG: 80 TABLET ORAL at 08:54

## 2021-02-24 RX ADMIN — ASPIRIN 81 MG: 81 TABLET, CHEWABLE ORAL at 08:55

## 2021-02-24 RX ADMIN — APIXABAN 5 MG: 5 TABLET, FILM COATED ORAL at 08:55

## 2021-02-24 RX ADMIN — ISOSORBIDE MONONITRATE 60 MG: 60 TABLET, EXTENDED RELEASE ORAL at 08:54

## 2021-02-24 RX ADMIN — LISINOPRIL 2.5 MG: 2.5 TABLET ORAL at 08:55

## 2021-02-24 RX ADMIN — METOPROLOL TARTRATE 25 MG: 50 TABLET, FILM COATED ORAL at 08:55

## 2021-02-24 ASSESSMENT — PAIN SCALES - GENERAL: PAINLEVEL_OUTOF10: 0

## 2021-02-24 NOTE — PLAN OF CARE
Problem: Falls - Risk of:  Goal: Will remain free from falls  Description: Will remain free from falls  2/23/2021 2328 by Quynh Reynolds RN  Outcome: Ongoing  2/23/2021 1549 by Rafaela Batres RN  Outcome: Ongoing  Goal: Absence of physical injury  Description: Absence of physical injury  2/23/2021 2328 by Quynh Reynolds RN  Outcome: Ongoing  2/23/2021 1549 by Rafaela Batres RN  Outcome: Ongoing

## 2021-02-24 NOTE — DISCHARGE SUMMARY
Discharge Summary    Josep Euceda  :  1950  MRN:  [id number]    Admit date:  2021  Discharge date:      Admitting Physician:  Yovanny Ibarra MD    Advance Directive: Full Code    Consults: none    Primary Care Physician:  Kait Jovel MD    Discharge Diagnoses: Active Problems:    Ischemic cardiomyopathy  Resolved Problems:    * No resolved hospital problems. *      Problem List:   Patient Active Problem List    Diagnosis Date Noted    Ischemic cardiomyopathy 2021     Priority: High    Dizziness 2020     Priority: Low    PSVT (paroxysmal supraventricular tachycardia) ([address] 2020     Priority: Low    PAC (premature atrial contraction) 2020     Priority: Low    Hx of CABG 2020     Priority: Low    Type 2 diabetes mellitus without complication, without long-term current use of insulin ([address] 2020     Priority: Low     Overview Note:     Diet-controlled per patient. Recheck hemoglobin A1c and other labs.  Obesity (BMI 30.0-34.9) 2020     Priority: Low     Overview Note:     Recommended at least 150 minutes of exercise per week and resistance training 2 days a week. Discussed healthy diet habits. Offered suggestions for calorie counting.  Simple chronic bronchitis ([address] 2019     Priority: Low     Overview Note:     Patient is followed by pulmonology Dr. Scott Barfield, annual low-dose CT, patient states he does not have COPD and documentation supports this.       Essential hypertension 2018     Priority: Low     Overview Note:     Stable, continue lisinopril, Imdur, Lopressor      Chronic anticoagulation 2018     Priority: Low     Overview Note:     Eliquis      Amaurosis fugax 10/18/2017     Priority: Low    Chronic combined systolic and diastolic heart failure (HCC)      Priority: Low    Sick sinus syndrome (HCC)      Priority: Low    Paroxysmal atrial fibrillation ([address]      Priority: Low    Chest pain 2016     Priority: thoracic spine degenerative change. No acute osseous finding. Impression: No acute findings. Signed by Dr Bethany Collins on 2/23/2021 9:46 AM    Xr Chest Portable    Result Date: 2/23/2021  Examination. XR CHEST PORTABLE 2/23/2021 2:34 PM History: Pacemaker placement. Evaluation for pneumothorax. A single frontal portable upright view of the chest is compared with the previous study obtained earlier today. The lungs are poorly inflated. No active infiltrate, pulmonary congestion or pneumothorax. A dual-chamber cardiac pacer and defibrillator is seen in place with a left subclavian vein. The battery of the pacemaker projected over the left upper chest laterally. There is moderate cardiomegaly. The atheromatous changes of thoracic aorta are noted. No acute bony abnormality. The poor lung expansion but no active cardiopulmonary disease. No pneumothorax. A dual-chamber cardiac pacer and defibrillator in place. A moderate cardiomegaly. Signed by Dr Jaime Juárez on 2/23/2021 3:59 PM      Pertinent Labs:   CBC:   Recent Labs     02/23/21  0927   WBC 6.5   HGB 13.6*        BMP:    Recent Labs     02/23/21  0927      K 4.3      CO2 28   BUN 15   CREATININE 0.8   GLUCOSE 130*     INR: No results for input(s): INR in the last 72 hours. Lipids: No results for input(s): CHOL, HDL in the last 72 hours. Invalid input(s): LDLCALCU  ABGs:No results for input(s): PHART, FDP7PBM, PO2ART, SAF5KTN, BEART, HGBAE, R6DFQKYK, CARBOXHGBART, 02THERAPY in the last 72 hours. HgBA1c:  No results for input(s): LABA1C in the last 72 hours. Procedures: Dual-chamber ICD placement  Operative Note        Patient: Belen Cruz  YOB: 1950  MRN: [id number]     Date of Procedure: 2/23/2021     Pre-Op Diagnosis: Ischemic cardiomyopathy, ejection fraction 30%     Post-Op Diagnosis: Same       Procedure: Dual-chamber ICD placement     :  Harika Rosado MD     Anesthesia: Moderate conscious sedation  Anesthesia: Lidocaine LA  Sedation: Versed 2 mg; Fentanyl 50 mg  Start time: 11:23 AM  Stop time: 1:05 PM  ASA Class: 3  Estimated blood loss: Less than 20 mL  An independent trained observer pushed medications at my direction. The patient was monitored continuously with the ECG, pulse oximetry, blood pressure monitoring, and direct observation for level of consciousness. Complications: None     Detailed Description of Procedure:   After obtaining informed written consent, the patient was brought to the catheterization laboratory where the left chest area was prepared in the usual sterile fashion. The patient was monitored continuously with ECG, pulse oximetry, blood pressure monitoring and direct observation.       Additionally, please review the \"[facility] Hemodynamic Procedure Report\", which is generated electronically through the [employer]. This report includes additional details regarding this procedure including, but not limited to:                   1.          Patient Data,                2.          Admission,                3.          Procedure,                4.          Hemodynamics,                5.          Vital Signs,                6.          Medications, including conscious sedation medications given during the procedure (as note above),                7.          Procedure Log,                8.          Device Usage,                9.          Signature Audit Bruno, and,                10.        Signatures.     It should be noted, that I sign the \"Signatures\" line electronically through the \"HPF Def Portals\" tab on my computer.     The 's hands were scrubbed in a betadine solution for 5 minutes. Moderate conscious sedation was administered at my direction. Utilizing local anesthesia and a percutaneous technique, a single puncture was made in the left subclavian vein. Utilizing a combination of sharp and blunt dissection, a pacemaker pocket was created.  A second puncture was then made in the left subclavian vein.     The right ventricular ICD was inserted without difficulty and appropriate thresholds were obtained. The right atrial lead was secured with good thresholds but prolapsed down and was difficult to maintain likely from prior right atrial appendage ligation from prior surgery with possible scar tissue. The right atrial lead was then secured in a lateral position with good thresholds, impedances and appeared to be stable. The lead anchors were secured to the floor of the ICD pocket. The ICD pocket was copiously irrigated with antibiotic solution. The leads were attached to the generator. An antibiotic pouch was placed around the generator and leads and placed in the pocket. The subcutaneous and cutaneous tissues were approximated, and a sterile dressing applied. He was then taken to his room in stable and satisfactory condition.       Complications:  none     Technical Information:          Model # Serial #           Right Atrial Lead ([employer]tronic) [id number]   Right Ventricular Lead ([employer])  [id number]  [id number]              Pulse Width (ms) Voltage (V) Current (mA) Impedance (Ohms) P / R Wave (mV)                  Right Atrial Lead 0.5  1.0    691  4.5   Right Ventricular Lead 0.5  0.4    424  5. 1            Generator Product Name Model #: Serial #:           EVERA MRI XT IS-1/DF4  [id number]                  IMPRESSION:     1. Successful insertion of a dual chamber rate response implantable cardioverter defibrillator for primary prevention for ischemic cardiomyopathy with ejection fraction less than 30%  2. Successful insertion of a right atrial lead  3. Successful insertion of a right ventricular ICD lead  4. Successful ICD pocket creation  5. Successful placement of an antibiotic pouch  6.   Supervision of the administration of moderate conscious sedation        Electronically signed by Nj Pham MD on 2/23/21        Electronically signed by Serg Edmonds MD on 2/23/2021 at 3:02 PM  [facility] Course: Uneventful postprocedure course. Chest x-ray with no pneumothorax. ICD checked in a.m. with no significant issues. ICD site with no drainage or significant swelling. Physical Exam:    Vital Signs: /76   Pulse 61   Temp 96.6 °F (35.9 °C) (Temporal)   Resp 18   Ht 6' (1.829 m)   Wt 223 lb (101.2 kg)   SpO2 95%   BMI 30.24 kg/m²     Physical Exam      Discharge Medications:       Keyshawn Hutton   Home Medication Instructions FRD:[id number]    Printed on:02/24/21 3466   Medication Information                      aspirin 81 MG tablet  Take 81 mg by mouth daily             atorvastatin (LIPITOR) 80 MG tablet  Take 1 tablet by mouth nightly             Cholecalciferol (VITAMIN D) 2000 UNITS CAPS capsule  Take 1/2 tablet daily             ELIQUIS 5 MG TABS tablet  TAKE 1 TABLET BY MOUTH 2 TIMES DAILY             furosemide (LASIX) 80 MG tablet  Take 1 tablet by mouth daily             isosorbide mononitrate (IMDUR) 60 MG extended release tablet  Take (1) tab AM and 1-1/2 tab PM             lisinopril (PRINIVIL;ZESTRIL) 2.5 MG tablet  Take 1 tablet by mouth daily             metoprolol tartrate (LOPRESSOR) 25 MG tablet  Take 1 tablet by mouth 2 times daily             nitroGLYCERIN (NITROSTAT) 0.4 MG SL tablet  up to max of 3 total doses. If no relief after 1 dose, call 911. potassium chloride (KLOR-CON) 20 MEQ packet  Take 1/2 tablet daily             ranolazine (RANEXA) 1000 MG extended release tablet  Take 1 tablet by mouth 2 times daily                 Discharge Instructions: Follow-up in ICD clinic in 7 to 10 days.     [facility]  [address], [address] [address] [phone]  On 3/3/2021  For wound re-check at 10:00 AM     Jeff Alcantara, [address], [address]  [phone]    On 4/5/2021  3:15 PM        Take medications as directed. Resume activity as tolerated. Diet: DIET CARDIAC;      Disposition: Patient is medically stable and will be discharged *    Electronically signed by Aster Del Valle MD on 2/24/2021 at 8:43 AM

## 2021-02-25 ENCOUNTER — CARE COORDINATION (OUTPATIENT)
Dept: CASE MANAGEMENT | Age: 71
End: 2021-02-25

## 2021-02-25 VITALS
HEART RATE: 70 BPM | BODY MASS INDEX: 30.2 KG/M2 | TEMPERATURE: 96.6 F | SYSTOLIC BLOOD PRESSURE: 117 MMHG | WEIGHT: 223 LBS | RESPIRATION RATE: 18 BRPM | OXYGEN SATURATION: 95 % | DIASTOLIC BLOOD PRESSURE: 76 MMHG | HEIGHT: 72 IN

## 2021-02-25 DIAGNOSIS — I47.1 PSVT (PAROXYSMAL SUPRAVENTRICULAR TACHYCARDIA) (HCC): Primary | ICD-10-CM

## 2021-02-25 PROCEDURE — 1111F DSCHRG MED/CURRENT MED MERGE: CPT | Performed by: FAMILY MEDICINE

## 2021-02-25 NOTE — CONSULTS
ICD educational packet and cover letter sent to the patient's mailing address on record. Information included; incision care, affected arm ROM and weight bearing limitations, present and future precautionary measures, sign & symptom awareness with reasons to call the cardiologist, keeping of appointments, carrying identification card and transmitter operation. Patient instructed to contact cardiologist's office with questions or concerns.

## 2021-02-25 NOTE — CARE COORDINATION
Zach [facility] Transitions Initial Follow Up Call    Call within 2 business days of discharge: Yes    Patient: Heath Pickard Patient : 1950   MRN: [id number]  Reason for Admission:   Discharge Date: 21 RARS: No data recorded    Last Discharge [address] [address]       Complaint Diagnosis Description Type Department Provider    21   Admission (Discharged) from Azalea Laws MD           Spoke with: Heath Pickard and wife    Challenges to be reviewed by the provider   Additional needs identified to be addressed with provider No  none    Discussed COVID-19 related testing which was available at this time. Test results were negative. Patient informed of results, if available? Yes         Method of communication with provider : none    Advance Care Planning:   Does patient have an Advance Directive:  reviewed and current. Was this a readmission? No  Patient stated reason for admission: dual chamber ICD placement  Patients top risk factors for readmission: functional physical ability, medical condition and medication management    Care Transition Nurse (CTN) contacted the caregiver by telephone to perform post hospital discharge assessment. Verified name and  with caregiver as identifiers. Provided introduction to self, and explanation of the CTN role. CTN reviewed discharge instructions, medical action plan and red flags with caregiver who verbalized understanding. Caregiver given an opportunity to ask questions and does not have any further questions or concerns at this time. Were discharge instructions available to patient? Yes. Reviewed appropriate site of care based on symptoms and resources available to patient including: PCP and Specialist. The caregiver agrees to contact the PCP office for questions related to their healthcare.      Medication reconciliation was performed with caregiver, who verbalizes understanding of administration of home medications. Advised obtaining a 90-day supply of all daily and as-needed medications. Covid Risk Education    Patient has following risk factors of: diabetes. Education provided regarding infection prevention, and signs and symptoms of COVID-19 and when to seek medical attention with caregiver who verbalized understanding. Discussed exposure protocols and quarantine From CDC: Are you at higher risk for severe illness?   and given an opportunity for questions and concerns. The caregiver agrees to contact the COVID-19 hotline [phone] or PCP office for questions related to COVID-19. For more information on steps you can take to protect yourself, see CDC's How to Protect Yourself     Patient/family/caregiver given information for [employer] and agrees to enroll no    Discussed follow-up appointments. If no appointment was previously scheduled, appointment scheduling offered: Yes. Is follow up appointment scheduled within 7 days of discharge? Yes  Non-[facility] follow up appointment(s):     Plan for follow-up call in 5-7 days based on severity of symptoms and risk factors. Plan for next call: incision site assessment, HFU, pain  CTN provided contact information for future needs. Care Transitions 24 Hour Call    Do you have any ongoing symptoms?: No  Do you have a copy of your discharge instructions?: Yes  Do you have all of your prescriptions and are they filled?: No  Have you been contacted by a [address]?: No  Have you scheduled your follow up appointment?: Yes  How are you going to get to your appointment?: Car - family or friend to transport  Were you discharged with any Home Care or Post Acute Services: No  Do you feel like you have everything you need to keep you well at home?: Yes  Care Transitions Interventions         Follow Up : Spoke with patient and wife today for initial COVID call after discharge from [facility] for dual chamber ICD placement. She says he is doing well.  No new medications ordered, did medication review, 1111F order added. She says he takes care of his own meds, has mediset he fills once a week. She says patient is eating good keeping hydrated. She says he does not generally check his blood sugars unless he is symptomatic, he is not on any hyperglycemic medications. He has a wound check on 3/3 and is aware. Getting his 2nd Covid vaccine tomorrow, wife is taking him. He has HFU with DR. Hensley on next Tuesday and follow up with Edgar Knight on 4/5, and they are aware. He is having a bit of pain from incision site, which was instructed as normal. CTN advised alright to take Tylenol for pain. No drainage, redness, or fever at incision site per patient. Patient does have LW/AD documents on file and are current. Discussed CTN calls for Covid and follow up and they are accepting. No s/s of Covid at this time. Encouraged to call with prn issues or concerns. Will follow up at a later time.    Future Appointments   Date Time Provider Leonardo Aguirre   3/3/2021 10:00 AM SCHEDULE, LPS CARDIAC DEVICE [facility]   4/5/2021  3:15 PM BERNARDO Valentin N [facility]   10/6/2021 10:45 AM Nj Pham MD N [facility]       Joesph Kingsley RN

## 2021-03-03 ENCOUNTER — NURSE ONLY (OUTPATIENT)
Dept: CARDIOLOGY CLINIC | Age: 71
End: 2021-03-03

## 2021-03-03 DIAGNOSIS — Z51.89 VISIT FOR WOUND CHECK: Primary | ICD-10-CM

## 2021-03-03 PROCEDURE — 99024 POSTOP FOLLOW-UP VISIT: CPT | Performed by: NURSE PRACTITIONER

## 2021-03-03 NOTE — PROGRESS NOTES
Patient here for a wound check visit status post ICD implant. Incision dry and intact. No redness, swelling, or drainage noted  Edges well approximated  Instructed patient to wash area with antibacterial soap and keep it clean and dry. Reviewed discharged instructions and questions answered.   [facility] and patient verbalized understanding  Follow up as scheduled

## 2021-03-04 ENCOUNTER — CARE COORDINATION (OUTPATIENT)
Dept: CASE MANAGEMENT | Age: 71
End: 2021-03-04

## 2021-03-04 NOTE — CARE COORDINATION
Zach 45 Transitions Follow Up Call    3/4/2021    Patient: Maribel Query  Patient : 1950   MRN: [id number]  Reason for Admission:   Discharge Date: 21 RARS: No data recorded       Spoke with: N/A    Care Transitions Subsequent and Final Call    Subsequent and Final Calls  Care Transitions Interventions  Other Interventions: Follow Up : Attempted to make contact with Jimmy MALLORY for Covid f/u call without success. Unable to leave a message regarding intent of call and call back information. Will try again at a later time.       Future Appointments   Date Time Provider Leonardo Aguirre   2021  3:15 PM BERNARDO Fernandez [facility]   10/6/2021 10:45 AM MD ИРИНА Fisher [facility]       Darlene Salas RN
NO ACTIVE BLEEDING/wound check

## 2021-03-09 ENCOUNTER — TELEPHONE (OUTPATIENT)
Dept: CARDIOLOGY CLINIC | Age: 71
End: 2021-03-09

## 2021-03-11 ENCOUNTER — CARE COORDINATION (OUTPATIENT)
Dept: CASE MANAGEMENT | Age: 71
End: 2021-03-11

## 2021-04-05 ENCOUNTER — OFFICE VISIT (OUTPATIENT)
Dept: CARDIOLOGY CLINIC | Age: 71
End: 2021-04-05
Payer: MEDICARE

## 2021-04-05 VITALS
HEIGHT: 72 IN | SYSTOLIC BLOOD PRESSURE: 104 MMHG | WEIGHT: 222 LBS | BODY MASS INDEX: 30.07 KG/M2 | OXYGEN SATURATION: 99 % | HEART RATE: 76 BPM | DIASTOLIC BLOOD PRESSURE: 70 MMHG

## 2021-04-05 DIAGNOSIS — I25.10 CORONARY ARTERY DISEASE INVOLVING NATIVE CORONARY ARTERY OF NATIVE HEART WITHOUT ANGINA PECTORIS: Primary | ICD-10-CM

## 2021-04-05 DIAGNOSIS — Z95.810 AICD (AUTOMATIC CARDIOVERTER/DEFIBRILLATOR) PRESENT: ICD-10-CM

## 2021-04-05 DIAGNOSIS — I49.5 SICK SINUS SYNDROME (HCC): ICD-10-CM

## 2021-04-05 DIAGNOSIS — Z86.79 S/P ABLATION OF ATRIAL FIBRILLATION: ICD-10-CM

## 2021-04-05 DIAGNOSIS — Z79.01 CHRONIC ANTICOAGULATION: ICD-10-CM

## 2021-04-05 DIAGNOSIS — Z98.890 S/P ABLATION OF ATRIAL FIBRILLATION: ICD-10-CM

## 2021-04-05 DIAGNOSIS — I25.5 ISCHEMIC CARDIOMYOPATHY: ICD-10-CM

## 2021-04-05 DIAGNOSIS — Z95.1 HX OF CABG: ICD-10-CM

## 2021-04-05 DIAGNOSIS — I48.0 PAROXYSMAL ATRIAL FIBRILLATION (HCC): ICD-10-CM

## 2021-04-05 PROCEDURE — 99024 POSTOP FOLLOW-UP VISIT: CPT | Performed by: NURSE PRACTITIONER

## 2021-04-05 PROCEDURE — 93283 PRGRMG EVAL IMPLANTABLE DFB: CPT | Performed by: NURSE PRACTITIONER

## 2021-04-05 NOTE — PROGRESS NOTES
[facility], [place]. [address], [facility] [address] [facility]  [phone] office  [phone] fax      OFFICE VISIT:  2021    Yani Cormier - : 1950  Reason For Visit:  Rosalba Madrid is a 79 y.o. male who is here for Follow-Up from Hospital (Patient denies any cardiac symptoms. ), Coronary Artery Disease, and Cardiomyopathy    History:   Diagnosis Orders   1. Coronary artery disease involving native coronary artery of native heart without angina pectoris     2. Paroxysmal atrial fibrillation (HCC)     3. Sick sinus syndrome ([address]     4. Hx of CABG     5. Chronic anticoagulation     6. S/P ablation of atrial fibrillation     7. Ischemic cardiomyopathy     8. AICD (automatic cardioverter/defibrillator) present       The patient presents today for cardiology follow up and AICD check. Mr. Danitza Villagomez reports feeling well. He has not had issues with fluid retention or AICD discharges. AICD was implanted by Dr. Yovana grijalva 21. The patient denies symptoms to suggest myocardial ischemia, heart failure or arrhythmia. BP is well controlled on current regimen. The patient's PCP monitors cholesterol. Subjective  Cuba Banter E denies exertional chest pain, shortness of breath, orthopnea, paroxysmal nocturnal dyspnea, syncope, presyncope, sensed arrhythmia, edema and fatigue. The patient denies numbness or weakness to suggest cerebrovascular accident or transient ischemic attack.       Yani Cormier has the following history as recorded in [facility]:  Patient Active Problem List   Diagnosis Code    Coronary artery disease involving native coronary artery of native heart I25.10    Mixed hyperlipidemia E78.2    S/P ablation of atrial fibrillation Z98.890, Z86.79    History of amiodarone therapy Z92.29    Ex-smoker Z87.891    Chest pain R07.9    Paroxysmal atrial fibrillation (HCC) I48.0    Chronic combined systolic and diastolic heart failure (HCC) I50.42    Sick sinus syndrome (HCC) I49.5    Amaurosis fugax G45.3    Essential hypertension I10    Chronic anticoagulation Z79.01    Simple chronic bronchitis (HCC) J41.0    Type 2 diabetes mellitus without complication, without long-term current use of insulin (HCC) E11.9    Obesity (BMI 30.0-34. 9) E66.9    Hx of CABG Z95.1    PSVT (paroxysmal supraventricular tachycardia) ([facility]) I47.1    PAC (premature atrial contraction) I49.1    Dizziness R42    Ischemic cardiomyopathy I25.5     Past Medical History:   Diagnosis Date    Atrial fibrillation (HCC)     Atrial flutter (HCC)     CAD (coronary artery disease)     CAD (coronary atherosclerotic disease)     Diabetes mellitus ([facility]     diet controlled    Ex-smoker     quit 2013 /smoked 50 yrs    History of amiodarone therapy     [facility]lipidemia     [facility]      Hypertension     Hypokalemia     Hypotension     MI (myocardial infarction) ([facility]     S/P CABG x 4 11/11/2013 6/27/13    Stroke ([facility] 08/22/2017    eye     Past Surgical History:   Procedure Laterality Date    ABLATION OF DYSRHYTHMIC FOCUS      CARDIAC CATHETERIZATION  6/27/13  MDL    EF 45%    CARDIAC SURGERY      CABG x 4    CATARACT REMOVAL WITH IMPLANT Bilateral     COLONOSCOPY N/A 6/16/2020    Dr Simona Parsons x 1, AP x 1, 5 yr recall    CORONARY ARTERY BYPASS GRAFT  6/27/2013     Emergency CABG x 4, LIMA-DIAG, SVG-LAD, SVG-OM, SVG-PDA, RT EVH, DR DOLAN    CYST INCISION AND DRAINAGE N/A     RECTAL    EYE SURGERY      EYE SURGERY      cataract sx and implants (both eyes)    OTHER SURGICAL HISTORY      heart ablation    RETROPHYARYNGEAL ABCESS INCISION/DRAIN      Abcess near rectum     Family History   Problem Relation Age of Onset    Stroke Father     Heart Disease Father     High Blood Pressure Father     High Cholesterol Father     Other Father         \"swelling on vein behind stomach\"    Heart Disease Other     Colon Cancer Neg Hx     Colon Polyps Neg Hx     Esophageal Cancer Neg Hx     Liver Cancer Neg Hx     Liver Disease Neg Hx     Rectal Cancer Neg Hx     Stomach Cancer Neg Hx      Social History     Tobacco Use    Smoking status: Former Smoker     Packs/day: 1.00     Years: 15.00     Pack years: 15.00     Types: Cigarettes     Quit date: 2013     Years since quittin.7    Smokeless tobacco: Never Used   Substance Use Topics    Alcohol use: No      Current Outpatient Medications   Medication Sig Dispense Refill    isosorbide mononitrate (IMDUR) 60 MG extended release tablet Take (1) tab AM and 1-1/2 tab  tablet 3    lisinopril (PRINIVIL;ZESTRIL) 2.5 MG tablet Take 1 tablet by mouth daily 30 tablet 3    ranolazine (RANEXA) 1000 MG extended release tablet Take 1 tablet by mouth 2 times daily 16 tablet 0    metoprolol tartrate (LOPRESSOR) 25 MG tablet Take 1 tablet by mouth 2 times daily 60 tablet 0    nitroGLYCERIN (NITROSTAT) 0.4 MG SL tablet up to max of 3 total doses. If no relief after 1 dose, call 911. 25 tablet 5    potassium chloride (KLOR-CON) 20 MEQ packet Take 1/2 tablet daily      atorvastatin (LIPITOR) 80 MG tablet Take 1 tablet by mouth nightly (Patient taking differently: Take 80 mg by mouth every morning ) 90 tablet 3    ELIQUIS 5 MG TABS tablet TAKE 1 TABLET BY MOUTH 2 TIMES DAILY  3    furosemide (LASIX) 80 MG tablet Take 1 tablet by mouth daily 90 tablet 3    aspirin 81 MG tablet Take 81 mg by mouth daily      Cholecalciferol (VITAMIN D) 2000 UNITS CAPS capsule Take 1/2 tablet daily       No current facility-administered medications for this visit. Allergies: Amiodarone, Azithromycin, Histamine, Pcn [penicillins], Penicillins, and Unable to assess    Review of Systems  Constitutional - no appetite change, or unexpected weight change. No fever, chills or diaphoresis. No significant change in activity level or new onset of fatigue. HEENT - no significant rhinorrhea or epistaxis. No audible clicks, gallop or rub. No murmur. No lower extremity varicosities. No carotid bruits. Abdominal -  No visible distention, mass or pulsations. Extremities - No clubbing or cyanosis. No statis dermatitis or ulcers. N edema. Musculoskeletal -   No Osler's nodes. No kyphosis or scoliosis. Gait is even and regular without limp or shuffle. Ambulates without assistance. Skin -  Warm and dry; no rash or pallor. No new surgical wound. Neurological - No focal neurological deficits. Thought processes coherent. No apparent tremor. Oriented to person, place and time. Psychiatric -  Appropriate affect and mood. Data reviewed:  2/3/21 echo   Left ventricular chamber size is mildly dilated . Mild concentric left ventricular hypertrophy. Left ventricular systolic function is severely reduced   Left ventricular ejection fraction is visually estimated at 30%. Grade 3 LV diastolic dysfunction. Mild right atrial enlargement. Mildly dilated right ventricle. Right ventricle global systolic function is moderately reduced . Signature   Electronically signed by Toby Pineda DO(Interpreting   physician) on 02/03/2021 01:16 PM    8/19/20 Lexiscan  Impression   Impression:   There is large inferolateral infarct with no clear ischemia, with a   calculated ejection fraction of 42 %. Suggest: Clinical correlation. No significant change from Varela Panchal   study 10/2019.    Signed by Dr Titus Lujan on 8/21/2020 12:05 AM     Lab Results   Component Value Date    WBC 6.5 02/23/2021    HGB 13.6 (L) 02/23/2021    HCT 40.5 (L) 02/23/2021    MCV 97.1 (H) 02/23/2021     02/23/2021     Lab Results   Component Value Date     02/23/2021    K 4.3 02/23/2021     02/23/2021    CO2 28 02/23/2021    BUN 15 02/23/2021    CREATININE 0.8 02/23/2021    GLUCOSE 130 (H) 02/23/2021    CALCIUM 9.8 02/23/2021    PROT 6.5 (L) 08/11/2020    LABALBU 3.9 08/11/2020    BILITOT 0.6 08/11/2020    ALKPHOS 64 08/11/2020    AST 14 08/11/2020    ALT 8 08/11/2020    LABGLOM >60 02/23/2021    GFRAA >59 02/23/2021    GLOB 3.4 08/28/2016       Lab Results   Component Value Date    CHOL 126 (L) 03/20/2020    CHOL 122 (L) 01/25/2019    CHOL 120 (L) 12/27/2018     Lab Results   Component Value Date    TRIG 91 03/20/2020    TRIG 121 01/25/2019    TRIG 122 12/27/2018     Lab Results   Component Value Date    HDL 47 (L) 03/20/2020    HDL 41 (L) 01/25/2019    HDL 41 (L) 12/27/2018     Lab Results   Component Value Date    LDLCALC 61 03/20/2020    LDLCALC 57 01/25/2019    LDLCALC 55 12/27/2018       BP Readings from Last 3 Encounters:   04/05/21 104/70   02/24/21 117/76   01/27/21 110/80    Pulse Readings from Last 3 Encounters:   04/05/21 76   02/24/21 70   02/19/21 61        Wt Readings from Last 3 Encounters:   04/05/21 222 lb (100.7 kg)   02/23/21 223 lb (101.2 kg)   01/27/21 228 lb (103.4 kg)     Assessment/Plan:   Diagnosis Orders   1. Coronary artery disease involving native coronary artery of native heart without angina pectoris     2. Paroxysmal atrial fibrillation (HCC)     3. Sick sinus syndrome ([address]     4. Hx of CABG     5. Chronic anticoagulation     6. S/P ablation of atrial fibrillation     7. Ischemic cardiomyopathy     8. AICD (automatic cardioverter/defibrillator) present     [id number] Score: 5  Disclaimer: Risk Score calculation is dependent on accuracy of patient problem list and past encounter diagnosis. AICD interrogation:  ICD interrogation showed adequate battery voltage and charge time. Mode:  AAI-DDD. Lead and defib impedances stable. Pacing: AP-VS 0.7%. Reprogramming for sensitivity and threshold testing. Normal diagnostics and safety margins noted. A output 1.25 V at 0.40 ms; P wave 2.3 mV. RV output 0.75 V at 0.40 ms; R wave 7.5 mV. No ICD discharges. Monitored arrhythmia: none  Sustained arrhythmia:  none  Optivol stable. Office check 3 months.     Stable CV status without overt heart failure, sensed arrhythmia or angina. CAD - stable on current medical management. Systolic heart failure - NYHA class II stage B. GDMT includes Lopressor, Lisinopril and Lasix. Euvolemic. Maintaining low sodium diet and daily weight log. HTN - normotensive on current regimen. PAF - no recurrent AF. No bleeding issues on Eliquis. Hyperlipidemia - on Lipitor 80 mg daily. Labs followed by PCP. ldl 61. Patient is compliant with medication regimen. Previous cardiac history and records reviewed. Continue current medical management for cardiac related condition. Continue other current medications as directed. Continue to follow up with primary care provider for non cardiac medical problems. If your primary care provider is outside of the [facility], please request that your labs be faxed to this office at [phone]. BP goal 130/80 or less. Call the office with any problems, questions or concerns at [phone]. Cardiology follow up as scheduled in [address] appointments. 3 months. Educational included in patient instructions. Heart health.       BERNARDO Marshall

## 2021-04-05 NOTE — PATIENT INSTRUCTIONS
New instructions for today:  Eliquis can increase your risk of bleeding. If you notice blood in urine or stool, bleeding gums, excessive bruising or cough productive of bloody sputum, notify the office. Information on this blood thinner has been included in your after visit summary. Weigh yourself daily without clothing at the same time each day. Record a daily weight log. Call the office if you gain 3 pounds or more in 2 to 3 days or 5 pounds in one week. A sudden weight gain may mean that your heart failure is getting worse. Sodium causes your body to hold on to extra water. This may cause your heart failure symptoms to get worse. Limit sodium to 2,000 milligrams (mg) a day or less. That is less than 1 teaspoon of salt a day, including all the salt you eat in cooking or in packaged foods. Fluid restriction of 1500ml per day (about 6 cups of fluid per day). Patient Instructions:  Continue current medications as prescribed. Always keep a current medication list. Bring your medications to every office visit. Continue to follow up with primary care provider for non cardiac medical problems. Call the office with any problems, questions or concerns at [phone]. If you have been asked to keep a blood pressure log, do so for 2 weeks. Call the office to report readings to the triage nurse at [phone]. Follow up with cardiologist as scheduled. The following educational material has been included in this after visit summary for your review: Life simple 7. Heart health. Life simple 7  1) Manage blood pressure - high blood pressure is a major risk factor for heart disease and stroke. Keeping blood pressure in health range reduces strain on your heart, arteries and kidneys. Blood pressure goal is less than 130/80. 2) Control cholesterol - contributes to plaque, which can clog arteries and lead to heart disease and stroke.  When you control your cholesterol you are giving your arteries their best chance to remain clear. It is recommended that you get cholesterol lab work done once a year. 3) Reduce blood sugar - most of the food we eat is turning into glucose or blood sugar that our body uses for energy. Over time, high levels of blood sugar can damage your heart, kidneys, eyes and nerves. 4) Get active - living an active life is one of the most rewarding gifts you can give yourself and those you love. Simply put, daily physical activity increases your length and quality of life. Strive to exercise 15 minutes most days of the week. 5)  Eat better - A healthy diet is one of your best weapons for fighting cardiovascular disease. When you eat a heart healthy diet, you improve your chances for feeling good and staying healthy for life. 6)  Lose weight - when you shed extra fat an unnecessary pounds, you reduce the burden on your hear, lungs, blood vessels and skeleton. You give yourself the gift of active living, you lower your blood pressure and help yourself feel better. 7) Stop smoking - cigarette smokers have a higher risk of developing cardiovascular disease. If  You smoke, quitting is the best thing you can do for your health. Check American Heart Association on line for more information on Life's Simple 7 and tips for healthy living. A Healthy Heart: Care Instructions  Your Care Instructions     Coronary artery disease, also called heart disease, occurs when a substance called plaque builds up in the vessels that supply oxygen-rich blood to your heart muscle. This can narrow the blood vessels and reduce blood flow. A heart attack happens when blood flow is completely blocked. A high-fat diet, smoking, and other factors increase the risk of heart disease. Your doctor has found that you have a chance of having heart disease. You can do lots of things to keep your heart healthy. It may not be easy, but you can change your diet, exercise more, and quit smoking.  These steps really work to lower your chance of heart disease. Follow-up care is a key part of your treatment and safety. Be sure to make and go to all appointments, and call your doctor if you are having problems. It's also a good idea to know your test results and keep a list of the medicines you take. How can you care for yourself at home? Diet  · Use less salt when you cook and eat. This helps lower your blood pressure. Taste food before salting. Add only a little salt when you think you need it. With time, your taste buds will adjust to less salt. · Eat fewer snack items, fast foods, canned soups, and other high-salt, high-fat, processed foods. · Read food labels and try to avoid saturated and trans fats. They increase your risk of heart disease by raising cholesterol levels. · Limit the amount of solid fat-butter, margarine, and shortening-you eat. Use olive, peanut, or canola oil when you cook. Bake, broil, and steam foods instead of frying them. · Eat a variety of fruit and vegetables every day. Dark green, deep orange, red, or yellow fruits and vegetables are especially good for you. Examples include spinach, carrots, peaches, and berries. · Foods high in fiber can reduce your cholesterol and provide important vitamins and minerals. High-fiber foods include whole-grain cereals and breads, oatmeal, beans, brown rice, citrus fruits, and apples. · Eat lean proteins. Heart-healthy proteins include seafood, lean meats and poultry, eggs, beans, peas, nuts, seeds, and soy products. · Limit drinks and foods with added sugar. These include candy, desserts, and soda pop. Lifestyle changes  · If your doctor recommends it, get more exercise. Walking is a good choice. Bit by bit, increase the amount you walk every day. Try for at least 30 minutes on most days of the week. You also may want to swim, bike, or do other activities. · Do not smoke.  If you need help quitting, talk to your doctor about stop-smoking programs and medicines. These can increase your chances of quitting for good. Quitting smoking may be the most important step you can take to protect your heart. It is never too late to quit. · Limit alcohol to 2 drinks a day for men and 1 drink a day for women. Too much alcohol can cause health problems. · Manage other health problems such as diabetes, high blood pressure, and high cholesterol. If you think you may have a problem with alcohol or drug use, talk to your doctor. Medicines  · Take your medicines exactly as prescribed. Call your doctor if you think you are having a problem with your medicine. · If your doctor recommends aspirin, take the amount directed each day. Make sure you take aspirin and not another kind of pain reliever, such as acetaminophen (Tylenol). When should you call for help? [id number] if you have symptoms of a heart attack. These may include:  · Chest pain or pressure, or a strange feeling in the chest.  · Sweating. · Shortness of breath. · Pain, pressure, or a strange feeling in the back, neck, jaw, or upper belly or in one or both shoulders or arms. · Lightheadedness or sudden weakness. · A fast or irregular heartbeat. After you call 911, the  may tell you to chew 1 adult-strength or 2 to 4 low-dose aspirin. Wait for an ambulance. Do not try to drive yourself. Watch closely for changes in your health, and be sure to contact your doctor if you have any problems. Where can you learn more? Go to https://[employer]ie.thephotocloser.com. org and sign in to your [employer] account. Enter G828 in the [facility] box to learn more about \"A Healthy Heart: Care Instructions. \"     If you do not have an account, please click on the \"Sign Up Now\" link. Current as of: December 16, 2019               Content Version: 12.5  © 3573-4889 [employer], [employer]. Care instructions adapted under license by [facility] ([facility]).  If you have questions about a medical condition or this instruction, always ask your healthcare professional. [place] [address] any warranty or liability for your use of this information.

## 2021-04-23 DIAGNOSIS — Z95.810 AICD (AUTOMATIC CARDIOVERTER/DEFIBRILLATOR) PRESENT: Primary | ICD-10-CM

## 2021-04-23 DIAGNOSIS — I25.5 ISCHEMIC CARDIOMYOPATHY: ICD-10-CM

## 2021-04-27 ENCOUNTER — TELEPHONE (OUTPATIENT)
Dept: CARDIOLOGY CLINIC | Age: 71
End: 2021-04-27

## 2021-04-27 NOTE — TELEPHONE ENCOUNTER
Received 2 unscheduled carelink remote ICD interrogations on 4/26/21 showing no new observations. Called patient to find out why he sent report. He stated he moved his monitor to paint in the bedroom and then he moved it back.   Next scheduled report is 7/7/21

## 2021-06-16 ENCOUNTER — OFFICE VISIT (OUTPATIENT)
Dept: PULMONOLOGY | Facility: CLINIC | Age: 71
End: 2021-06-16

## 2021-06-16 VITALS
HEIGHT: 72 IN | DIASTOLIC BLOOD PRESSURE: 70 MMHG | BODY MASS INDEX: 30.48 KG/M2 | OXYGEN SATURATION: 98 % | SYSTOLIC BLOOD PRESSURE: 120 MMHG | HEART RATE: 69 BPM | WEIGHT: 225 LBS

## 2021-06-16 DIAGNOSIS — Z87.891 PERSONAL HISTORY OF NICOTINE DEPENDENCE: ICD-10-CM

## 2021-06-16 DIAGNOSIS — J43.2 CENTRILOBULAR EMPHYSEMA (HCC): ICD-10-CM

## 2021-06-16 DIAGNOSIS — Z12.2 SCREENING FOR MALIGNANT NEOPLASM OF RESPIRATORY ORGAN: ICD-10-CM

## 2021-06-16 DIAGNOSIS — I48.0 PAROXYSMAL ATRIAL FIBRILLATION (HCC): ICD-10-CM

## 2021-06-16 DIAGNOSIS — E66.9 OBESITY (BMI 30-39.9): ICD-10-CM

## 2021-06-16 DIAGNOSIS — J41.0 SIMPLE CHRONIC BRONCHITIS (HCC): Primary | ICD-10-CM

## 2021-06-16 PROCEDURE — 99214 OFFICE O/P EST MOD 30 MIN: CPT | Performed by: INTERNAL MEDICINE

## 2021-06-16 RX ORDER — ATORVASTATIN CALCIUM 80 MG/1
TABLET, FILM COATED ORAL
COMMUNITY
Start: 2021-06-04 | End: 2022-06-15 | Stop reason: SDUPTHER

## 2021-06-16 NOTE — ASSESSMENT & PLAN NOTE
He again has a defibrillator pacemaker in place and his rhythm is regular on exam today.  He should continue his Eliquis therapy.

## 2021-06-16 NOTE — ASSESSMENT & PLAN NOTE
This was noted on a previous chest CT but he again has no definite COPD by GOLD criteria by his last pulmonary functions.

## 2021-06-16 NOTE — PATIENT INSTRUCTIONS
The patient is stable from a pulmonary standpoint.  He has had a pacemaker defibrillator placed since I last seen him and this was done a few months ago.  He would be due for a follow-up screening chest CT in mid July and I will schedule this and call him with results.  Assuming it shows no significant abnormalities we will just plan on a follow-up visit 1 year from now otherwise.

## 2021-06-16 NOTE — ASSESSMENT & PLAN NOTE
Diet and exercise are encouraged to the degree that he is able to participate in any exercise in view of his cardiac issues.  He otherwise will follow up with his primary care physician regarding his elevated BMI.

## 2021-06-16 NOTE — PROGRESS NOTES
Chief Complaint  Simple chronic bronchitis    Subjective    History of Present Illness {CC  Problem List  Visit Diagnosis   Encounters  Notes  Medications  Labs  Result Review Imaging  Media     Eric Ramey presents to [facility] for chronic bronchitis.    History of Present Illness   Since I last seen the patient he has had a pacemaker defibrillator placed by Dr. Sheth.  He is not having any acute respiratory tract symptoms.  He has some shortness of breath associated with his cardiac disease but does not have any acute pulmonary symptoms.  He would be candidate for another screening chest CT sometime in mid July and I will schedule that and call him with results.  Otherwise we will just plan on a follow-up visit in 1 year.  He has had his COVID-19 vaccines in the form of the Moderna vaccine.  His last CT scan showed emphysema but no suspicious nodules.  In spite of the emphysematous changes noted on his most recent scan his prior PFTs done here in the office did not show evidence of any COPD by GOLD criteria.  If he had increasing respiratory symptoms we can get follow-up PFTs but at present he is in agreement with holding off on this.  We will have her get the follow-up screening chest CT.  Prior to Admission medications    Medication Sig Start Date End Date Taking? Authorizing Provider   apixaban (ELIQUIS) 5 MG tablet tablet TAKE 1 TABLET BY MOUTH 2 TIMES DAILY 10/8/17  Yes Provider, MD Aquiles   aspirin 81 MG tablet Take 81 mg by mouth daily   Yes Provider, MD Aquiles   atorvastatin (LIPITOR) 80 MG tablet  6/4/21  Yes Provider, MD Aquiles   Cholecalciferol (VITAMIN D) 2000 UNITS capsule 1/2 tablet once a day   Yes Provider, MD Aquiles   furosemide (LASIX) 80 MG tablet Take  by mouth.   Yes ProviderAquiles MD   isosorbide mononitrate (IMDUR) 60 MG 24 hr tablet Take 60 mg by mouth 2 (Two) Times a Day. 1/28/19  Yes Provider  "MD Aquiles   lisinopril (PRINIVIL,ZESTRIL) 2.5 MG tablet Take 2.5 mg by mouth. 10/12/19  Yes Aquiles Emanuel MD   metoprolol succinate XL (TOPROL-XL) 25 MG 24 hr tablet Take 25 mg by mouth 2 (two) times a day. Extended Release    Yes Aquiles Emanuel MD   nitroglycerin (NITROSTAT) 0.4 MG SL tablet up to max of 3 total doses. If no relief after 1 dose, call 911. 18  Yes Aquiles Emanuel MD   potassium chloride (K-DUR,KLOR-CON) 20 MEQ CR tablet Take  by mouth Daily.   Yes Aquiles Emanuel MD   ranolazine (RANEXA) 1000 MG 12 hr tablet Take 1,000 mg by mouth 2 (two) times a day. 6/10/19  Yes Aquiles Emanuel MD   atorvastatin (LIPITOR) 20 MG tablet Take 80 mg by mouth.    Aquiles Emanuel MD   Potassium 99 MG tablet Take  by mouth.    Aquiles Emanuel MD       Social History     Socioeconomic History   • Marital status:      Spouse name: Not on file   • Number of children: Not on file   • Years of education: Not on file   • Highest education level: Not on file   Tobacco Use   • Smoking status: Former Smoker     Quit date:      Years since quittin.4   • Smokeless tobacco: Never Used   Substance and Sexual Activity   • Alcohol use: No   • Drug use: No   • Sexual activity: Defer       Objective   Vital Signs:   /70   Pulse 69   Ht 182.9 cm (72\")   Wt 102 kg (225 lb)   SpO2 98%   BMI 30.52 kg/m²     Physical Exam  Vitals and nursing note reviewed.   Constitutional:       Comments: He is a somewhat overweight white male who appears in no acute distress.   HENT:      Head: Normocephalic.      Comments: He is wearing a mask.  Eyes:      Pupils: Pupils are equal, round, and reactive to light.   Cardiovascular:      Rate and Rhythm: Normal rate and regular rhythm.   Pulmonary:      Effort: Pulmonary effort is normal.      Breath sounds: Normal breath sounds.   Musculoskeletal:         General: Normal range of motion.   Skin:     General: Skin is warm and " dry.   Neurological:      General: No focal deficit present.      Mental Status: He is alert and oriented to person, place, and time.   Psychiatric:         Mood and Affect: Mood normal.         Behavior: Behavior normal.        Result Review :{ Labs  Result Review  Imaging  Med Tab  Media :          No results found for this or any previous visit.                 My interpretation of imaging:    CT Chest Low Dose Wo (07/09/2020 00:00)        Assessment and Plan {CC Problem List  Visit Diagnosis  ROS  Review (Popup)  [facility] Maintenance  Quality  BestPractice  Medications  SmartSets  SnapShot Encounters  Media      Diagnoses and all orders for this visit:    1. Simple chronic bronchitis (CMS/HCC) (Primary)  Assessment & Plan:  He is having no significant bronchitic symptoms at this time.      2. Paroxysmal atrial fibrillation (CMS/HCC)  Assessment & Plan:  He again has a defibrillator pacemaker in place and his rhythm is regular on exam today.  He should continue his Eliquis therapy.      3. Centrilobular emphysema (CMS/HCC)  Assessment & Plan:  This was noted on a previous chest CT but he again has no definite COPD by GOLD criteria by his last pulmonary functions.        4. Personal history of nicotine dependence  Assessment & Plan:  We will plan on a follow-up scan in mid July and call him with results.    Orders:  -      CT Chest Low Dose Cancer Screening WO; Future    5. Obesity (BMI 30-39.9)  Assessment & Plan:  Diet and exercise are encouraged to the degree that he is able to participate in any exercise in view of his cardiac issues.  He otherwise will follow up with his primary care physician regarding his elevated BMI.      6. Screening for malignant neoplasm of respiratory organ  -      CT Chest Low Dose Cancer Screening WO; Future      Patient's Body mass index is 30.52 kg/m². indicating that he is obese (BMI >30). Obesity-related health conditions include the following: coronary heart  disease. Obesity is unchanged.  Diet is encouraged as well as exercise to whatever degree he can participate in exercise in view of his coexisting cardiac disease.  Otherwise he will follow-up with his primary care physician regarding his elevated BMI.      Drew Bonilla MD  6/16/2021  17:58 CDT    Follow Up {Instructions Charge Capture  Follow-up Communications   Return in about 1 year (around 6/16/2022) for To see me specifically.    Patient was given instructions and counseling regarding his condition or for health maintenance advice. Please see specific information pulled into the AVS if appropriate.

## 2021-07-07 DIAGNOSIS — I47.29 NSVT (NONSUSTAINED VENTRICULAR TACHYCARDIA): ICD-10-CM

## 2021-07-07 DIAGNOSIS — I25.5 ISCHEMIC CARDIOMYOPATHY: ICD-10-CM

## 2021-07-07 DIAGNOSIS — Z95.810 AICD (AUTOMATIC CARDIOVERTER/DEFIBRILLATOR) PRESENT: Primary | ICD-10-CM

## 2021-07-07 PROCEDURE — 93296 REM INTERROG EVL PM/IDS: CPT | Performed by: NURSE PRACTITIONER

## 2021-07-07 PROCEDURE — 93295 DEV INTERROG REMOTE 1/2/MLT: CPT | Performed by: NURSE PRACTITIONER

## 2021-07-09 ENCOUNTER — TELEPHONE (OUTPATIENT)
Dept: PULMONOLOGY | Facility: CLINIC | Age: 71
End: 2021-07-09

## 2021-07-09 NOTE — TELEPHONE ENCOUNTER
Called patient to let him know that his LDCT Scan is scheduled on Wednesday 07/14/21 at 2:20 pm at [facility].  No answer or VM.

## 2021-07-12 ENCOUNTER — OFFICE VISIT (OUTPATIENT)
Dept: CARDIOLOGY CLINIC | Age: 71
End: 2021-07-12
Payer: MEDICARE

## 2021-07-12 VITALS
HEART RATE: 64 BPM | BODY MASS INDEX: 29.93 KG/M2 | DIASTOLIC BLOOD PRESSURE: 68 MMHG | SYSTOLIC BLOOD PRESSURE: 110 MMHG | HEIGHT: 72 IN | WEIGHT: 221 LBS

## 2021-07-12 DIAGNOSIS — Z95.1 HX OF CABG: ICD-10-CM

## 2021-07-12 DIAGNOSIS — Z79.01 CHRONIC ANTICOAGULATION: ICD-10-CM

## 2021-07-12 DIAGNOSIS — I25.5 ISCHEMIC CARDIOMYOPATHY: ICD-10-CM

## 2021-07-12 DIAGNOSIS — Z86.79 S/P ABLATION OF ATRIAL FIBRILLATION: ICD-10-CM

## 2021-07-12 DIAGNOSIS — I25.10 CORONARY ARTERY DISEASE INVOLVING NATIVE CORONARY ARTERY OF NATIVE HEART WITHOUT ANGINA PECTORIS: Primary | ICD-10-CM

## 2021-07-12 DIAGNOSIS — E78.2 MIXED HYPERLIPIDEMIA: ICD-10-CM

## 2021-07-12 DIAGNOSIS — Z98.890 S/P ABLATION OF ATRIAL FIBRILLATION: ICD-10-CM

## 2021-07-12 DIAGNOSIS — I10 ESSENTIAL HYPERTENSION: ICD-10-CM

## 2021-07-12 PROCEDURE — 93283 PRGRMG EVAL IMPLANTABLE DFB: CPT | Performed by: NURSE PRACTITIONER

## 2021-07-12 PROCEDURE — 99214 OFFICE O/P EST MOD 30 MIN: CPT | Performed by: NURSE PRACTITIONER

## 2021-07-12 NOTE — PROGRESS NOTES
[facility], [place]. [address]   [phone] office  [phone] fax      OFFICE VISIT:  2021    Manuel Murry - : 1950  Reason For Visit:  Jason Reyes is a 79 y.o. male who is here for 3 Month Follow-Up (and ICD recheck, he states that he has no cardiac symptoms. ), Coronary Artery Disease, and Cardiomyopathy    History:   Diagnosis Orders   1. Coronary artery disease involving native coronary artery of native heart without angina pectoris     2. S/P ablation of atrial fibrillation     3. Ischemic cardiomyopathy     4. Hx of CABG     5. Mixed hyperlipidemia     6. Essential hypertension       The patient presents today for cardiology follow up and AICD interrogation. The patient reports no cardiac related symptoms. He reports \"recently I had some bad stomach acid and threw up a little with some blood in in it. So, I went to my doctor and started an acid pill which really has helped. He checked my hemoglobin and it was okay. The patient reports no additional dyspepsia or dark tarry stools. He continues on Eliquis. The patient denies symptoms to suggest myocardial ischemia, heart failure or arrhythmia. BP is well controlled on current regimen. The patient's PCP monitors cholesterol. Subjective  Misty Chevy E denies exertional chest pain, shortness of breath, orthopnea, paroxysmal nocturnal dyspnea, syncope, presyncope, sensed arrhythmia, edema and fatigue. The patient denies numbness or weakness to suggest cerebrovascular accident or transient ischemic attack.      Manuel Murry has the following history as recorded in [facility]:  Patient Active Problem List   Diagnosis Code    Coronary artery disease involving native coronary artery of native heart I25.10    Mixed hyperlipidemia E78.2    S/P ablation of atrial fibrillation Z98.890, Z86.79    History of amiodarone therapy Z92.29    Ex-smoker G61.740    Chest pain R07.9    Paroxysmal atrial fibrillation (HCC) I48.0    Chronic combined systolic and diastolic heart failure (HCC) I50.42    Sick sinus syndrome (HCC) I49.5    Amaurosis fugax G45.3    Essential hypertension I10    Chronic anticoagulation Z79.01    Simple chronic bronchitis (HCC) J41.0    Type 2 diabetes mellitus without complication, without long-term current use of insulin (HCC) E11.9    Obesity (BMI 30.0-34. 9) E66.9    Hx of CABG Z95.1    PSVT (paroxysmal supraventricular tachycardia) (HCC) I47.1    PAC (premature atrial contraction) I49.1    Dizziness R42    Ischemic cardiomyopathy I25.5     Past Medical History:   Diagnosis Date    Atrial fibrillation (HCC)     Atrial flutter (HCC)     CAD (coronary artery disease)     CAD (coronary atherosclerotic disease)     Diabetes mellitus ([facility]     diet controlled    Ex-smoker     quit 2013 /smoked 50 yrs    History of amiodarone therapy     [facility]     [facility]      Hypertension     Hypokalemia     Hypotension     MI (myocardial infarction) ([facility]     S/P CABG x 4 11/11/2013 6/27/13    Stroke ([facility] 08/22/2017    eye     Past Surgical History:   Procedure Laterality Date    ABLATION OF DYSRHYTHMIC FOCUS      CARDIAC CATHETERIZATION  6/27/13  MDL    EF 45%    CARDIAC SURGERY      CABG x 4    CATARACT REMOVAL WITH IMPLANT Bilateral     COLONOSCOPY N/A 6/16/2020    Dr Lupe Sebastian x 1, AP x 1, 5 yr recall    CORONARY ARTERY BYPASS GRAFT  6/27/2013     Emergency CABG x 4, LIMA-DIAG, SVG-LAD, SVG-OM, SVG-PDA, RT EVH, DR DOLAN    CYST INCISION AND DRAINAGE N/A     RECTAL    EYE SURGERY      EYE SURGERY      cataract sx and implants (both eyes)    OTHER SURGICAL HISTORY      heart ablation    RETROPHYARYNGEAL ABCESS INCISION/DRAIN      Abcess near rectum     Family History   Problem Relation Age of Onset    Stroke Father     Heart Disease Father     High Blood Pressure Father     High Cholesterol Father     Other Father         \"swelling on vein behind stomach\"    Heart Disease Other     Colon Cancer Neg Hx     Colon Polyps Neg Hx     Esophageal Cancer Neg Hx     Liver Cancer Neg Hx     Liver Disease Neg Hx     Rectal Cancer Neg Hx     Stomach Cancer Neg Hx      Social History     Tobacco Use    Smoking status: Former Smoker     Packs/day: 1.00     Years: 15.00     Pack years: 15.00     Types: Cigarettes     Quit date: 2013     Years since quittin.0    Smokeless tobacco: Never Used   Substance Use Topics    Alcohol use: No      Current Outpatient Medications   Medication Sig Dispense Refill    isosorbide mononitrate (IMDUR) 60 MG extended release tablet Take (1) tab AM and 1-1/2 tab  tablet 3    lisinopril (PRINIVIL;ZESTRIL) 2.5 MG tablet Take 1 tablet by mouth daily 30 tablet 3    ranolazine (RANEXA) 1000 MG extended release tablet Take 1 tablet by mouth 2 times daily 16 tablet 0    metoprolol tartrate (LOPRESSOR) 25 MG tablet Take 1 tablet by mouth 2 times daily 60 tablet 0    nitroGLYCERIN (NITROSTAT) 0.4 MG SL tablet up to max of 3 total doses. If no relief after 1 dose, call 911. 25 tablet 5    potassium chloride (KLOR-CON) 20 MEQ packet Take 1/2 tablet daily      atorvastatin (LIPITOR) 80 MG tablet Take 1 tablet by mouth nightly (Patient taking differently: Take 80 mg by mouth every morning ) 90 tablet 3    ELIQUIS 5 MG TABS tablet TAKE 1 TABLET BY MOUTH 2 TIMES DAILY  3    furosemide (LASIX) 80 MG tablet Take 1 tablet by mouth daily 90 tablet 3    aspirin 81 MG tablet Take 81 mg by mouth daily      Cholecalciferol (VITAMIN D) 2000 UNITS CAPS capsule Take 1/2 tablet daily       No current facility-administered medications for this visit. Allergies: Amiodarone, Azithromycin, Histamine, Pcn [penicillins], Penicillins, and Unable to assess    Review of Systems  Constitutional - no appetite change, or unexpected weight change.  No fever, chills or diaphoresis. No significant change in activity level or new onset of fatigue. HEENT - no significant rhinorrhea or epistaxis. No tinnitus or significant hearing loss. Eyes - no sudden vision change or amaurosis. No corneal arcus, xantholasma, subconjunctival hemorrhage or discharge. Respiratory - no significant wheezing, stridor, apnea or cough. No dyspnea on exertion or shortness of air. Cardiovascular - no exertional chest pain to suggest myocardial ischemia. No orthopnea or PND. No sensation of sustained arrythmia. No occurrence of slow heart rate. No palpitations. No claudication. Gastrointestinal - no abdominal swelling or pain. No blood in stool. No severe constipation, diarrhea, nausea, or vomiting. Isolated incident of vomiting with blood noted. Genitourinary - no dysuria, frequency, or urgency. No flank pain or hematuria. Musculoskeletal - no back pain or myalgia. No problems with gait. Extremities - no clubbing, cyanosis or extremity edema. Skin - no color change or rash. No pallor. No new surgical incision. Neurologic - no speech difficulty, facial asymmetry or lateralizing weakness. No seizures, presyncope or syncope. No significant dizziness. Hematologic - no easy bruising or excessive bleeding. Psychiatric - no severe anxiety or insomnia. No confusion. All other review of systems are negative. Objective  Vital Signs - /68   Pulse 64   Ht 6' (1.829 m)   Wt 221 lb (100.2 kg)   BMI 29.97 kg/m²   General - Valorie MALLORY is alert, cooperative, and pleasant. Well groomed. No acute distress. Body habitus - Body mass index is 29.97 kg/m². HEENT - Head is normocephalic. No circumoral cyanosis. Dentition is normal.  EYES -   Lids normal without ptosis. No discharge, edema or subconjunctival hemorrhage. Neck - Symmetrical without apparent mass or lymphadenopathy. Respiratory - Normal respiratory effort without use of accessory muscles.   Ausculatation reveals vesicular breath sounds without crackles, wheezes, rub or rhonchi. Cardiovascular - No jugular venous distention. Auscultation reveals regular rate and rhythm. No audible clicks, gallop or rub. No murmur. No lower extremity varicosities. No carotid bruits. AICD generator implanted at left upper chest.  No overlying edema, erythema or erosion. Abdominal -  No visible distention, mass or pulsations. Extremities - No clubbing or cyanosis. No statis dermatitis or ulcers. No edema. Musculoskeletal -   No Osler's nodes. No kyphosis or scoliosis. Gait is even and regular without limp or shuffle. Ambulates without assistance. Skin -  Warm and dry; no rash or pallor. No new surgical wound. Neurological - No focal neurological deficits. Thought processes coherent. No apparent tremor. Oriented to person, place and time. Psychiatric -  Appropriate affect and mood. Data reviewed:  Prior Cardiac History -   06/27/2013 CABG X4 LIMA-diag, VG-LAD, VG-OM, VG-PDA Su Aceves)  11/10/2015  SE negative for myocardial ischemia  6/24/2016  TTE  Normal LVFX  7/2/2018 lexiscan Positive for inferior MI + myocardial ischemia, EF 50%, 12% ischemic myocardium on stress, intermediate risk findings, AUC indication 17, AUC score 7  7/11/18  Cath  Severe NVD, patent LIMA-LAD, patent yet extremely small VG-LAD, patent VG-RCA, closed VG-OM, anterior lateral akinesis, EF 35%  10/11/19  lexiscan Positive for inferior lateral myocardial ischemia, EF 51%, 1% ischemic myocardium on stress, low risk findings, AUC indication 15, AUC score 4, Santiago Clemente MD)   10/12/19  Cath  Severe NVD, patent LIMA-LAD, patent yet extremely small VG-LAD, patent VG-RCA, closed VG-OM, anterior lateral and inferior severe hypokinesis EF 30%     2/3/21 echo   Left ventricular chamber size is mildly dilated . Mild concentric left ventricular hypertrophy.    Left ventricular systolic function is severely reduced   Left ventricular ejection fraction lb (101.2 kg)     Assessment/Plan:   Diagnosis Orders   1. Coronary artery disease involving native coronary artery of native heart without angina pectoris     2. S/P ablation of atrial fibrillation     3. Ischemic cardiomyopathy     4. Hx of CABG     5. Mixed hyperlipidemia     6. Essential hypertension     [id number] Score: 5  Disclaimer: Risk Score calculation is dependent on accuracy of patient problem list and past encounter diagnosis.'    AICD check:  ICD interrogation showed adequate battery voltage and charge time. Mode:  AAI-DDD. Lead and defib impedances stable. Pacing: AP-VS 1.6%. Reprogramming for sensitivity and threshold testing. Normal diagnostics and safety margins noted. A output 1.50 V at 0.40 ms; P wave 2.3 mV. RV output 0.50 V at 0.40 ms; R wave 7.8 mV. No ICD discharges. Monitored arrhythmia: 1 monitored VT. Sustained arrhythmia:  none. Optivol stable. Next Carelink:  3 months. Stable CV status without overt heart failure, sensed arrhythmia or angina. CAD - stable on current medical management. Continue same. Ischemic CM - systolic heart failure - NYHA class II stage C  GDMT includes Lopressor, Lisinopril and Lasix. Currently euvolemic with stable Optivol on AICD check. HTN - normotensive on current regimen. Continue same. Hx AF s/p ablation - no recurrent AF. Continue Eliquis. No current bleeding issues. Hyperlipidemia - LDL 61 on Lipitor 80 mg daily. Continue troy.e  Patient is compliant with medication regimen. Previous cardiac history and records reviewed. Continue current medical management for cardiac related condition. Continue other current medications as directed. Continue to follow up with primary care provider for non cardiac medical problems. If your primary care provider is outside of the [facility], please request that your labs be faxed to this office at [phone]. BP goal 130/80 or less.   Call the office with any problems, questions or concerns at [phone]. Cardiology follow up as scheduled in [address] appointments. Educational included in patient instructions. Heart health. Avoid heart failure triggers.      Emely Short APRN

## 2021-07-12 NOTE — PATIENT INSTRUCTIONS
New instructions for today:  Weigh yourself daily without clothing at the same time each day. Record a daily weight log. Call the office if you gain 3 pounds or more in 2 to 3 days or 5 pounds in one week. A sudden weight gain may mean that your heart failure is getting worse. Sodium causes your body to hold on to extra water. This may cause your heart failure symptoms to get worse. Limit sodium to 2,000 milligrams (mg) a day or less. That is less than 1 teaspoon of salt a day, including all the salt you eat in cooking or in packaged foods. Fluid restriction of 1500ml per day (about 6 cups of fluid per day). Eliquis can increase your risk of bleeding. If you notice blood in urine or stool, bleeding gums, excessive bruising or cough productive of bloody sputum, notify the office. Information on this blood thinner has been included in your after visit summary. Patient Instructions:  Continue current medications as prescribed. Always keep a current medication list. Bring your medications to every office visit. Continue to follow up with primary care provider for non cardiac medical problems. Call the office with any problems, questions or concerns at [phone]. If you have been asked to keep a blood pressure log, do so for 2 weeks. Call the office to report readings to the triage nurse at [phone]. Follow up with cardiologist as scheduled. The following educational material has been included in this after visit summary for your review: Life simple 7. Heart health. Avoid heart failure triggers. Life simple 7  1) Manage blood pressure - high blood pressure is a major risk factor for heart disease and stroke. Keeping blood pressure in health range reduces strain on your heart, arteries and kidneys. Blood pressure goal is less than 130/80. 2) Control cholesterol - contributes to plaque, which can clog arteries and lead to heart disease and stroke.  When you control your doctor about stop-smoking programs and medicines. These can increase your chances of quitting for good. Quitting smoking may be the most important step you can take to protect your heart. It is never too late to quit. · Limit alcohol to 2 drinks a day for men and 1 drink a day for women. Too much alcohol can cause health problems. · Manage other health problems such as diabetes, high blood pressure, and high cholesterol. If you think you may have a problem with alcohol or drug use, talk to your doctor. Medicines  · Take your medicines exactly as prescribed. Call your doctor if you think you are having a problem with your medicine. · If your doctor recommends aspirin, take the amount directed each day. Make sure you take aspirin and not another kind of pain reliever, such as acetaminophen (Tylenol). When should you call for help? [id number] if you have symptoms of a heart attack. These may include:  · Chest pain or pressure, or a strange feeling in the chest.  · Sweating. · Shortness of breath. · Pain, pressure, or a strange feeling in the back, neck, jaw, or upper belly or in one or both shoulders or arms. · Lightheadedness or sudden weakness. · A fast or irregular heartbeat. After you call 911, the  may tell you to chew 1 adult-strength or 2 to 4 low-dose aspirin. Wait for an ambulance. Do not try to drive yourself. Watch closely for changes in your health, and be sure to contact your doctor if you have any problems. Where can you learn more? Go to https://RecombinestefaniPixelapse.Continuum. org and sign in to your [employer] account. Enter K457 in the [facility] box to learn more about \"A Healthy Heart: Care Instructions. \"     If you do not have an account, please click on the \"Sign Up Now\" link. Current as of: December 16, 2019               Content Version: 12.5  © 2103-7957 Healthwise, Incorporated. Care instructions adapted under license by [facility] ([facility]).  If you have questions about a medical condition or this instruction, always ask your healthcare professional. [address] any warranty or liability for your use of this information. Patient Education        Avoiding Triggers With Heart Failure: Care Instructions  Your Care Instructions     Triggers are anything that make your heart failure flare up. A flare-up is also called \"sudden heart failure\" or \"acute heart failure. \" When you have a flare-up, fluid builds up in your lungs, and you have problems breathing. You might need to go to the hospital. By watching for changes in your condition and avoiding triggers, you can prevent heart failure flare-ups. Follow-up care is a key part of your treatment and safety. Be sure to make and go to all appointments, and call your doctor if you are having problems. It's also a good idea to know your test results and keep a list of the medicines you take. How can you care for yourself at home? Watch for changes in your weight and condition  · Weigh yourself without clothing at the same time each day. Record your weight. Call your doctor if you have sudden weight gain, such as more than 2 to 3 pounds in a day or 5 pounds in a week. (Your doctor may suggest a different range of weight gain.) A sudden weight gain may mean that your heart failure is getting worse. · Keep a daily record of your symptoms. Write down any changes in how you feel, such as new shortness of breath, cough, or problems eating. Also record if your ankles are more swollen than usual and if you feel more tired than usual. Note anything that you ate or did that could have triggered these changes. Limit sodium  Sodium causes your body to hold on to extra water. This may cause your heart failure symptoms to get worse. People get most of their sodium from processed foods. Fast food and restaurant meals also tend to be very high in sodium. · Your doctor may suggest that you limit sodium.  Your doctor can tell you how much sodium is right for you. This includes limiting sodium in cooked and packaged foods. · Read food labels on cans and food packages. They tell you how much sodium you get in one serving. Check the serving size. If you eat more than one serving, you are getting more sodium. · Be aware that sodium can come in forms other than salt, including monosodium glutamate (MSG), sodium citrate, and sodium bicarbonate (baking soda). MSG is often added to Asian food. You can sometimes ask for food without MSG or salt. · Slowly reducing salt will help you adjust to the taste. Take the salt shaker off the table. · Flavor your food with garlic, lemon juice, onion, vinegar, herbs, and spices instead of salt. Do not use soy sauce, steak sauce, onion salt, garlic salt, mustard, or ketchup on your food, unless it is labeled \"low-sodium\" or \"low-salt. \"  · Make your own salad dressings, sauces, and ketchup without adding salt. · Use fresh or frozen ingredients, instead of canned ones, whenever you can. Choose low-sodium canned goods. · Eat less processed food and food from restaurants, including fast food. Exercise as directed  Moderate, regular exercise is very good for your heart. It improves your blood flow and helps control your weight. But too much exercise can stress your heart and cause a heart failure flare-up. · Check with your doctor before you start an exercise program.  · Walking is an easy way to get exercise. Start out slowly. Gradually increase the length and pace of your walk. Swimming, riding a bike, and using a treadmill are also good forms of exercise. · When you exercise, watch for signs that your heart is working too hard. You are pushing yourself too hard if you cannot talk while you are exercising. If you become short of breath or dizzy or have chest pain, stop, sit down, and rest.  · Do not exercise when you do not feel well. Take medicines correctly  · Take your medicines exactly as prescribed. Call your doctor if you think you are having a problem with your medicine. · Make a list of all the medicines you take. Include those prescribed to you by other doctors and any over-the-counter medicines, vitamins, or supplements you take. Take this list with you when you go to any doctor. · Take your medicines at the same time every day. It may help you to post a list of all the medicines you take every day and what time of day you take them. · Make taking your medicine as simple as you can. Plan times to take your medicines when you are doing other things, such as eating a meal or getting ready for bed. This will make it easier to remember to take your medicines. · Get organized. Use helpful tools, such as daily or weekly pill containers. When should you call for help? Call 911  if you have symptoms of sudden heart failure such as:    · You have severe trouble breathing.     · You cough up pink, foamy mucus.     · You have a new irregular or rapid heartbeat. Call your doctor now or seek immediate medical care if:    · You have new or increased shortness of breath.     · You are dizzy or lightheaded, or you feel like you may faint.     · You have sudden weight gain, such as more than 2 to 3 pounds in a day or 5 pounds in a week. (Your doctor may suggest a different range of weight gain.)     · You have increased swelling in your legs, ankles, or feet.     · You are suddenly so tired or weak that you cannot do your usual activities. Watch closely for changes in your health, and be sure to contact your doctor if you develop new symptoms. Where can you learn more? Go to https://Comat Technologiesissac.Admatic. org and sign in to your [employer] account. Enter V980 in the [employer] box to learn more about \"Avoiding Triggers With Heart Failure: Care Instructions. \"     If you do not have an account, please click on the \"Sign Up Now\" link.   Current as of: August 31, 2020               Content Version: 12.9  © 2006-2021 Healthwise, Incorporated. Care instructions adapted under license by [facility] ([facility]). If you have questions about a medical condition or this instruction, always ask your healthcare professional. [address] any warranty or liability for your use of this information.

## 2021-07-14 ENCOUNTER — HOSPITAL ENCOUNTER (OUTPATIENT)
Dept: CT IMAGING | Age: 71
Discharge: HOME | End: 2021-07-14
Payer: MEDICARE

## 2021-07-14 DIAGNOSIS — Z87.891 PERSONAL HISTORY OF TOBACCO USE, PRESENTING HAZARDS TO HEALTH: ICD-10-CM

## 2021-07-14 PROCEDURE — 71271 CT THORAX LUNG CANCER SCR C-: CPT

## 2021-07-16 DIAGNOSIS — Z87.891 PERSONAL HISTORY OF NICOTINE DEPENDENCE: ICD-10-CM

## 2021-07-16 DIAGNOSIS — Z12.2 SCREENING FOR MALIGNANT NEOPLASM OF RESPIRATORY ORGAN: ICD-10-CM

## 2021-10-06 ENCOUNTER — OFFICE VISIT (OUTPATIENT)
Dept: CARDIOLOGY CLINIC | Age: 71
End: 2021-10-06
Payer: MEDICARE

## 2021-10-06 VITALS
DIASTOLIC BLOOD PRESSURE: 64 MMHG | OXYGEN SATURATION: 99 % | HEART RATE: 56 BPM | BODY MASS INDEX: 30.2 KG/M2 | SYSTOLIC BLOOD PRESSURE: 98 MMHG | HEIGHT: 72 IN | WEIGHT: 223 LBS

## 2021-10-06 DIAGNOSIS — I25.5 ISCHEMIC CARDIOMYOPATHY: ICD-10-CM

## 2021-10-06 PROCEDURE — 93282 PRGRMG EVAL IMPLANTABLE DFB: CPT | Performed by: INTERNAL MEDICINE

## 2021-10-06 PROCEDURE — 99214 OFFICE O/P EST MOD 30 MIN: CPT | Performed by: INTERNAL MEDICINE

## 2021-10-06 ASSESSMENT — ENCOUNTER SYMPTOMS
VOMITING: 0
BACK PAIN: 0
BLOOD IN STOOL: 0
ABDOMINAL DISTENTION: 0
SHORTNESS OF BREATH: 0
DIARRHEA: 0
COUGH: 0
WHEEZING: 0
ABDOMINAL PAIN: 0

## 2021-10-06 NOTE — PROGRESS NOTES
[facility]  Cardiology Office Note  [address]  [id number]  Phone: [phone]  Fax: [phone]                            Date:  10/6/2021  Patient: Denece Jess  Age:  70 y.o., 1950    Referral: Adri Estevez MD      PROBLEM LIST:    Patient Active Problem List    Diagnosis Date Noted    Ischemic cardiomyopathy 02/23/2021     Priority: High    Dizziness 11/04/2020     Priority: Low    PSVT (paroxysmal supraventricular tachycardia) ([address] 09/24/2020     Priority: Low    PAC (premature atrial contraction) 09/24/2020     Priority: Low    Hx of CABG 08/13/2020     Priority: Low    Type 2 diabetes mellitus without complication, without long-term current use of insulin ([address] 03/19/2020     Priority: Low     Overview Note:     Diet-controlled per patient. Recheck hemoglobin A1c and other labs.  Obesity (BMI 30.0-34.9) 03/19/2020     Priority: Low     Overview Note:     Recommended at least 150 minutes of exercise per week and resistance training 2 days a week. Discussed healthy diet habits. Offered suggestions for calorie counting.  Simple chronic bronchitis ([address] 06/17/2019     Priority: Low     Overview Note:     Patient is followed by pulmonology Dr. Keyur Mullins, annual low-dose CT, patient states he does not have COPD and documentation supports this.       Essential hypertension 04/26/2018     Priority: Low     Overview Note:     Stable, continue lisinopril, Imdur, Lopressor      Chronic anticoagulation 04/26/2018     Priority: Low     Overview Note:     Eliquis      Amaurosis fugax 10/18/2017     Priority: Low    Chronic combined systolic and diastolic heart failure (HCC)      Priority: Low    Sick sinus syndrome (HCC)      Priority: Low    Paroxysmal atrial fibrillation ([address]      Priority: Low    Chest pain 06/22/2016     Priority: Low    Ex-smoker      Priority: Low     Overview Note:     quit 2013 /smoked 50 yrs      S/P ablation of atrial fibrillation 11/11/2013     Priority: Low     Overview Note:     6/27/13      History of amiodarone therapy      Priority: Low    Mixed hyperlipidemia      Priority: Low     Overview Note:     Check lipids      Coronary artery disease involving native coronary artery of native heart      Priority: Low     Overview Note:     06/27/2013 CABG X4 LIMA-diag, VG-LAD, VG-OM, VG-PDA Dread Sandhu)  11/10/2015  SE negative for myocardial ischemia  6/24/2016  TTE  Normal LVFX  7/2/2018 lexiscan Positive for inferior MI + myocardial ischemia, EF 50%, 12% ischemic myocardium on stress, intermediate risk findings, AUC indication 17, AUC score 7  7/11/18  Cath  Severe NVD, patent LIMA-LAD, patent yet extremely small VG-LAD, patent VG-RCA, closed VG-OM, anterior lateral akinesis, EF 35%  10/11/19  lexiscan Positive for inferior lateral myocardial ischemia, EF 51%, 1% ischemic myocardium on stress, low risk findings, AUC indication 15, AUC score 4, (MD Shay)   10/12/19            1.  Coronary artery disease status post CABG 6/27/2013 with four-vessel grafting, LIMA to diagonal (patent), SVG to LAD (attenuated with small LAD) SVG to OM (occluded), SVG to RCA (patent), catheterization 10/12/2019 with occluded mid LAD, stenotic mid RCA, large inferior infarct, ejection fraction 35%. 2.  Paroxysmal atrial fibrillation status post prior ablations x2, on Eliquis. 3.  Diabetes mellitus type 2.  4.  Hypertension. 5.  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       PRESENTATION: Carlo Donovan is a 70y.o. year old male presents for follow-up evaluation. He has been doing quite well. He reports no chest pain since his last visit. He did some heavy exertion carrying sand and mulch for his home without too much difficulty. He does get some shortness of breath if he pushes himself. He is able to climb a flight of stairs without difficulty. No leg swelling. No claudication symptoms.  Occasional lightheadedness if he gets up too quickly which lasts for a few seconds and resolves. REVIEW OF SYSTEMS:  Review of Systems   Constitutional: Negative for activity change, diaphoresis and fatigue. HENT: Negative for hearing loss, nosebleeds and tinnitus. Eyes: Negative for visual disturbance. Respiratory: Negative for cough, shortness of breath and wheezing. Cardiovascular: Negative for chest pain, palpitations and leg swelling. Gastrointestinal: Negative for abdominal distention, abdominal pain, blood in stool, diarrhea and vomiting. Endocrine: Negative for cold intolerance, heat intolerance, polydipsia, polyphagia and polyuria. Genitourinary: Negative for difficulty urinating, flank pain and hematuria. Musculoskeletal: Negative for arthralgias, back pain, joint swelling and myalgias. Skin: Negative for pallor and rash. Neurological: Negative for dizziness, seizures, syncope and headaches. Psychiatric/Behavioral: Negative for behavioral problems and dysphoric mood. The patient is not nervous/anxious. Past Medical History:      Diagnosis Date    Atrial fibrillation ([address]     Atrial flutter (HCC)     CAD (coronary artery disease)     CAD (coronary atherosclerotic disease)     Diabetes mellitus ([address]     diet controlled    Ex-smoker     quit 2013 /smoked 50 yrs    History of amiodarone therapy     [facility]lipidemia     [facility]      Hypertension     Hypokalemia     Hypotension     MI (myocardial infarction) ([address]     S/P CABG x 4 11/11/2013 6/27/13    Stroke ([address] 08/22/2017    eye       Past Surgical History:      Procedure Laterality Date    ABLATION OF DYSRHYTHMIC FOCUS      CARDIAC CATHETERIZATION  6/27/13  MDL    EF 45%    CARDIAC SURGERY      CABG x 4    CATARACT REMOVAL WITH IMPLANT Bilateral     COLONOSCOPY N/A 6/16/2020    Dr Reyna Brown x 1, AP x 1, 5 yr recall    CORONARY ARTERY BYPASS GRAFT  6/27/2013     Emergency CABG x 4, LIMA-DIAG, SVG-LAD, SVG-OM, SVG-PDA, RT EVH, DR Eugenie Jones    CYST INCISION AND DRAINAGE N/A     RECTAL    EYE SURGERY      EYE SURGERY      cataract sx and implants (both eyes)    OTHER SURGICAL HISTORY      heart ablation    RETROPHYARYNGEAL ABCESS INCISION/DRAIN      Abcess near rectum       Medications:  Current Outpatient Medications   Medication Sig Dispense Refill    isosorbide mononitrate (IMDUR) 60 MG extended release tablet Take (1) tab AM and 1-1/2 tab  tablet 3    lisinopril (PRINIVIL;ZESTRIL) 2.5 MG tablet Take 1 tablet by mouth daily 30 tablet 3    ranolazine (RANEXA) 1000 MG extended release tablet Take 1 tablet by mouth 2 times daily 16 tablet 0    metoprolol tartrate (LOPRESSOR) 25 MG tablet Take 1 tablet by mouth 2 times daily 60 tablet 0    nitroGLYCERIN (NITROSTAT) 0.4 MG SL tablet up to max of 3 total doses. If no relief after 1 dose, call 911. 25 tablet 5    potassium chloride (KLOR-CON) 20 MEQ packet Take 1/2 tablet daily      atorvastatin (LIPITOR) 80 MG tablet Take 1 tablet by mouth nightly (Patient taking differently: Take 80 mg by mouth every morning ) 90 tablet 3    ELIQUIS 5 MG TABS tablet TAKE 1 TABLET BY MOUTH 2 TIMES DAILY  3    furosemide (LASIX) 80 MG tablet Take 1 tablet by mouth daily 90 tablet 3    aspirin 81 MG tablet Take 81 mg by mouth daily      Cholecalciferol (VITAMIN D) 2000 UNITS CAPS capsule Take 1/2 tablet daily       No current facility-administered medications for this visit.        Allergies:  Amiodarone, Azithromycin, Histamine, Pcn [penicillins], Penicillins, and Unable to assess    Past Social History:  Social History     Socioeconomic History    Marital status:      Spouse name: Not on file    Number of children: Not on file    Years of education: Not on file    Highest education level: Not on file   Occupational History    Not on file   Tobacco Use    Smoking status: Former Smoker     Packs/day: 1.00     Years: 15.00     Pack years: 15.00 Types: Cigarettes     Quit date: 2013     Years since quittin.2    Smokeless tobacco: Never Used   Vaping Use    Vaping Use: Never used   Substance and Sexual Activity    Alcohol use: No    Drug use: No    Sexual activity: Yes     Partners: Female   Other Topics Concern    Not on file   Social History Narrative    ** Merged History Encounter **          Social Determinants of Health     Financial Resource Strain:     Difficulty of Paying Living Expenses:    Food Insecurity:     Worried About Running Out of Food in the Last Year:     Ran Out of Food in the Last Year:    Transportation Needs:     Lack of Transportation (Medical):      Lack of Transportation (Non-Medical):    Physical Activity:     Days of Exercise per Week:     Minutes of Exercise per Session:    Stress:     Feeling of Stress :    Social Connections:     Frequency of Communication with Friends and Family:     Frequency of Social Gatherings with Friends and Family:     Attends [employer] Services:     Active Member of Clubs or Organizations:     Attends Club or Organization Meetings:     Marital Status:    Intimate Partner Violence:     Fear of Current or Ex-Partner:     Emotionally Abused:     Physically Abused:     Sexually Abused:        Family History:       Problem Relation Age of Onset    Stroke Father     Heart Disease Father     High Blood Pressure Father     High Cholesterol Father     Other Father         \"swelling on vein behind stomach\"    Heart Disease Other     Colon Cancer Neg Hx     Colon Polyps Neg Hx     Esophageal Cancer Neg Hx     Liver Cancer Neg Hx     Liver Disease Neg Hx     Rectal Cancer Neg Hx     Stomach Cancer Neg Hx          Physical Examination:  BP 98/64   Pulse 56   Ht 6' (1.829 m)   Wt 223 lb (101.2 kg)   SpO2 99%   BMI 30.24 kg/m²   Physical Exam  Constitutional:       Comments: Mild to moderate truncal obesity  Blood pressure left arm sitting 100/60 mmHg, pulse 64 bpm regular   HENT:      Mouth/Throat:      Pharynx: No oropharyngeal exudate. Eyes:      General: No scleral icterus. Right eye: No discharge. Left eye: No discharge. Neck:      Thyroid: No thyromegaly. Vascular: No carotid bruit or JVD. Cardiovascular:      Rate and Rhythm: Normal rate and regular rhythm. Heart sounds: No murmur heard. No friction rub. No gallop. Comments: No JVD  No edema  No significant systolic or diastolic murmurs noted  Pedal pulses are diminished  Pulmonary:      Effort: No respiratory distress. Breath sounds: No stridor. No wheezing or rales. Abdominal:      General: Bowel sounds are normal. There is no distension. Palpations: Abdomen is soft. There is no mass. Tenderness: There is no abdominal tenderness. There is no guarding or rebound. Comments: No palpable organomegaly  No abnormal midline pulsations felt   Musculoskeletal:         General: No deformity. Skin:     General: Skin is warm. Coloration: Skin is not pale. Findings: No erythema or rash. Neurological:      Mental Status: He is alert and oriented to person, place, and time. Motor: No abnormal muscle tone. Coordination: Coordination normal.      Deep Tendon Reflexes: Reflexes normal.           Labs:   CBC: No results for input(s): WBC, HGB, HCT, PLT in the last 72 hours. BMP:No results for input(s): NA, K, CO2, BUN, CREATININE, LABGLOM, GLUCOSE in the last 72 hours. BNP: No results for input(s): BNP in the last 72 hours. PT/INR: No results for input(s): PROTIME, INR in the last 72 hours. APTT:No results for input(s): APTT in the last 72 hours. CARDIAC ENZYMES:No results for input(s): CKTOTAL, CKMB, CKMBINDEX, TROPONINI in the last 72 hours.   FASTING LIPID PANEL:  Lab Results   Component Value Date    HDL 47 03/20/2020    LDLDIRECT 84 11/07/2015    LDLCALC 61 03/20/2020    TRIG 91 03/20/2020     LIVER PROFILE:No results for input(s): AST, ALT,

## 2021-10-06 NOTE — PROGRESS NOTES
ICD interrogated  Presenting rhythm: AS/VS, AP 8%,  0.1%  Battery status: 10.6 years  Lead status: lead impedance within range and stable  Sensing: P waves 1.8 mV,  R waves 6.1 mV  Thresholds:  Atrial 1.750 V @ 0.4ms, ventricular 0.625 @ 0.4ms  Observations:  None  Reprogramming for sensitivity and threshold testing  Next [facility] home check scheduled for 01/07/22

## 2021-11-03 NOTE — ED PROVIDER NOTES
Patient Education     Understanding Uterine Bleeding  Your uterine bleeding may be heavy. Or you may have bleeding between periods. These problems may be caused by hormonal imbalance. Or they can be caused by uterine growths, an intrauterine device (IUD), bleeding disorder, or pregnancy.  Hormonal imbalance  Your menstrual cycle is controlled by hormones. The hormones include estrogen and progesterone. Sometimes there is too much or too little of one or both of these hormones, causing an imbalance. This can cause heavy periods. Or it can cause bleeding between periods. Causes of hormonal imbalance can include:    Hormonal changes in teens and in women nearing menopause    Diabetes, thyroid disease, or other medical problems    Obesity    Stress    Strenuous exercise    Anorexia, an eating disorder    Pregnancy  Uterine growths  There are different kinds of uterine growths. These include:    Fibroids. These are round knots of noncancer (benign) muscle tissue in the uterus.    Polyps. These are soft tissue growths in the uterine lining. They often extend into the uterus.    Adenomyosis. This is when the uterine lining grows into the muscle wall.    Hyperplasia. This is when the uterine lining gets too thick or grows too much.    Endometrial cancer. This is uncontrolled growth of part of the uterine lining.  Other causes of uterine bleeding  There are other causes of uterine bleeding. These include:    IUD (intrauterine device). This is a method of birth control. Some IUDs contain hormones.    Bleeding disorders. This is when the blood can't clot properly.  Treatment  Your healthcare provider can help diagnose the cause of your bleeding problem. He or she will work with you to plan treatment as needed.  Haseeb last reviewed this educational content on 5/1/2020 2000-2021 [employer]. All rights reserved. This information is not intended as a substitute for professional medical care. Always follow  I assumed care from [employer]. [place]. Patient presents with chest pain. He was given nitro and his chest pain has resolved. He does have a past medical history of CAD and follows Dr. Mike Vera. At this time he has had a negative cardiac workup and Dr. Mike Vera will be evaluating the patient in the emergency department and will be dispositioning the patient. Dr. Mike Vera reports the patient can go home as he was to raise his metoprolol to 50 mg twice a day. Follow-up with Dr. Mike Vera as needed or return back to the ED with any new or worsening symptoms. The patient is hemodynamically stable. Patient was told that if symptoms worsen or new symptoms develop that they are to return back to the emergency room immediately. Patient was educated on diagnosis and treatment plan. All of the patient's questions were answered and the patient understands the discharge plan. I do not feel that the patient has a life-threatening condition at this time. The patient is to be discharged.        Maksim Ballard, BERNARDO  04/17/19 6249 your healthcare professional's instructions.

## 2022-01-10 DIAGNOSIS — Z95.810 AICD (AUTOMATIC CARDIOVERTER/DEFIBRILLATOR) PRESENT: Primary | ICD-10-CM

## 2022-01-10 DIAGNOSIS — I49.5 SICK SINUS SYNDROME (HCC): ICD-10-CM

## 2022-01-10 DIAGNOSIS — I25.5 ISCHEMIC CARDIOMYOPATHY: ICD-10-CM

## 2022-01-10 PROCEDURE — 93295 DEV INTERROG REMOTE 1/2/MLT: CPT | Performed by: NURSE PRACTITIONER

## 2022-01-10 PROCEDURE — 93296 REM INTERROG EVL PM/IDS: CPT | Performed by: NURSE PRACTITIONER

## 2022-04-07 ENCOUNTER — OFFICE VISIT (OUTPATIENT)
Dept: CARDIOLOGY CLINIC | Age: 72
End: 2022-04-07
Payer: MEDICARE

## 2022-04-07 VITALS
SYSTOLIC BLOOD PRESSURE: 102 MMHG | OXYGEN SATURATION: 98 % | BODY MASS INDEX: 30.2 KG/M2 | DIASTOLIC BLOOD PRESSURE: 68 MMHG | HEIGHT: 72 IN | WEIGHT: 223 LBS | HEART RATE: 52 BPM

## 2022-04-07 DIAGNOSIS — Z95.810 AICD (AUTOMATIC CARDIOVERTER/DEFIBRILLATOR) PRESENT: ICD-10-CM

## 2022-04-07 DIAGNOSIS — I10 ESSENTIAL HYPERTENSION: ICD-10-CM

## 2022-04-07 DIAGNOSIS — I50.42 CHRONIC COMBINED SYSTOLIC AND DIASTOLIC HEART FAILURE (HCC): ICD-10-CM

## 2022-04-07 DIAGNOSIS — E78.2 MIXED HYPERLIPIDEMIA: ICD-10-CM

## 2022-04-07 DIAGNOSIS — Z86.79 S/P ABLATION OF ATRIAL FIBRILLATION: ICD-10-CM

## 2022-04-07 DIAGNOSIS — I49.5 SICK SINUS SYNDROME (HCC): ICD-10-CM

## 2022-04-07 DIAGNOSIS — Z95.1 HX OF CABG: ICD-10-CM

## 2022-04-07 DIAGNOSIS — I25.10 CORONARY ARTERY DISEASE INVOLVING NATIVE CORONARY ARTERY OF NATIVE HEART WITHOUT ANGINA PECTORIS: Primary | ICD-10-CM

## 2022-04-07 DIAGNOSIS — Z98.890 S/P ABLATION OF ATRIAL FIBRILLATION: ICD-10-CM

## 2022-04-07 PROCEDURE — 93283 PRGRMG EVAL IMPLANTABLE DFB: CPT | Performed by: NURSE PRACTITIONER

## 2022-04-07 PROCEDURE — 99214 OFFICE O/P EST MOD 30 MIN: CPT | Performed by: NURSE PRACTITIONER

## 2022-04-07 RX ORDER — PANTOPRAZOLE SODIUM 20 MG/1
20 TABLET, DELAYED RELEASE ORAL DAILY
COMMUNITY

## 2022-04-07 NOTE — PATIENT INSTRUCTIONS
New instructions for today:  Weigh yourself daily without clothing at the same time each day. Record a daily weight log. Call the office if you gain 3 pounds or more in 2 to 3 days or 5 pounds in one week. A sudden weight gain may mean that your heart failure is getting worse. Sodium causes your body to hold on to extra water. This may cause your heart failure symptoms to get worse. Limit sodium to 2,000 milligrams (mg) a day or less. That is less than 1 teaspoon of salt a day, including all the salt you eat in cooking or in packaged foods. Fluid restriction of 1500ml per day (about 6 cups of fluid per day). Patient Instructions:  Continue current medications as prescribed. Always keep a current medication list. Bring your medications to every office visit. Continue to follow up with primary care provider for non cardiac medical problems. Call the office with any problems, questions or concerns at [phone]. If you have been asked to keep a blood pressure log, do so for 2 weeks. Call the office to report readings to the triage nurse at [phone]. Follow up with cardiologist as scheduled. The following educational material has been included in this after visit summary for your review: Life simple 7. Heart health. Life simple 7  1) Manage blood pressure - high blood pressure is a major risk factor for heart disease and stroke. Keeping blood pressure in health range reduces strain on your heart, arteries and kidneys. Blood pressure goal is less than 130/80. 2) Control cholesterol - contributes to plaque, which can clog arteries and lead to heart disease and stroke. When you control your cholesterol you are giving your arteries their best chance to remain clear. It is recommended that you get cholesterol lab work done once a year. 3) Reduce blood sugar - most of the food we eat is turning into glucose or blood sugar that our body uses for energy.   Over time, high levels of blood sugar can damage your heart, kidneys, eyes and nerves. 4) Get active - living an active life is one of the most rewarding gifts you can give yourself and those you love. Simply put, daily physical activity increases your length and quality of life. Strive to exercise 15 minutes most days of the week. 5)  Eat better - A healthy diet is one of your best weapons for fighting cardiovascular disease. When you eat a heart healthy diet, you improve your chances for feeling good and staying healthy for life. 6)  Lose weight - when you shed extra fat an unnecessary pounds, you reduce the burden on your hear, lungs, blood vessels and skeleton. You give yourself the gift of active living, you lower your blood pressure and help yourself feel better. 7) Stop smoking - cigarette smokers have a higher risk of developing cardiovascular disease. If  You smoke, quitting is the best thing you can do for your health. Check American Heart Association on line for more information on Life's Simple 7 and tips for healthy living. A Healthy Heart: Care Instructions  Your Care Instructions     Coronary artery disease, also called heart disease, occurs when a substance called plaque builds up in the vessels that supply oxygen-rich blood to your heart muscle. This can narrow the blood vessels and reduce blood flow. A heart attack happens when blood flow is completely blocked. A high-fat diet, smoking, and other factors increase the risk of heart disease. Your doctor has found that you have a chance of having heart disease. You can do lots of things to keep your heart healthy. It may not be easy, but you can change your diet, exercise more, and quit smoking. These steps really work to lower your chance of heart disease. Follow-up care is a key part of your treatment and safety. Be sure to make and go to all appointments, and call your doctor if you are having problems.  It's also a good idea to know your test results and keep a list of the medicines you take. How can you care for yourself at home? Diet  · Use less salt when you cook and eat. This helps lower your blood pressure. Taste food before salting. Add only a little salt when you think you need it. With time, your taste buds will adjust to less salt. · Eat fewer snack items, fast foods, canned soups, and other high-salt, high-fat, processed foods. · Read food labels and try to avoid saturated and trans fats. They increase your risk of heart disease by raising cholesterol levels. · Limit the amount of solid fat-butter, margarine, and shortening-you eat. Use olive, peanut, or canola oil when you cook. Bake, broil, and steam foods instead of frying them. · Eat a variety of fruit and vegetables every day. Dark green, deep orange, red, or yellow fruits and vegetables are especially good for you. Examples include spinach, carrots, peaches, and berries. · Foods high in fiber can reduce your cholesterol and provide important vitamins and minerals. High-fiber foods include whole-grain cereals and breads, oatmeal, beans, brown rice, citrus fruits, and apples. · Eat lean proteins. Heart-healthy proteins include seafood, lean meats and poultry, eggs, beans, peas, nuts, seeds, and soy products. · Limit drinks and foods with added sugar. These include candy, desserts, and soda pop. Lifestyle changes  · If your doctor recommends it, get more exercise. Walking is a good choice. Bit by bit, increase the amount you walk every day. Try for at least 30 minutes on most days of the week. You also may want to swim, bike, or do other activities. · Do not smoke. If you need help quitting, talk to your doctor about stop-smoking programs and medicines. These can increase your chances of quitting for good. Quitting smoking may be the most important step you can take to protect your heart. It is never too late to quit. · Limit alcohol to 2 drinks a day for men and 1 drink a day for women.  Too much alcohol can cause health problems. · Manage other health problems such as diabetes, high blood pressure, and high cholesterol. If you think you may have a problem with alcohol or drug use, talk to your doctor. Medicines  · Take your medicines exactly as prescribed. Call your doctor if you think you are having a problem with your medicine. · If your doctor recommends aspirin, take the amount directed each day. Make sure you take aspirin and not another kind of pain reliever, such as acetaminophen (Tylenol). When should you call for help? [id number] if you have symptoms of a heart attack. These may include:  · Chest pain or pressure, or a strange feeling in the chest.  · Sweating. · Shortness of breath. · Pain, pressure, or a strange feeling in the back, neck, jaw, or upper belly or in one or both shoulders or arms. · Lightheadedness or sudden weakness. · A fast or irregular heartbeat. After you call 911, the  may tell you to chew 1 adult-strength or 2 to 4 low-dose aspirin. Wait for an ambulance. Do not try to drive yourself. Watch closely for changes in your health, and be sure to contact your doctor if you have any problems. Where can you learn more? Go to https://Efficient Power Conversion.AttorneyFee. org and sign in to your [employer] account. Enter I295 in the BoxCast box to learn more about \"A Healthy Heart: Care Instructions. \"     If you do not have an account, please click on the \"Sign Up Now\" link. Current as of: December 16, 2019               Content Version: 12.5  © 2084-8650 Healthwise, Incorporated. Care instructions adapted under license by [email or web address] ([facility]). If you have questions about a medical condition or this instruction, always ask your healthcare professional. [place] [address] any warranty or liability for your use of this information. New instructions for today:      Patient Instructions:  Continue current medications as prescribed.    Always keep a current medication list. Bring your medications to every office visit. Continue to follow up with primary care provider for non cardiac medical problems. Call the office with any problems, questions or concerns at [phone]. If you have been asked to keep a blood pressure log, do so for 2 weeks. Call the office to report readings to the triage nurse at [phone]. Follow up with cardiologist as scheduled. The following educational material has been included in this after visit summary for your review: Life simple 7. Heart health. Life simple 7  1) Manage blood pressure - high blood pressure is a major risk factor for heart disease and stroke. Keeping blood pressure in health range reduces strain on your heart, arteries and kidneys. Blood pressure goal is less than 130/80. 2) Control cholesterol - contributes to plaque, which can clog arteries and lead to heart disease and stroke. When you control your cholesterol you are giving your arteries their best chance to remain clear. It is recommended that you get cholesterol lab work done once a year. 3) Reduce blood sugar - most of the food we eat is turning into glucose or blood sugar that our body uses for energy. Over time, high levels of blood sugar can damage your heart, kidneys, eyes and nerves. 4) Get active - living an active life is one of the most rewarding gifts you can give yourself and those you love. Simply put, daily physical activity increases your length and quality of life. Strive to exercise 15 minutes most days of the week. 5)  Eat better - A healthy diet is one of your best weapons for fighting cardiovascular disease. When you eat a heart healthy diet, you improve your chances for feeling good and staying healthy for life. 6)  Lose weight - when you shed extra fat an unnecessary pounds, you reduce the burden on your hear, lungs, blood vessels and skeleton.   You give yourself the gift of active living, you lower your blood pressure and help yourself feel better. 7) Stop smoking - cigarette smokers have a higher risk of developing cardiovascular disease. If  You smoke, quitting is the best thing you can do for your health. Check American Heart Association on line for more information on Life's Simple 7 and tips for healthy living. A Healthy Heart: Care Instructions  Your Care Instructions     Coronary artery disease, also called heart disease, occurs when a substance called plaque builds up in the vessels that supply oxygen-rich blood to your heart muscle. This can narrow the blood vessels and reduce blood flow. A heart attack happens when blood flow is completely blocked. A high-fat diet, smoking, and other factors increase the risk of heart disease. Your doctor has found that you have a chance of having heart disease. You can do lots of things to keep your heart healthy. It may not be easy, but you can change your diet, exercise more, and quit smoking. These steps really work to lower your chance of heart disease. Follow-up care is a key part of your treatment and safety. Be sure to make and go to all appointments, and call your doctor if you are having problems. It's also a good idea to know your test results and keep a list of the medicines you take. How can you care for yourself at home? Diet  · Use less salt when you cook and eat. This helps lower your blood pressure. Taste food before salting. Add only a little salt when you think you need it. With time, your taste buds will adjust to less salt. · Eat fewer snack items, fast foods, canned soups, and other high-salt, high-fat, processed foods. · Read food labels and try to avoid saturated and trans fats. They increase your risk of heart disease by raising cholesterol levels. · Limit the amount of solid fat-butter, margarine, and shortening-you eat. Use olive, peanut, or canola oil when you cook. Bake, broil, and steam foods instead of frying them.   · Eat a variety of fruit and vegetables every day. Dark green, deep orange, red, or yellow fruits and vegetables are especially good for you. Examples include spinach, carrots, peaches, and berries. · Foods high in fiber can reduce your cholesterol and provide important vitamins and minerals. High-fiber foods include whole-grain cereals and breads, oatmeal, beans, brown rice, citrus fruits, and apples. · Eat lean proteins. Heart-healthy proteins include seafood, lean meats and poultry, eggs, beans, peas, nuts, seeds, and soy products. · Limit drinks and foods with added sugar. These include candy, desserts, and soda pop. Lifestyle changes  · If your doctor recommends it, get more exercise. Walking is a good choice. Bit by bit, increase the amount you walk every day. Try for at least 30 minutes on most days of the week. You also may want to swim, bike, or do other activities. · Do not smoke. If you need help quitting, talk to your doctor about stop-smoking programs and medicines. These can increase your chances of quitting for good. Quitting smoking may be the most important step you can take to protect your heart. It is never too late to quit. · Limit alcohol to 2 drinks a day for men and 1 drink a day for women. Too much alcohol can cause health problems. · Manage other health problems such as diabetes, high blood pressure, and high cholesterol. If you think you may have a problem with alcohol or drug use, talk to your doctor. Medicines  · Take your medicines exactly as prescribed. Call your doctor if you think you are having a problem with your medicine. · If your doctor recommends aspirin, take the amount directed each day. Make sure you take aspirin and not another kind of pain reliever, such as acetaminophen (Tylenol). When should you call for help? [id number] if you have symptoms of a heart attack. These may include:  · Chest pain or pressure, or a strange feeling in the chest.  · Sweating.   · Shortness of breath. · Pain, pressure, or a strange feeling in the back, neck, jaw, or upper belly or in one or both shoulders or arms. · Lightheadedness or sudden weakness. · A fast or irregular heartbeat. After you call 911, the  may tell you to chew 1 adult-strength or 2 to 4 low-dose aspirin. Wait for an ambulance. Do not try to drive yourself. Watch closely for changes in your health, and be sure to contact your doctor if you have any problems. Where can you learn more? Go to https://Swift Shift.Catamaran. org and sign in to your [employer] account. Enter W382 in the My Mega Bookstore box to learn more about \"A Healthy Heart: Care Instructions. \"     If you do not have an account, please click on the \"Sign Up Now\" link. Current as of: December 16, 2019               Content Version: 12.5  © 3380-1148 Healthwise, Incorporated. Care instructions adapted under license by [facility] ([facility]). If you have questions about a medical condition or this instruction, always ask your healthcare professional. [place] [address] any warranty or liability for your use of this information.

## 2022-04-08 ENCOUNTER — HOSPITAL ENCOUNTER (OUTPATIENT)
Dept: VASCULAR LAB | Age: 72
Discharge: HOME | End: 2022-04-08
Payer: MEDICARE

## 2022-04-08 DIAGNOSIS — I73.9 PERIPHERAL ARTERIAL DISEASE (HCC): ICD-10-CM

## 2022-04-08 PROCEDURE — 93923 UPR/LXTR ART STDY 3+ LVLS: CPT

## 2022-06-15 ENCOUNTER — OFFICE VISIT (OUTPATIENT)
Dept: PULMONOLOGY | Facility: CLINIC | Age: 72
End: 2022-06-15

## 2022-06-15 VITALS
BODY MASS INDEX: 30.61 KG/M2 | WEIGHT: 226 LBS | OXYGEN SATURATION: 98 % | HEIGHT: 72 IN | DIASTOLIC BLOOD PRESSURE: 82 MMHG | SYSTOLIC BLOOD PRESSURE: 124 MMHG | HEART RATE: 70 BPM

## 2022-06-15 DIAGNOSIS — Z12.2 SCREENING FOR MALIGNANT NEOPLASM OF RESPIRATORY ORGAN: ICD-10-CM

## 2022-06-15 DIAGNOSIS — Z87.891 PERSONAL HISTORY OF NICOTINE DEPENDENCE: ICD-10-CM

## 2022-06-15 DIAGNOSIS — J41.0 SIMPLE CHRONIC BRONCHITIS: Primary | ICD-10-CM

## 2022-06-15 DIAGNOSIS — J43.2 CENTRILOBULAR EMPHYSEMA: ICD-10-CM

## 2022-06-15 DIAGNOSIS — I48.0 PAROXYSMAL ATRIAL FIBRILLATION: ICD-10-CM

## 2022-06-15 DIAGNOSIS — E66.9 OBESITY (BMI 30-39.9): ICD-10-CM

## 2022-06-15 PROCEDURE — 99214 OFFICE O/P EST MOD 30 MIN: CPT | Performed by: INTERNAL MEDICINE

## 2022-06-15 NOTE — ASSESSMENT & PLAN NOTE
Patient's (Body mass index is 30.65 kg/m².) indicates that they are obese (BMI >30) with health conditions that include hypertension . Weight is unchanged.  Diet and exercise are encouraged and he will follow-up with his primary care physician regarding his elevated BMI otherwise.

## 2022-06-15 NOTE — ASSESSMENT & PLAN NOTE
He quit smoking in 2013.  I will continue screening chest CTs on an annual basis as long as he meets criteria.

## 2022-06-15 NOTE — PATIENT INSTRUCTIONS
The patient is stable from a pulmonary standpoint with no respiratory tract complaints.  He will be due for screening chest CT in mid July and I will schedule that.  I will call him with results.  Assuming no new or suspicious lesions are noted we will just see him back in 1 year.

## 2022-06-15 NOTE — ASSESSMENT & PLAN NOTE
This has been noted on previous chest CT scans but by prior pulmonary functions he does not have COPD.

## 2022-06-15 NOTE — PROGRESS NOTES
Chief Complaint  simple chronic bronchitis    Subjective    History of Present Illness     Eric Ramey presents to [facility] for simple chronic bronchitis.    History of Present Illness   The patient is doing well from a pulmonary standpoint.  He states his he is doing well in terms of his breathing since he had the pacemaker inserted a little over a year ago and is having no respiratory tract complaints at this time.  I did tell him we will get a follow-up screening CT in mid July at [facility] and call him with results.  Assuming no new or suspicious nodules are noted we will just plan on continued yearly follow-up visits.  He has had his COVID-19 vaccine in the form of the Moderna vaccine and would be a candidate for a booster.  He is also had a Prevnar 13 and would be a candidate for a Pneumovax either per his primary care physician or one of the local pharmacies if he has not had one previously.  Prior to Admission medications    Medication Sig Start Date End Date Taking? Authorizing Provider   apixaban (ELIQUIS) 5 MG tablet tablet TAKE 1 TABLET BY MOUTH 2 TIMES DAILY 10/8/17  Yes Aquiles Emanuel MD   aspirin 81 MG tablet Take 81 mg by mouth daily   Yes Aquiles Emanuel MD   atorvastatin (LIPITOR) 80 MG tablet Take 80 mg by mouth.   Yes Aquiles Emanuel MD   Cholecalciferol (VITAMIN D) 2000 UNITS capsule 1/2 tablet once a day   Yes Aquiles Emanuel MD   furosemide (LASIX) 80 MG tablet Take  by mouth.   Yes Aquiles Emanuel MD   isosorbide mononitrate (IMDUR) 60 MG 24 hr tablet Take 60 mg by mouth 2 (Two) Times a Day. 1/28/19  Yes Aquiles Emanuel MD   lisinopril (PRINIVIL,ZESTRIL) 2.5 MG tablet Take 2.5 mg by mouth. 10/12/19  Yes Aquiles Emanuel MD   metoprolol succinate XL (TOPROL-XL) 25 MG 24 hr tablet Take 25 mg by mouth 2 (two) times a day. Extended Release   Yes Aquiles Emanuel MD   nitroglycerin (NITROSTAT) 0.4  "MG SL tablet up to max of 3 total doses. If no relief after 1 dose, call 911. 18  Yes Provider, MD Aquiles   potassium chloride (K-DUR,KLOR-CON) 20 MEQ CR tablet Take  by mouth Daily.   Yes Provider, MD Aquiles   ranolazine (RANEXA) 1000 MG 12 hr tablet Take 1,000 mg by mouth 2 (two) times a day. 6/10/19  Yes Aquiles Emanuel MD   atorvastatin (LIPITOR) 80 MG tablet  6/4/21 6/15/22  Provider, MD Aquiles   Potassium 99 MG tablet Take  by mouth.  6/15/22  ProviderAquiles MD       Social History     Socioeconomic History   • Marital status:    Tobacco Use   • Smoking status: Former Smoker     Packs/day: 2.00     Years: 52.00     Pack years: 104.00     Quit date:      Years since quittin.4   • Smokeless tobacco: Never Used   Substance and Sexual Activity   • Alcohol use: No   • Drug use: No   • Sexual activity: Defer       Objective   Vital Signs:   /82   Pulse 70   Ht 182.9 cm (72\")   Wt 103 kg (226 lb)   SpO2 98% Comment: RA  BMI 30.65 kg/m²     Physical Exam  Vitals and nursing note reviewed.   Constitutional:       Appearance: He is obese.      Comments: His BMI is just into the obese range at 30.65.   HENT:      Head: Normocephalic.      Comments: He is wearing a mask.  Eyes:      Extraocular Movements: Extraocular movements intact.      Pupils: Pupils are equal, round, and reactive to light.   Cardiovascular:      Rate and Rhythm: Normal rate and regular rhythm.   Pulmonary:      Effort: Pulmonary effort is normal.      Breath sounds: Normal breath sounds.   Musculoskeletal:         General: Normal range of motion.   Skin:     General: Skin is warm and dry.   Neurological:      General: No focal deficit present.      Mental Status: He is alert and oriented to person, place, and time.   Psychiatric:         Mood and Affect: Mood normal.         Behavior: Behavior normal.        Result Review :          No results found for this or any previous visit.          "        My interpretation of imaging:    CT LUNG SCREENING (ANNUAL) (07/14/2021 15:40 EDT)  His last screening CT did not show any new or suspicious nodules.      Assessment and Plan     Diagnoses and all orders for this visit:    1. Simple chronic bronchitis (HCC) (Primary)  Assessment & Plan:  He is not having any significant issues with bronchitis at this time.      2. Centrilobular emphysema (HCC)  Assessment & Plan:  This has been noted on previous chest CT scans but by prior pulmonary functions he does not have COPD.          3. Paroxysmal atrial fibrillation (HCC)  Assessment & Plan:  His rhythm is regular today.  He will continue his Eliquis therapy.      4. Personal history of nicotine dependence  Assessment & Plan:  He quit smoking in 2013.  I will continue screening chest CTs on an annual basis as long as he meets criteria.    Orders:  -      CT Chest Low Dose Cancer Screening WO; Future    5. Obesity (BMI 30-39.9)  Assessment & Plan:  Patient's (Body mass index is 30.65 kg/m².) indicates that they are obese (BMI >30) with health conditions that include hypertension . Weight is unchanged.  Diet and exercise are encouraged and he will follow-up with his primary care physician regarding his elevated BMI otherwise.      6. Screening for malignant neoplasm of respiratory organ  -      CT Chest Low Dose Cancer Screening WO; Future        Drew Bonilla MD  6/15/2022  16:40 CDT    Follow Up   No follow-ups on file.    Patient was given instructions and counseling regarding his condition or for health maintenance advice. Please see specific information pulled into the AVS if appropriate.

## 2022-06-27 ENCOUNTER — TELEPHONE (OUTPATIENT)
Dept: PULMONOLOGY | Facility: CLINIC | Age: 72
End: 2022-06-27

## 2022-06-27 NOTE — TELEPHONE ENCOUNTER
Called patient to confirm/reschedule low dose chest ct scan appointment at [facility] on 07/15/22 at 2:40 pm.  No answer, no voicemail.

## 2022-07-11 DIAGNOSIS — Z95.810 AICD (AUTOMATIC CARDIOVERTER/DEFIBRILLATOR) PRESENT: Primary | ICD-10-CM

## 2022-07-11 DIAGNOSIS — I50.42 CHRONIC COMBINED SYSTOLIC AND DIASTOLIC HEART FAILURE (HCC): ICD-10-CM

## 2022-07-11 DIAGNOSIS — I25.5 ISCHEMIC CARDIOMYOPATHY: ICD-10-CM

## 2022-07-11 PROCEDURE — 93295 DEV INTERROG REMOTE 1/2/MLT: CPT | Performed by: NURSE PRACTITIONER

## 2022-07-11 PROCEDURE — 93296 REM INTERROG EVL PM/IDS: CPT | Performed by: NURSE PRACTITIONER

## 2022-07-15 ENCOUNTER — HOSPITAL ENCOUNTER (OUTPATIENT)
Dept: CT IMAGING | Age: 72
Discharge: HOME | End: 2022-07-15
Payer: MEDICARE

## 2022-07-15 DIAGNOSIS — Z87.891 PERSONAL HISTORY OF NICOTINE DEPENDENCE: ICD-10-CM

## 2022-07-15 DIAGNOSIS — Z12.2 SCREENING FOR MALIGNANT NEOPLASM OF RESPIRATORY ORGAN: ICD-10-CM

## 2022-07-15 PROCEDURE — 71271 CT THORAX LUNG CANCER SCR C-: CPT | Performed by: RADIOLOGY

## 2022-07-15 PROCEDURE — 71271 CT THORAX LUNG CANCER SCR C-: CPT

## 2022-07-18 DIAGNOSIS — Z87.891 PERSONAL HISTORY OF NICOTINE DEPENDENCE: ICD-10-CM

## 2022-07-18 DIAGNOSIS — Z12.2 SCREENING FOR MALIGNANT NEOPLASM OF RESPIRATORY ORGAN: ICD-10-CM

## 2022-09-20 DIAGNOSIS — I25.5 ISCHEMIC CARDIOMYOPATHY: ICD-10-CM

## 2022-09-20 DIAGNOSIS — I50.42 CHRONIC COMBINED SYSTOLIC AND DIASTOLIC HEART FAILURE (HCC): ICD-10-CM

## 2022-09-20 DIAGNOSIS — Z95.810 AICD (AUTOMATIC CARDIOVERTER/DEFIBRILLATOR) PRESENT: Primary | ICD-10-CM

## 2022-10-26 ENCOUNTER — OFFICE VISIT (OUTPATIENT)
Dept: CARDIOLOGY CLINIC | Age: 72
End: 2022-10-26
Payer: MEDICARE

## 2022-10-26 VITALS
SYSTOLIC BLOOD PRESSURE: 100 MMHG | DIASTOLIC BLOOD PRESSURE: 70 MMHG | HEART RATE: 46 BPM | HEIGHT: 72 IN | BODY MASS INDEX: 30.88 KG/M2 | WEIGHT: 228 LBS

## 2022-10-26 DIAGNOSIS — Z95.810 AICD (AUTOMATIC CARDIOVERTER/DEFIBRILLATOR) PRESENT: ICD-10-CM

## 2022-10-26 DIAGNOSIS — I25.5 ISCHEMIC CARDIOMYOPATHY: ICD-10-CM

## 2022-10-26 DIAGNOSIS — I50.42 CHRONIC COMBINED SYSTOLIC AND DIASTOLIC HEART FAILURE (HCC): Primary | ICD-10-CM

## 2022-10-26 DIAGNOSIS — Z79.01 CHRONIC ANTICOAGULATION: ICD-10-CM

## 2022-10-26 PROCEDURE — 93283 PRGRMG EVAL IMPLANTABLE DFB: CPT | Performed by: INTERNAL MEDICINE

## 2022-10-26 PROCEDURE — 3074F SYST BP LT 130 MM HG: CPT | Performed by: INTERNAL MEDICINE

## 2022-10-26 PROCEDURE — 99214 OFFICE O/P EST MOD 30 MIN: CPT | Performed by: INTERNAL MEDICINE

## 2022-10-26 PROCEDURE — 1123F ACP DISCUSS/DSCN MKR DOCD: CPT | Performed by: INTERNAL MEDICINE

## 2022-10-26 PROCEDURE — 3078F DIAST BP <80 MM HG: CPT | Performed by: INTERNAL MEDICINE

## 2022-10-26 ASSESSMENT — ENCOUNTER SYMPTOMS
SHORTNESS OF BREATH: 0
DIARRHEA: 0
VOMITING: 0
BLOOD IN STOOL: 0
COUGH: 0
BACK PAIN: 0
WHEEZING: 0
ABDOMINAL DISTENTION: 0
ABDOMINAL PAIN: 0

## 2022-10-26 NOTE — PROGRESS NOTES
[facility] [place]  Cardiology Office Note  [address] [address]  Phone: [phone]  Fax: [phone]                            Date:  10/26/2022  Patient: Christi Gandhi  Age:  67 y.o., 1950    Referral: No ref. provider found      PROBLEM LIST:    Patient Active Problem List    Diagnosis Date Noted    Ischemic cardiomyopathy 02/23/2021     Priority: High    Dizziness 11/04/2020     Priority: Low    PSVT (paroxysmal supraventricular tachycardia) ([facility] 09/24/2020     Priority: Low    PAC (premature atrial contraction) 09/24/2020     Priority: Low    Hx of CABG 08/13/2020     Priority: Low    Type 2 diabetes mellitus without complication, without long-term current use of insulin ([facility] 03/19/2020     Priority: Low     Overview Note:     Diet-controlled per patient. Recheck hemoglobin A1c and other labs. Obesity (BMI 30.0-34.9) 03/19/2020     Priority: Low     Overview Note:     Recommended at least 150 minutes of exercise per week and resistance training 2 days a week. Discussed healthy diet habits. Offered suggestions for calorie counting. Simple chronic bronchitis ([facility] 06/17/2019     Priority: Low     Overview Note:     Patient is followed by pulmonology Dr. Liberty Guillermo, annual low-dose CT, patient states he does not have COPD and documentation supports this.       Essential hypertension 04/26/2018     Priority: Low     Overview Note:     Stable, continue lisinopril, Imdur, Lopressor      Chronic anticoagulation 04/26/2018     Priority: Low     Overview Note:     Eliquis      Amaurosis fugax 10/18/2017     Priority: Low    Chronic combined systolic and diastolic heart failure (HCC)      Priority: Low    Sick sinus syndrome [facility])      Priority: Low    Paroxysmal atrial fibrillation ([facility]      Priority: Low    Chest pain 06/22/2016     Priority: Low    Ex-smoker      Priority: Low     Overview Note:     quit 2013 /smoked 50 yrs      S/P ablation of atrial fibrillation 11/11/2013     Priority: Low     Overview Note:     6/27/13      History of amiodarone therapy      Priority: Low    Mixed hyperlipidemia      Priority: Low     Overview Note:     Check lipids      Coronary artery disease involving native coronary artery of native heart      Priority: Low     Overview Note:     06/27/2013 CABG X4 LIMA-diag, VG-LAD, VG-OM, VG-PDA [facility])  11/10/2015  SE negative for myocardial ischemia  6/24/2016  TTE  Normal LVFX  7/2/2018 lexiscan Positive for inferior MI + myocardial ischemia, EF 50%, 12% ischemic myocardium on stress, intermediate risk findings, AUC indication 17, AUC score 7  7/11/18  Cath  Severe NVD, patent LIMA-LAD, patent yet extremely small VG-LAD, patent VG-RCA, closed VG-OM, anterior lateral akinesis, EF 35%  10/11/19  lexiscan Positive for inferior lateral myocardial ischemia, EF 51%, 1% ischemic myocardium on stress, low risk findings, AUC indication 15, AUC score 4, Hong Aguilar MD)   10/12/19            1. Coronary artery disease status post CABG 6/27/2013 with four-vessel grafting, LIMA to diagonal (patent), SVG to LAD (attenuated with small LAD) SVG to OM (occluded), SVG to RCA (patent), catheterization 10/12/2019 with occluded mid LAD, stenotic mid RCA, large inferior infarct, ejection fraction 35% ICD placement 2/2021.  2.  Paroxysmal atrial fibrillation status post prior ablations x2, on Eliquis. 3.  Diabetes mellitus type 2.  4.  Hypertension. 5.  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 PRESENTATION: Deniz Castro is a 67y.o. year old male presents for follow-up evaluation. He has been doing quite well with no significant issues. No chest pain or shortness of breath. Continues to do all his regular activities without discomfort. Occasional mild leg swelling. Did have a urinary infection with some hematuria which resolved.   Will be going to [facility] and driving for 14 hours for his 50th wedding anniversary. Did wake up 1 night and thought he had a mild shock from his device but no therapies delivered. No palpitations. REVIEW OF SYSTEMS:  Review of Systems   Constitutional:  Negative for activity change, diaphoresis and fatigue. HENT:  Negative for hearing loss, nosebleeds and tinnitus. Eyes:  Negative for visual disturbance. Respiratory:  Negative for cough, shortness of breath and wheezing. Cardiovascular:  Negative for chest pain, palpitations and leg swelling. Gastrointestinal:  Negative for abdominal distention, abdominal pain, blood in stool, diarrhea and vomiting. Endocrine: Negative for cold intolerance, heat intolerance, polydipsia, polyphagia and polyuria. Genitourinary:  Negative for difficulty urinating, flank pain and hematuria. Musculoskeletal:  Negative for arthralgias, back pain, joint swelling and myalgias. Skin:  Negative for pallor and rash. Neurological:  Negative for dizziness, seizures, syncope and headaches. Psychiatric/Behavioral:  Negative for behavioral problems and dysphoric mood. The patient is not nervous/anxious. Past Medical History:      Diagnosis Date    Atrial fibrillation [facility])     Atrial flutter ([facility]     CAD (coronary artery disease)     CAD (coronary atherosclerotic disease)     Diabetes mellitus ([facility] [facility]     diet controlled    Ex-smoker     quit 2013 /smoked 50 yrs    History of amiodarone therapy     Hyperlipidemia     [facility]      Hypertension     Hypokalemia     Hypotension     MI (myocardial infarction) ([facility] [facility]     S/P CABG x 4 11/11/2013 6/27/13    Stroke ([facility] 08/22/2017    eye       Past Surgical History:      Procedure Laterality Date    ABLATION OF DYSRHYTHMIC FOCUS      CARDIAC CATHETERIZATION  6/27/13  MDL    EF 45%    CARDIAC SURGERY      CABG x 4    CATARACT REMOVAL WITH IMPLANT Bilateral     COLONOSCOPY N/A 6/16/2020    Dr Jenifer Face Jerryl Cooks x 1, AP x 1, 5 yr recall    CORONARY ARTERY BYPASS GRAFT  6/27/2013 Emergency CABG x 4, LIMA-DIAG, SVG-LAD, SVG-OM, SVG-PDA, RT [facility], DR DOLAN    CYST INCISION AND DRAINAGE N/A     RECTAL    EYE SURGERY      EYE SURGERY      cataract sx and implants (both eyes)    OTHER SURGICAL HISTORY      heart ablation    RETROPHYARYNGEAL ABCESS INCISION/DRAIN      Abcess near rectum       Medications:  Current Outpatient Medications   Medication Sig Dispense Refill    isosorbide mononitrate (IMDUR) 60 MG extended release tablet Take (1) tab AM and 1-1/2 tab  tablet 3    lisinopril (PRINIVIL;ZESTRIL) 2.5 MG tablet Take 1 tablet by mouth daily 30 tablet 3    ranolazine (RANEXA) 1000 MG extended release tablet Take 1 tablet by mouth 2 times daily 16 tablet 0    metoprolol tartrate (LOPRESSOR) 25 MG tablet Take 1 tablet by mouth 2 times daily 60 tablet 0    nitroGLYCERIN (NITROSTAT) 0.4 MG SL tablet up to max of 3 total doses. If no relief after 1 dose, call 911. 25 tablet 5    potassium chloride (KLOR-CON) 20 MEQ packet Take 1/2 tablet daily      atorvastatin (LIPITOR) 80 MG tablet Take 1 tablet by mouth nightly (Patient taking differently: Take 80 mg by mouth every morning) 90 tablet 3    ELIQUIS 5 MG TABS tablet TAKE 1 TABLET BY MOUTH 2 TIMES DAILY  3    furosemide (LASIX) 80 MG tablet Take 1 tablet by mouth daily 90 tablet 3    aspirin 81 MG tablet Take 81 mg by mouth daily      Cholecalciferol (VITAMIN D) 2000 UNITS CAPS capsule Take 1/2 tablet daily      pantoprazole (PROTONIX) 20 MG tablet Take 20 mg by mouth daily (Patient not taking: Reported on 10/26/2022)       No current facility-administered medications for this visit.        Allergies:  Amiodarone, Azithromycin, Histamine, Pcn [penicillins], Penicillins, and Unable to assess    Past Social History:  Social History     Socioeconomic History    Marital status:      Spouse name: Not on file    Number of children: Not on file    Years of education: Not on file    Highest education level: Not on file   Occupational History Not on file   Tobacco Use    Smoking status: Former     Packs/day: 1.00     Years: 15.00     Pack years: 15.00     Types: Cigarettes     Quit date: 2013     Years since quittin.3    Smokeless tobacco: Never   Vaping Use    Vaping Use: Never used   Substance and Sexual Activity    Alcohol use: No    Drug use: No    Sexual activity: Yes     Partners: Female   Other Topics Concern    Not on file   Social History Narrative    ** Merged History Encounter **          Social Determinants of Health     Financial Resource Strain: Not on file   Food Insecurity: Not on file   Transportation Needs: Not on file   Physical Activity: Not on file   Stress: Not on file   Social Connections: Not on file   Intimate Partner Violence: Not on file   Housing Stability: Not on file       Family History:       Problem Relation Age of Onset    Stroke Father     Heart Disease Father     High Blood Pressure Father     High Cholesterol Father     Other Father         \"swelling on vein behind stomach\"    Heart Disease Other     Colon Cancer Neg Hx     Colon Polyps Neg Hx     Esophageal Cancer Neg Hx     Liver Cancer Neg Hx     Liver Disease Neg Hx     Rectal Cancer Neg Hx     Stomach Cancer Neg Hx          Physical Examination:  /70   Pulse (!) 46   Ht 6' (1.829 m)   Wt 228 lb (103.4 kg)   BMI 30.92 kg/m²   Physical Exam  Constitutional:       Comments: Moderate truncal obesity  Blood pressure right arm sitting 120/80 mmHg, pulse 70 bpm regular   HENT:      Mouth/Throat:      Pharynx: No oropharyngeal exudate. Eyes:      General: No scleral icterus. Right eye: No discharge. Left eye: No discharge. Neck:      Thyroid: No thyromegaly. Vascular: No carotid bruit or JVD. Cardiovascular:      Rate and Rhythm: Normal rate and regular rhythm. Heart sounds: No murmur heard. No friction rub. No gallop.       Comments: No JVD  No pitting edema  No significant systolic or diastolic murmurs noted  Pulmonary:      Effort: No respiratory distress. Breath sounds: No stridor. No wheezing or rales. Abdominal:      General: Bowel sounds are normal. There is no distension. Palpations: Abdomen is soft. There is no mass. Tenderness: There is no abdominal tenderness. There is no guarding or rebound. Comments: Soft, nontender  No palpable organomegaly   Musculoskeletal:         General: No deformity. Skin:     General: Skin is warm. Coloration: Skin is not pale. Findings: No erythema or rash. Neurological:      Mental Status: He is alert and oriented to person, place, and time. Motor: No abnormal muscle tone. Coordination: Coordination normal.      Deep Tendon Reflexes: Reflexes normal.         Labs:   CBC: No results for input(s): WBC, HGB, HCT, PLT in the last 72 hours. BMP:No results for input(s): NA, K, CO2, BUN, CREATININE, LABGLOM, GLUCOSE in the last 72 hours. BNP: No results for input(s): BNP in the last 72 hours. PT/INR: No results for input(s): PROTIME, INR in the last 72 hours. APTT:No results for input(s): APTT in the last 72 hours. CARDIAC ENZYMES:No results for input(s): CKTOTAL, CKMB, CKMBINDEX, TROPONINI in the last 72 hours. FASTING LIPID PANEL:  Lab Results   Component Value Date/Time    HDL 47 03/20/2020 08:45 AM    LDLDIRECT 84 11/07/2015 10:05 AM    LDLCALC 61 03/20/2020 08:45 AM    TRIG 91 03/20/2020 08:45 AM     LIVER PROFILE:No results for input(s): AST, ALT, LABALBU in the last 72 hours.         Imaging:          ASSESSMENT and PLAN:    67year-old gentleman with past medical history of coronary artery disease, prior CABG 6/2013 with four-vessel grafting, infarcted inferior wall with ejection fraction 35%, patent LIMA to diagonal, attenuated SVG to small LAD, occluded SVG to OM, patent SVG to RCA with infarcted inferior wall, diabetes mellitus type 2, hypertension, paroxysmal atrial fibrillation with prior ablations x2, on Eliquis, status post ICD placement 2/23/2021 here for follow-up evaluation. 1.  Doing well with no significant issues since his last visit. No therapies delivered. No recurrent atrial fibrillation on device interrogation. 2.  Device functioning well with no significant issues. Good battery life. Normal lead impedances and thresholds. 2.  Advised on diet and activity. Advised on long trips driving to stop, get out and stretch his legs intermittently. No evidence of volume retention clinically. Well compensated from cardiac perspective. 3.  Can follow-up with nurse practitioner in 6 months and with me in 1 year. Orders:  No orders of the defined types were placed in this encounter. No orders of the defined types were placed in this encounter. Return for NP 6 mths; me 1 year. Electronically signed by Suad Angulo MD on 10/26/2022 at 9:36 AM    [facility]      Thisdictation was generated by voice recognition computer software. Although all attempts are made to edit the dictation for accuracy, there may be errors in the transcription that are not intended.

## 2022-10-26 NOTE — PROGRESS NOTES
ICD interrogated  Presenting rhythm: AS VS, AP 7.3%,  0.1%  Battery status: 9.7 years  Lead status: lead impedance within range and stable  Sensing: P waves 2.1 mV,  R waves 8.0 mV  Thresholds:  Atrial 1.25 V @ 0.4ms, ventricular 0.75 @ 0.4ms  Observations:  none  Reprogramming for sensitivity and threshold testing  Next [facility] home check scheduled for 1/26/23

## 2022-12-08 NOTE — ED NOTES
Introduced self to pt. Pt is resting comfortably in bed. Pt shows no s/s of distress. Call light is in reach of pt and pt encouraged to call if he needs anything. Informed pt we are just waiting for him room to be cleaned and it's 719. White board updated.       Srikanth Allen RN  07/10/18 2090 Pt to ER c/o wound to abdominal fold area x2 -3 weeks.  Pt reports she had yeast to her abdominal fold; was started on nystatin. Had reaction to nystatin states skin became red and hot. Stopped using nystatin and started using bactroban and domboro soak. Pt states it looks better but now is left with a \"hole\" in her abdominal fold. Has appt to see wound care next Wednesday, but pt was concerned to wait that long. Denies fever. Denies purulent drainage

## 2023-01-26 DIAGNOSIS — I25.5 ISCHEMIC CARDIOMYOPATHY: ICD-10-CM

## 2023-01-26 DIAGNOSIS — Z95.810 AICD (AUTOMATIC CARDIOVERTER/DEFIBRILLATOR) PRESENT: Primary | ICD-10-CM

## 2023-01-26 DIAGNOSIS — I50.42 CHRONIC COMBINED SYSTOLIC AND DIASTOLIC HEART FAILURE (HCC): ICD-10-CM

## 2023-01-26 PROCEDURE — 93296 REM INTERROG EVL PM/IDS: CPT | Performed by: CLINICAL NURSE SPECIALIST

## 2023-01-26 PROCEDURE — 93295 DEV INTERROG REMOTE 1/2/MLT: CPT | Performed by: CLINICAL NURSE SPECIALIST

## 2023-04-26 ENCOUNTER — OFFICE VISIT (OUTPATIENT)
Dept: CARDIOLOGY CLINIC | Age: 73
End: 2023-04-26
Payer: MEDICARE

## 2023-04-26 VITALS
WEIGHT: 234 LBS | BODY MASS INDEX: 31.69 KG/M2 | DIASTOLIC BLOOD PRESSURE: 78 MMHG | HEART RATE: 62 BPM | HEIGHT: 72 IN | OXYGEN SATURATION: 99 % | SYSTOLIC BLOOD PRESSURE: 122 MMHG

## 2023-04-26 DIAGNOSIS — I49.5 SICK SINUS SYNDROME (HCC): ICD-10-CM

## 2023-04-26 DIAGNOSIS — E78.2 MIXED HYPERLIPIDEMIA: ICD-10-CM

## 2023-04-26 DIAGNOSIS — I10 ESSENTIAL HYPERTENSION: ICD-10-CM

## 2023-04-26 DIAGNOSIS — I48.0 PAROXYSMAL ATRIAL FIBRILLATION (HCC): ICD-10-CM

## 2023-04-26 DIAGNOSIS — I25.5 ISCHEMIC CARDIOMYOPATHY: ICD-10-CM

## 2023-04-26 DIAGNOSIS — Z95.1 HX OF CABG: ICD-10-CM

## 2023-04-26 DIAGNOSIS — I25.10 CORONARY ARTERY DISEASE INVOLVING NATIVE CORONARY ARTERY OF NATIVE HEART WITHOUT ANGINA PECTORIS: Primary | ICD-10-CM

## 2023-04-26 DIAGNOSIS — Z95.810 AICD (AUTOMATIC CARDIOVERTER/DEFIBRILLATOR) PRESENT: ICD-10-CM

## 2023-04-26 PROCEDURE — 1123F ACP DISCUSS/DSCN MKR DOCD: CPT | Performed by: NURSE PRACTITIONER

## 2023-04-26 PROCEDURE — 3078F DIAST BP <80 MM HG: CPT | Performed by: NURSE PRACTITIONER

## 2023-04-26 PROCEDURE — 93283 PRGRMG EVAL IMPLANTABLE DFB: CPT | Performed by: NURSE PRACTITIONER

## 2023-04-26 PROCEDURE — 3074F SYST BP LT 130 MM HG: CPT | Performed by: NURSE PRACTITIONER

## 2023-04-26 PROCEDURE — 99214 OFFICE O/P EST MOD 30 MIN: CPT | Performed by: NURSE PRACTITIONER

## 2023-04-26 NOTE — PROGRESS NOTES
[facility], [place]. [address], [facility] [address] [address] [facility]  [phone] office  [phone] fax      OFFICE VISIT:  2023    Bre Ellison - : 1950  Reason For Visit:  Babatunde Patterson is a 67 y.o. male who is here for 6 Month Follow-Up, Coronary Artery Disease, Congestive Heart Failure, and Cardiomyopathy    History:   Diagnosis Orders   1. Coronary artery disease involving native coronary artery of native heart without angina pectoris        2. Ischemic cardiomyopathy        3. Hx of CABG        4. AICD (automatic cardioverter/defibrillator) present        5. Essential hypertension        6. Paroxysmal atrial fibrillation (HCC)        7. Sick sinus syndrome ([address]        8. Mixed hyperlipidemia          The patient presents today for cardiology follow up and AICD interrogation. He is a 67year old with history of  coronary artery disease status post CABG 2013 with four-vessel grafting, LIMA to diagonal (patent), SVG to LAD (attenuated with small LAD) SVG to OM (occluded), SVG to RCA (patent), catheterization 10/12/2019 with occluded mid LAD, stenotic mid RCA, large inferior infarct, ejection fraction 35% ICD place- ment 2021. As well as paroxysmal atrial fibrillation status post prior ablations x 2 on Eliquis. The patient is doing well without angina, shortness of breath, fluid retention or AICD discharge. BP is well controlled on current regimen. The patient's PCP monitors cholesterol. Subjective  Silvana Gins E denies exertional chest pain, shortness of breath, orthopnea, paroxysmal nocturnal dyspnea, syncope, presyncope, sensed arrhythmia, edema and fatigue. The patient denies numbness or weakness to suggest cerebrovascular accident or transient ischemic attack.       Bre Ellison has the following history as recorded in [facility]:  Patient Active Problem List   Diagnosis Code    Coronary artery disease involving native

## 2023-04-26 NOTE — PATIENT INSTRUCTIONS
New instructions for today:  Weigh yourself daily without clothing at the same time each day. Record a daily weight log. Call the office if you gain 3 pounds or more in 2 to 3 days or 5 pounds in one week. A sudden weight gain may mean that your heart failure is getting worse. Sodium causes your body to hold on to extra water. This may cause your heart failure symptoms to get worse. Limit sodium to 2,000 milligrams (mg) a day or less. That is less than 1 teaspoon of salt a day, including all the salt you eat in cooking or in packaged foods. Fluid restriction of 1500ml per day (about 6 cups of fluid per day). Eliquis can increase your risk of bleeding. If you notice blood in urine or stool, bleeding gums, excessive bruising or cough productive of bloody sputum, notify the office. Information on this blood thinner has been included in your after visit summary. Patient Instructions:  Continue current medications as prescribed. Always keep a current medication list. Bring your medications to every office visit. Continue to follow up with primary care provider for non cardiac medical problems. Call the office with any problems, questions or concerns at [phone]. If you have been asked to keep a blood pressure log, do so for 2 weeks. Call the office to report readings to the triage nurse at [phone]. Follow up with cardiologist as scheduled. The following educational material has been included in this after visit summary for your review: Life simple 7. Heart health. Life simple 7  1) Manage blood pressure - high blood pressure is a major risk factor for heart disease and stroke. Keeping blood pressure in health range reduces strain on your heart, arteries and kidneys. Blood pressure goal is less than 130/80. 2) Control cholesterol - contributes to plaque, which can clog arteries and lead to heart disease and stroke.  When you control your cholesterol you are giving your

## 2023-06-15 ENCOUNTER — OFFICE VISIT (OUTPATIENT)
Dept: PULMONOLOGY | Facility: CLINIC | Age: 73
End: 2023-06-15
Payer: MEDICARE

## 2023-06-15 VITALS
HEART RATE: 58 BPM | SYSTOLIC BLOOD PRESSURE: 128 MMHG | BODY MASS INDEX: 31.83 KG/M2 | WEIGHT: 235 LBS | HEIGHT: 72 IN | OXYGEN SATURATION: 99 % | DIASTOLIC BLOOD PRESSURE: 80 MMHG

## 2023-06-15 DIAGNOSIS — J41.0 SIMPLE CHRONIC BRONCHITIS: Primary | Chronic | ICD-10-CM

## 2023-06-15 DIAGNOSIS — J43.2 CENTRILOBULAR EMPHYSEMA: Chronic | ICD-10-CM

## 2023-06-15 DIAGNOSIS — E66.9 OBESITY (BMI 30-39.9): Chronic | ICD-10-CM

## 2023-06-15 DIAGNOSIS — Z87.891 PERSONAL HISTORY OF NICOTINE DEPENDENCE: Chronic | ICD-10-CM

## 2023-06-15 DIAGNOSIS — Z12.2 SCREENING FOR MALIGNANT NEOPLASM OF RESPIRATORY ORGAN: ICD-10-CM

## 2023-06-15 PROCEDURE — 1160F RVW MEDS BY RX/DR IN RCRD: CPT | Performed by: INTERNAL MEDICINE

## 2023-06-15 PROCEDURE — 99214 OFFICE O/P EST MOD 30 MIN: CPT | Performed by: INTERNAL MEDICINE

## 2023-06-15 PROCEDURE — 1159F MED LIST DOCD IN RCRD: CPT | Performed by: INTERNAL MEDICINE

## 2023-06-15 RX ORDER — PANTOPRAZOLE SODIUM 20 MG/1
1 TABLET, DELAYED RELEASE ORAL DAILY
COMMUNITY

## 2023-06-15 NOTE — PROGRESS NOTES
Chief Complaint  simple chronic bronchitis    Subjective    History of Present Illness {  Problem List  Visit Diagnosis   Encounters  Notes  Medications  Labs  Result Review Imaging  Media: 23}    Eric Ramey presents to [facility] for simple chronic bronchitis.    History of Present Illness   The patient overall is doing well from pulmonary standpoint.  He states since he had cardiac surgery about 10 years ago he has noticed occasionally a sensation of something in his throat area just above the sternal notch.  He has had various endoscopic procedures with no abnormalities noted.  This may relate to previous intubations.  At any rate he is doing well overall otherwise and we will plan on a screening chest CT on or after July 15 at [facility] and call him with results.  If this shows no new or suspicious nodules I will just see him back in 1 year.  Had the COVID-19 vaccine in the form of the Moderna vaccine and also has received a bivalent Moderna booster.  He had the flu shot this past fall and has had both a Prevnar 13 and an unspecified pneumococcal vaccine in the past.  Prior to Admission medications    Medication Sig Start Date End Date Taking? Authorizing Provider   apixaban (ELIQUIS) 5 MG tablet tablet TAKE 1 TABLET BY MOUTH 2 TIMES DAILY 10/8/17  Yes ProviderAquiles MD   aspirin 81 MG tablet Take 81 mg by mouth daily   Yes Provider, MD Aquiles   atorvastatin (LIPITOR) 80 MG tablet Take 1 tablet by mouth.   Yes ProviderAquiles MD   Cholecalciferol (VITAMIN D) 2000 UNITS capsule 1/2 tablet once a day   Yes Provider, MD Aquiles   furosemide (LASIX) 80 MG tablet Take  by mouth.   Yes ProviderAquiles MD   isosorbide mononitrate (IMDUR) 60 MG 24 hr tablet Take 1 tablet by mouth 2 (Two) Times a Day. 1/28/19  Yes ProviderAquiles MD   lisinopril (PRINIVIL,ZESTRIL) 2.5 MG tablet Take 1 tablet by mouth. 10/12/19  Yes Provider  "MD Aquiles   metoprolol succinate XL (TOPROL-XL) 25 MG 24 hr tablet Take 1 tablet by mouth 2 (two) times a day. Extended Release   Yes Aquiles Emanuel MD   nitroglycerin (NITROSTAT) 0.4 MG SL tablet up to max of 3 total doses. If no relief after 1 dose, call 911. 12/27/18  Yes Aquiles Emanuel MD   pantoprazole (PROTONIX) 20 MG EC tablet Take 1 tablet by mouth Daily.   Yes Aquiles Emanuel MD   potassium chloride (K-DUR,KLOR-CON) 20 MEQ CR tablet Take  by mouth Daily.   Yes Aquiles Emanuel MD   ranolazine (RANEXA) 1000 MG 12 hr tablet Take 1 tablet by mouth 2 (two) times a day. 6/10/19  Yes Aquiles Emanuel MD       Social History     Socioeconomic History    Marital status:    Tobacco Use    Smoking status: Former     Packs/day: 2.00     Years: 52.00     Pack years: 104.00     Types: Cigarettes     Quit date: 2013     Years since quitting: 10.4    Smokeless tobacco: Never   Substance and Sexual Activity    Alcohol use: No    Drug use: No    Sexual activity: Defer       Objective   Vital Signs:   /80   Pulse 58   Ht 182.9 cm (72\")   Wt 107 kg (235 lb)   SpO2 99% Comment: RA  BMI 31.87 kg/m²     Physical Exam  Vitals and nursing note reviewed.   Constitutional:       Appearance: He is obese.      Comments: He is mildly bradycardic with a pulse of 58.   HENT:      Head: Normocephalic.   Eyes:      Extraocular Movements: Extraocular movements intact.      Pupils: Pupils are equal, round, and reactive to light.   Cardiovascular:      Rate and Rhythm: Regular rhythm. Bradycardia present.      Comments: He again is mildly bradycardic with a pulse of 58.  Pulmonary:      Effort: Pulmonary effort is normal.      Breath sounds: Normal breath sounds.   Musculoskeletal:         General: Normal range of motion.   Skin:     General: Skin is warm and dry.   Neurological:      General: No focal deficit present.      Mental Status: He is alert and oriented to person, place, and time. "   Psychiatric:         Mood and Affect: Mood normal.         Behavior: Behavior normal.      Result Review :          No results found for this or any previous visit.                 My interpretation of imaging:    CT lung screen [Initial/Annual] (07/15/2022 16:33 EDT)         Assessment and Plan {CC Problem List  Visit Diagnosis  ROS  Review (Popup)  Health Maintenance  Quality  BestPractice  Medications  SmartSets  SnapShot Encounters  Media : 23}    Diagnoses and all orders for this visit:    1. Simple chronic bronchitis (Primary)  Assessment & Plan:  Not had a recent problems of bronchitis.  If he has a productive cough he can utilize Mucinex over-the-counter and if he develops any purulent sputum production he can contact the office for antibiotics.      2. Centrilobular emphysema  Assessment & Plan:  This has been noted on prior imaging studies but he has not had evidence of COPD on prior pulmonary functions.          3. Personal history of nicotine dependence  Assessment & Plan:  He quit smoking in 2013.    Orders:  -     Cancel:  CT Chest Low Dose Cancer Screening WO; Future  -      CT Chest Low Dose Cancer Screening WO; Future    4. Obesity (BMI 30-39.9)  Assessment & Plan:  Patient's (Body mass index is 31.87 kg/m².) indicates that they are obese (BMI >30) with health conditions that include coronary heart disease . Weight is worsening.  Diet and exercise are encouraged and he will follow-up with his primary care physician regarding his elevated BMI otherwise.      5. Screening for malignant neoplasm of respiratory organ  -     Cancel:  CT Chest Low Dose Cancer Screening WO; Future  -      CT Chest Low Dose Cancer Screening WO; Future          Drew Bonilla MD  6/15/2023  13:32 CDT    Follow Up   Return in about 1 year (around 6/15/2024) for To see me specifically.    Patient was given instructions and counseling regarding his condition or for health maintenance advice. Please see  specific information pulled into the AVS if appropriate.

## 2023-06-15 NOTE — ASSESSMENT & PLAN NOTE
Not had a recent problems of bronchitis.  If he has a productive cough he can utilize Mucinex over-the-counter and if he develops any purulent sputum production he can contact the office for antibiotics.

## 2023-06-15 NOTE — PATIENT INSTRUCTIONS
The patient is doing well clinically.  He would be due for screening chest CT or after July 15 of this year and I will schedule that to be performed at [facility] and call him with results.  If that shows no new or suspicious lesions we will just see him back in 1 year.

## 2023-06-15 NOTE — ASSESSMENT & PLAN NOTE
This has been noted on prior imaging studies but he has not had evidence of COPD on prior pulmonary functions.

## 2023-06-15 NOTE — ASSESSMENT & PLAN NOTE
Patient's (Body mass index is 31.87 kg/m².) indicates that they are obese (BMI >30) with health conditions that include coronary heart disease . Weight is worsening.  Diet and exercise are encouraged and he will follow-up with his primary care physician regarding his elevated BMI otherwise.

## 2023-07-27 DIAGNOSIS — I25.5 ISCHEMIC CARDIOMYOPATHY: ICD-10-CM

## 2023-07-27 DIAGNOSIS — I49.5 SICK SINUS SYNDROME (HCC): ICD-10-CM

## 2023-07-27 DIAGNOSIS — Z95.810 AICD (AUTOMATIC CARDIOVERTER/DEFIBRILLATOR) PRESENT: Primary | ICD-10-CM

## 2023-07-27 PROCEDURE — 93295 DEV INTERROG REMOTE 1/2/MLT: CPT | Performed by: CLINICAL NURSE SPECIALIST

## 2023-07-27 PROCEDURE — 93296 REM INTERROG EVL PM/IDS: CPT | Performed by: CLINICAL NURSE SPECIALIST

## 2023-08-11 ENCOUNTER — HOSPITAL ENCOUNTER (OUTPATIENT)
Dept: CT IMAGING | Age: 73
Discharge: HOME | End: 2023-08-11
Payer: MEDICARE

## 2023-08-11 DIAGNOSIS — F17.211 CIGARETTE NICOTINE DEPENDENCE IN REMISSION: ICD-10-CM

## 2023-08-11 PROCEDURE — 71271 CT THORAX LUNG CANCER SCR C-: CPT

## 2023-09-07 NOTE — PATIENT INSTRUCTIONS
DC Transport Scheduled    Received request at: 9/7/2023 at 1249    Transport Company Scheduled:  [employer]    Scheduled Date: 9/7/2023  Scheduled Time: 1600    Destination: Home to [address] [facility]  [id number] [facility]     Notified care team of scheduled transport via Voalte.     If there are any changes needed to the DC transportation scheduled, please contact [facility] at ext. [phone] between the hours of 5329-1162 Mon-Fri. If outside those hours, contact the ED Case Manager at ext. [phone].      The patient still has cough intermittently.  He states he is had this to some extent since he had his heart attack.  He attributes most of it to issues with sinus problems at present.  Otherwise he is doing well.  We will schedule him for a follow-up screening CT at [facility] sometime in early July and call him with results.  Assuming it shows no new lesions or suspicious nodules we will just plan on a follow-up in 1 year.

## 2023-09-18 DIAGNOSIS — Z95.810 AICD (AUTOMATIC CARDIOVERTER/DEFIBRILLATOR) PRESENT: Primary | ICD-10-CM

## 2023-09-18 DIAGNOSIS — I48.0 PAROXYSMAL ATRIAL FIBRILLATION (HCC): ICD-10-CM

## 2023-09-18 DIAGNOSIS — I49.5 SICK SINUS SYNDROME (HCC): ICD-10-CM

## 2023-09-20 ENCOUNTER — OFFICE VISIT (OUTPATIENT)
Dept: CARDIOLOGY CLINIC | Age: 73
End: 2023-09-20
Payer: MEDICARE

## 2023-09-20 VITALS
WEIGHT: 222 LBS | DIASTOLIC BLOOD PRESSURE: 74 MMHG | BODY MASS INDEX: 30.07 KG/M2 | SYSTOLIC BLOOD PRESSURE: 120 MMHG | HEART RATE: 73 BPM | HEIGHT: 72 IN

## 2023-09-20 DIAGNOSIS — I49.5 SICK SINUS SYNDROME (HCC): ICD-10-CM

## 2023-09-20 DIAGNOSIS — I48.91 ATRIAL FIBRILLATION, UNSPECIFIED TYPE (HCC): Primary | ICD-10-CM

## 2023-09-20 DIAGNOSIS — I25.10 CORONARY ARTERY DISEASE INVOLVING NATIVE CORONARY ARTERY OF NATIVE HEART, UNSPECIFIED WHETHER ANGINA PRESENT: ICD-10-CM

## 2023-09-20 DIAGNOSIS — Z86.79 S/P ABLATION OF ATRIAL FIBRILLATION: ICD-10-CM

## 2023-09-20 DIAGNOSIS — Z79.01 CHRONIC ANTICOAGULATION: ICD-10-CM

## 2023-09-20 DIAGNOSIS — Z95.810 AICD (AUTOMATIC CARDIOVERTER/DEFIBRILLATOR) PRESENT: ICD-10-CM

## 2023-09-20 DIAGNOSIS — I25.5 ISCHEMIC CARDIOMYOPATHY: ICD-10-CM

## 2023-09-20 DIAGNOSIS — R07.9 CHEST PAIN, UNSPECIFIED TYPE: ICD-10-CM

## 2023-09-20 DIAGNOSIS — Z95.1 HX OF CABG: ICD-10-CM

## 2023-09-20 DIAGNOSIS — I10 ESSENTIAL HYPERTENSION: ICD-10-CM

## 2023-09-20 DIAGNOSIS — Z98.890 S/P ABLATION OF ATRIAL FIBRILLATION: ICD-10-CM

## 2023-09-20 DIAGNOSIS — E78.2 MIXED HYPERLIPIDEMIA: ICD-10-CM

## 2023-09-20 PROCEDURE — 93280 PM DEVICE PROGR EVAL DUAL: CPT | Performed by: NURSE PRACTITIONER

## 2023-09-20 PROCEDURE — 99214 OFFICE O/P EST MOD 30 MIN: CPT | Performed by: NURSE PRACTITIONER

## 2023-09-20 PROCEDURE — 1123F ACP DISCUSS/DSCN MKR DOCD: CPT | Performed by: NURSE PRACTITIONER

## 2023-09-20 PROCEDURE — 3078F DIAST BP <80 MM HG: CPT | Performed by: NURSE PRACTITIONER

## 2023-09-20 PROCEDURE — 3074F SYST BP LT 130 MM HG: CPT | Performed by: NURSE PRACTITIONER

## 2023-09-20 RX ORDER — EMPAGLIFLOZIN 25 MG/1
25 TABLET, FILM COATED ORAL DAILY
COMMUNITY
Start: 2023-09-14

## 2023-09-20 NOTE — PROGRESS NOTES
AICD interrogation shows onset of AF 9/14/23. At 20:28 pm.   Continues on Lopressor and Eliquis. No missed doses of Eliquis reported. Patient symptomatic with dizziness and fatigue. Scheduled outpatient DCCV with Dr. Rome Quintanilla today. Jeannette Galindo MA provided instructions. CAD s/p CABG - stable random chest pain reported. Schedule Lexiscan rx to rule out ischemia. Continue medical management to include metoprolol, Ranexa, Lisinopril, Lipitor and ASA. Systolic HF - NYHA class II stage C  GDMT includes metoprolol, Lisinopril, Lasix and Jardiance. Currently euvolemic. No AICD discharges. Optivol baseline. Continue same. HTN - normotensive on current regimen. BP today 120/74. Continue same. Hyperlipidemia - continues on Lipitor 80 mg daily. LDL 61. Continue same. Patient is compliant with medication regimen. Previous cardiac history and records reviewed. Continue current medical management for cardiac related condition. Continue other current medications as directed. Continue to follow up with primary care provider for non cardiac medical problems. If your primary care provider is outside of the [facility], please request that your labs be faxed to this office at [phone]. BP goal 130/80 or less. Call the office with any problems, questions or concerns at [phone]. Cardiology follow up as scheduled in [address] [facility] 15 appointments. Educational included in patient instructions. Heart health. DCCV.       BERNARDO Merino

## 2023-09-29 ENCOUNTER — TELEPHONE (OUTPATIENT)
Dept: PULMONOLOGY | Facility: CLINIC | Age: 73
End: 2023-09-29
Payer: MEDICARE

## 2023-09-29 NOTE — TELEPHONE ENCOUNTER
Spoke with patient regarding overdue CT of Chest ordered by Dr. Bonilla. Patient would like to have this scheduled at [facility] please. Thank you!

## 2023-09-29 NOTE — TELEPHONE ENCOUNTER
When I called [facility] to schedule.  Patient did have done and results are now updated and the report is in the chart.

## 2023-10-01 NOTE — H&P
Patient:  Cholo Thurman                  1950  MRN: [id number]    PROBLEM LIST:    Patient Active Problem List    Diagnosis Date Noted    Ischemic cardiomyopathy 02/23/2021     Priority: High    Dizziness 11/04/2020     Priority: Low    PSVT (paroxysmal supraventricular tachycardia) 09/24/2020     Priority: Low    PAC (premature atrial contraction) 09/24/2020     Priority: Low    Hx of CABG 08/13/2020     Priority: Low    Type 2 diabetes mellitus without complication, without long-term current use of insulin ([address]) 03/19/2020     Priority: Low     Overview Note:     Diet-controlled per patient. Recheck hemoglobin A1c and other labs. Obesity (BMI 30.0-34.9) 03/19/2020     Priority: Low     Overview Note:     Recommended at least 150 minutes of exercise per week and resistance training 2 days a week. Discussed healthy diet habits. Offered suggestions for calorie counting. Simple chronic bronchitis ([address]) 06/17/2019     Priority: Low     Overview Note:     Patient is followed by pulmonology Dr. Rosita Pena, annual low-dose CT, patient states he does not have COPD and documentation supports this.       Essential hypertension 04/26/2018     Priority: Low     Overview Note:     Stable, continue lisinopril, Imdur, Lopressor      Chronic anticoagulation 04/26/2018     Priority: Low     Overview Note:     Eliquis      Amaurosis fugax 10/18/2017     Priority: Low    Chronic combined systolic and diastolic heart failure (HCC)      Priority: Low    Sick sinus syndrome [facility])      Priority: Low    Paroxysmal atrial fibrillation ([address])      Priority: Low    Chest pain 06/22/2016     Priority: Low    Ex-smoker      Priority: Low     Overview Note:     quit 2013 /smoked 50 yrs      S/P ablation of atrial fibrillation 11/11/2013     Priority: Low     Overview Note:     6/27/13      History of amiodarone therapy      Priority: Low    Mixed hyperlipidemia      Priority: Low     Overview Note:     Check lipids      Coronary

## 2023-10-03 ENCOUNTER — ANESTHESIA EVENT (OUTPATIENT)
Dept: CARDIAC CATH/INVASIVE PROCEDURES | Age: 73
End: 2023-10-03
Payer: MEDICARE

## 2023-10-03 ENCOUNTER — HOSPITAL ENCOUNTER (INPATIENT)
Dept: CARDIAC CATH/INVASIVE PROCEDURES | Age: 73
LOS: 2 days | Discharge: HOME | End: 2023-10-05
Attending: INTERNAL MEDICINE | Admitting: INTERNAL MEDICINE
Payer: MEDICARE

## 2023-10-03 ENCOUNTER — ANESTHESIA (OUTPATIENT)
Dept: CARDIAC CATH/INVASIVE PROCEDURES | Age: 73
End: 2023-10-03
Payer: MEDICARE

## 2023-10-03 PROBLEM — Z79.899 ENCOUNTER FOR MONITORING SOTALOL THERAPY: Status: ACTIVE | Noted: 2023-10-03

## 2023-10-03 PROBLEM — Z51.81 ENCOUNTER FOR MONITORING SOTALOL THERAPY: Status: ACTIVE | Noted: 2023-10-03

## 2023-10-03 LAB
ANION GAP SERPL CALCULATED.3IONS-SCNC: 12 MMOL/L (ref 7–19)
BUN SERPL-MCNC: 20 MG/DL (ref 8–23)
CALCIUM SERPL-MCNC: 10.4 MG/DL (ref 8.8–10.2)
CHLORIDE SERPL-SCNC: 106 MMOL/L (ref 98–111)
CO2 SERPL-SCNC: 26 MMOL/L (ref 22–29)
CREAT SERPL-MCNC: 1 MG/DL (ref 0.5–1.2)
EKG P AXIS: 39 DEGREES
EKG P AXIS: NORMAL DEGREES
EKG P-R INTERVAL: 252 MS
EKG P-R INTERVAL: NORMAL MS
EKG Q-T INTERVAL: 394 MS
EKG Q-T INTERVAL: 436 MS
EKG QRS DURATION: 108 MS
EKG QRS DURATION: 116 MS
EKG QTC CALCULATION (BAZETT): 420 MS
EKG QTC CALCULATION (BAZETT): 450 MS
EKG T AXIS: -113 DEGREES
EKG T AXIS: -121 DEGREES
ERYTHROCYTE [DISTWIDTH] IN BLOOD BY AUTOMATED COUNT: 12.7 % (ref 11.5–14.5)
GLUCOSE BLD-MCNC: 111 MG/DL (ref 70–99)
GLUCOSE BLD-MCNC: 94 MG/DL (ref 70–99)
GLUCOSE SERPL-MCNC: 140 MG/DL (ref 74–109)
HCT VFR BLD AUTO: 49 % (ref 42–52)
HGB BLD-MCNC: 15.8 G/DL (ref 14–18)
MCH RBC QN AUTO: 32.8 PG (ref 27–31)
MCHC RBC AUTO-ENTMCNC: 32.2 G/DL (ref 33–37)
MCV RBC AUTO: 101.7 FL (ref 80–94)
PERFORMED ON: ABNORMAL
PERFORMED ON: NORMAL
PLATELET # BLD AUTO: 298 K/UL (ref 130–400)
PMV BLD AUTO: 9.4 FL (ref 9.4–12.4)
POTASSIUM SERPL-SCNC: 4.5 MMOL/L (ref 3.5–5)
RBC # BLD AUTO: 4.82 M/UL (ref 4.7–6.1)
SODIUM SERPL-SCNC: 144 MMOL/L (ref 136–145)
WBC # BLD AUTO: 8.3 K/UL (ref 4.8–10.8)

## 2023-10-03 PROCEDURE — 5A2204Z RESTORATION OF CARDIAC RHYTHM, SINGLE: ICD-10-PCS | Performed by: INTERNAL MEDICINE

## 2023-10-03 PROCEDURE — 2580000003 HC RX 258: Performed by: INTERNAL MEDICINE

## 2023-10-03 PROCEDURE — 2140000000 HC CCU INTERMEDIATE R&B

## 2023-10-03 PROCEDURE — 6360000002 HC RX W HCPCS: Performed by: REGISTERED NURSE

## 2023-10-03 PROCEDURE — 2500000003 HC RX 250 WO HCPCS: Performed by: REGISTERED NURSE

## 2023-10-03 PROCEDURE — 80048 BASIC METABOLIC PNL TOTAL CA: CPT

## 2023-10-03 PROCEDURE — 92960 CARDIOVERSION ELECTRIC EXT: CPT

## 2023-10-03 PROCEDURE — 6370000000 HC RX 637 (ALT 250 FOR IP): Performed by: INTERNAL MEDICINE

## 2023-10-03 PROCEDURE — 93010 ELECTROCARDIOGRAM REPORT: CPT | Performed by: INTERNAL MEDICINE

## 2023-10-03 PROCEDURE — 3E033NZ INTRODUCTION OF ANALGESICS, HYPNOTICS, SEDATIVES INTO PERIPHERAL VEIN, PERCUTANEOUS APPROACH: ICD-10-PCS | Performed by: INTERNAL MEDICINE

## 2023-10-03 PROCEDURE — 82962 GLUCOSE BLOOD TEST: CPT

## 2023-10-03 PROCEDURE — 36415 COLL VENOUS BLD VENIPUNCTURE: CPT

## 2023-10-03 PROCEDURE — 3700000001 HC ADD 15 MINUTES (ANESTHESIA): Performed by: REGISTERED NURSE

## 2023-10-03 PROCEDURE — 2580000003 HC RX 258: Performed by: REGISTERED NURSE

## 2023-10-03 PROCEDURE — 92960 CARDIOVERSION ELECTRIC EXT: CPT | Performed by: INTERNAL MEDICINE

## 2023-10-03 PROCEDURE — 3700000000 HC ANESTHESIA ATTENDED CARE: Performed by: REGISTERED NURSE

## 2023-10-03 PROCEDURE — 85027 COMPLETE CBC AUTOMATED: CPT

## 2023-10-03 PROCEDURE — 93005 ELECTROCARDIOGRAM TRACING: CPT | Performed by: INTERNAL MEDICINE

## 2023-10-03 RX ORDER — ACETAMINOPHEN 650 MG/1
650 SUPPOSITORY RECTAL EVERY 6 HOURS PRN
Status: DISCONTINUED | OUTPATIENT
Start: 2023-10-03 | End: 2023-10-05 | Stop reason: HOSPADM

## 2023-10-03 RX ORDER — SODIUM CHLORIDE 0.9 % (FLUSH) 0.9 %
5-40 SYRINGE (ML) INJECTION EVERY 12 HOURS SCHEDULED
Status: DISCONTINUED | OUTPATIENT
Start: 2023-10-03 | End: 2023-10-05 | Stop reason: HOSPADM

## 2023-10-03 RX ORDER — LIDOCAINE HYDROCHLORIDE 10 MG/ML
INJECTION, SOLUTION EPIDURAL; INFILTRATION; INTRACAUDAL; PERINEURAL PRN
Status: DISCONTINUED | OUTPATIENT
Start: 2023-10-03 | End: 2023-10-03 | Stop reason: SDUPTHER

## 2023-10-03 RX ORDER — PANTOPRAZOLE SODIUM 20 MG/1
20 TABLET, DELAYED RELEASE ORAL DAILY
Status: DISCONTINUED | OUTPATIENT
Start: 2023-10-03 | End: 2023-10-05 | Stop reason: HOSPADM

## 2023-10-03 RX ORDER — RANOLAZINE 500 MG/1
1000 TABLET, EXTENDED RELEASE ORAL 2 TIMES DAILY
Status: DISCONTINUED | OUTPATIENT
Start: 2023-10-03 | End: 2023-10-05 | Stop reason: HOSPADM

## 2023-10-03 RX ORDER — ASPIRIN 81 MG/1
81 TABLET, CHEWABLE ORAL DAILY
Status: DISCONTINUED | OUTPATIENT
Start: 2023-10-03 | End: 2023-10-05 | Stop reason: HOSPADM

## 2023-10-03 RX ORDER — LISINOPRIL 2.5 MG/1
2.5 TABLET ORAL DAILY
Status: DISCONTINUED | OUTPATIENT
Start: 2023-10-03 | End: 2023-10-05 | Stop reason: HOSPADM

## 2023-10-03 RX ORDER — POLYETHYLENE GLYCOL 3350 17 G/17G
17 POWDER, FOR SOLUTION ORAL DAILY PRN
Status: DISCONTINUED | OUTPATIENT
Start: 2023-10-03 | End: 2023-10-05 | Stop reason: HOSPADM

## 2023-10-03 RX ORDER — PROPOFOL 10 MG/ML
INJECTION, EMULSION INTRAVENOUS PRN
Status: DISCONTINUED | OUTPATIENT
Start: 2023-10-03 | End: 2023-10-03 | Stop reason: SDUPTHER

## 2023-10-03 RX ORDER — PANTOPRAZOLE SODIUM 20 MG/1
20 TABLET, DELAYED RELEASE ORAL DAILY
Status: DISCONTINUED | OUTPATIENT
Start: 2023-10-03 | End: 2023-10-03

## 2023-10-03 RX ORDER — SODIUM CHLORIDE 0.9 % (FLUSH) 0.9 %
5-40 SYRINGE (ML) INJECTION PRN
Status: DISCONTINUED | OUTPATIENT
Start: 2023-10-03 | End: 2023-10-05 | Stop reason: HOSPADM

## 2023-10-03 RX ORDER — SODIUM CHLORIDE 9 MG/ML
INJECTION, SOLUTION INTRAVENOUS PRN
Status: DISCONTINUED | OUTPATIENT
Start: 2023-10-03 | End: 2023-10-05 | Stop reason: HOSPADM

## 2023-10-03 RX ORDER — ACETAMINOPHEN 325 MG/1
650 TABLET ORAL EVERY 6 HOURS PRN
Status: DISCONTINUED | OUTPATIENT
Start: 2023-10-03 | End: 2023-10-05 | Stop reason: HOSPADM

## 2023-10-03 RX ORDER — ISOSORBIDE MONONITRATE 60 MG/1
60 TABLET, EXTENDED RELEASE ORAL EVERY MORNING
Status: DISCONTINUED | OUTPATIENT
Start: 2023-10-03 | End: 2023-10-05 | Stop reason: HOSPADM

## 2023-10-03 RX ORDER — NITROGLYCERIN 0.4 MG/1
0.4 TABLET SUBLINGUAL EVERY 5 MIN PRN
Status: DISCONTINUED | OUTPATIENT
Start: 2023-10-03 | End: 2023-10-05 | Stop reason: HOSPADM

## 2023-10-03 RX ORDER — SODIUM CHLORIDE, SODIUM LACTATE, POTASSIUM CHLORIDE, CALCIUM CHLORIDE 600; 310; 30; 20 MG/100ML; MG/100ML; MG/100ML; MG/100ML
INJECTION, SOLUTION INTRAVENOUS CONTINUOUS PRN
Status: DISCONTINUED | OUTPATIENT
Start: 2023-10-03 | End: 2023-10-03 | Stop reason: SDUPTHER

## 2023-10-03 RX ORDER — SOTALOL HYDROCHLORIDE 80 MG/1
80 TABLET ORAL 2 TIMES DAILY
Status: DISCONTINUED | OUTPATIENT
Start: 2023-10-03 | End: 2023-10-05 | Stop reason: HOSPADM

## 2023-10-03 RX ORDER — FUROSEMIDE 80 MG
80 TABLET ORAL DAILY
Status: DISCONTINUED | OUTPATIENT
Start: 2023-10-03 | End: 2023-10-05 | Stop reason: HOSPADM

## 2023-10-03 RX ORDER — POTASSIUM CHLORIDE 750 MG/1
10 TABLET, EXTENDED RELEASE ORAL DAILY
Status: DISCONTINUED | OUTPATIENT
Start: 2023-10-03 | End: 2023-10-03

## 2023-10-03 RX ORDER — ETOMIDATE 2 MG/ML
INJECTION INTRAVENOUS PRN
Status: DISCONTINUED | OUTPATIENT
Start: 2023-10-03 | End: 2023-10-03 | Stop reason: SDUPTHER

## 2023-10-03 RX ORDER — POTASSIUM CHLORIDE 750 MG/1
10 TABLET, EXTENDED RELEASE ORAL
Status: DISCONTINUED | OUTPATIENT
Start: 2023-10-03 | End: 2023-10-05 | Stop reason: HOSPADM

## 2023-10-03 RX ORDER — ATORVASTATIN CALCIUM 80 MG/1
80 TABLET, FILM COATED ORAL EVERY MORNING
Status: DISCONTINUED | OUTPATIENT
Start: 2023-10-03 | End: 2023-10-05 | Stop reason: HOSPADM

## 2023-10-03 RX ADMIN — LISINOPRIL 2.5 MG: 2.5 TABLET ORAL at 16:21

## 2023-10-03 RX ADMIN — SODIUM CHLORIDE: 9 INJECTION, SOLUTION INTRAVENOUS at 08:13

## 2023-10-03 RX ADMIN — ASPIRIN 81 MG: 81 TABLET, CHEWABLE ORAL at 16:21

## 2023-10-03 RX ADMIN — ATORVASTATIN CALCIUM 80 MG: 80 TABLET, FILM COATED ORAL at 16:21

## 2023-10-03 RX ADMIN — SOTALOL HYDROCHLORIDE 80 MG: 80 TABLET ORAL at 20:28

## 2023-10-03 RX ADMIN — POTASSIUM CHLORIDE 10 MEQ: 750 TABLET, EXTENDED RELEASE ORAL at 20:30

## 2023-10-03 RX ADMIN — SODIUM CHLORIDE: 9 INJECTION, SOLUTION INTRAVENOUS at 07:12

## 2023-10-03 RX ADMIN — RANOLAZINE 1000 MG: 500 TABLET, EXTENDED RELEASE ORAL at 20:30

## 2023-10-03 RX ADMIN — SODIUM CHLORIDE, PRESERVATIVE FREE 10 ML: 5 INJECTION INTRAVENOUS at 14:40

## 2023-10-03 RX ADMIN — APIXABAN 5 MG: 5 TABLET, FILM COATED ORAL at 20:28

## 2023-10-03 RX ADMIN — SODIUM CHLORIDE, PRESERVATIVE FREE 10 ML: 5 INJECTION INTRAVENOUS at 20:31

## 2023-10-03 RX ADMIN — ISOSORBIDE MONONITRATE 90 MG: 60 TABLET, EXTENDED RELEASE ORAL at 20:27

## 2023-10-03 RX ADMIN — ETOMIDATE 10 MG: 2 INJECTION, SOLUTION INTRAVENOUS at 08:22

## 2023-10-03 RX ADMIN — PROPOFOL 50 MG: 10 INJECTION, EMULSION INTRAVENOUS at 08:22

## 2023-10-03 RX ADMIN — SODIUM CHLORIDE, SODIUM LACTATE, POTASSIUM CHLORIDE, AND CALCIUM CHLORIDE: 600; 310; 30; 20 INJECTION, SOLUTION INTRAVENOUS at 08:08

## 2023-10-03 RX ADMIN — SOTALOL HYDROCHLORIDE 80 MG: 80 TABLET ORAL at 08:59

## 2023-10-03 RX ADMIN — LIDOCAINE HYDROCHLORIDE 50 MG: 10 INJECTION, SOLUTION EPIDURAL; INFILTRATION; INTRACAUDAL; PERINEURAL at 08:22

## 2023-10-03 RX ADMIN — PANTOPRAZOLE SODIUM 20 MG: 20 TABLET, DELAYED RELEASE ORAL at 20:28

## 2023-10-03 RX ADMIN — METOPROLOL TARTRATE 25 MG: 25 TABLET, FILM COATED ORAL at 20:28

## 2023-10-03 ASSESSMENT — ENCOUNTER SYMPTOMS
DIARRHEA: 0
SHORTNESS OF BREATH: 0
ABDOMINAL DISTENTION: 0
BLOOD IN STOOL: 0
WHEEZING: 0
COUGH: 0
BACK PAIN: 0
ABDOMINAL PAIN: 0
VOMITING: 0

## 2023-10-03 NOTE — PROGRESS NOTES
Returned to room post cardioversion. Awake and alert. Atrial paced rhythm on monitor. Denies any c/o pain. Wife at bedside.

## 2023-10-03 NOTE — PROCEDURES
Date of Procedure:  10/3/2023     Indications:  Atrial fibrillation with an appropriate duration of anticoagulation     Conscious Sedation Protocol Used During this Procedure -anesthesia provided by anesthesia service        Anesthesia: Moderate   Sedation: 0 mg Midazolam (Versed)  0 mcg Fentanyl   Start time: 8:20 AM   Stop time: 8:30 AM   ASA Class: 3   EBL Not applicable     A trained medical personnel administered medications at my direction. The patient was monitored continuously with ECG, pulse oximetry, blood pressure monitoring and direct observation. After obtaining informed written consent and an appropriate level of conscious sedation, DCCV with 360 J was successful in restoring sinus rhythm. Complications:  none      Impression:  Successful DC cardioversion of atrial fibrillation to normal sinus rhythm on Eliquis used for anticoagulation. Initiate patient on Betapace 80 mg p.o. twice daily.     Electronically signed by Messi Dhaliwal MD on 10/3/23

## 2023-10-03 NOTE — PROGRESS NOTES
Transferred via wheelchair to [phone] with transporter and telemetry unit in place. Remains atrial paced on monitor. Denies any c/o pain.

## 2023-10-03 NOTE — PROCEDURES
Patient Name: Hortensia Corona    Medical Record Number: [id number]  Date: 10/3/2023     Performing Cardiologist: Dr Andreas iHcks  Procedure Start Time: 2251     Procedure End Time: 4305       Cardioversion Procedure Note  Indication: atrial fibrillation    Consent: The patient provided verbal consent for this procedure. The patient was brought to [facility] Room: 11. Time Out: All team members present. Correct patient, correct procedure, correct procedure site, correct position verified. Allergies reviewed. Pertinent diagnostic results reviewed. Pre-Medication: See Anesthesia Record    Procedure: The patient was placed in the supine position and the chest area was exposed. The cardioversion pads were applied in the standard manner and configuration. Attempt #1: The defibrillator was set on the synchronous mode and charged to 360 joules. A charge was then delivered which resulted in conversion to normal sinus rhythm. Attempt #2: Not necessary    Attempt #3: Not necessary    The patient tolerated the procedure well. Complications: None    Post procedure, patient taken to  [facility] Holding Room # 7  in stable condition and report given.     A post procedure 12 lead ECG was completed and reviewed yes    A post procedure Device Interrogation completed yes

## 2023-10-04 LAB
ANION GAP SERPL CALCULATED.3IONS-SCNC: 11 MMOL/L (ref 7–19)
BUN SERPL-MCNC: 19 MG/DL (ref 8–23)
CALCIUM SERPL-MCNC: 9.3 MG/DL (ref 8.8–10.2)
CHLORIDE SERPL-SCNC: 103 MMOL/L (ref 98–111)
CO2 SERPL-SCNC: 22 MMOL/L (ref 22–29)
CREAT SERPL-MCNC: 0.8 MG/DL (ref 0.5–1.2)
EKG P AXIS: NORMAL DEGREES
EKG P-R INTERVAL: NORMAL MS
EKG Q-T INTERVAL: 428 MS
EKG QRS DURATION: 110 MS
EKG QTC CALCULATION (BAZETT): 423 MS
EKG T AXIS: -95 DEGREES
ERYTHROCYTE [DISTWIDTH] IN BLOOD BY AUTOMATED COUNT: 12.6 % (ref 11.5–14.5)
GLUCOSE BLD-MCNC: 123 MG/DL (ref 70–99)
GLUCOSE SERPL-MCNC: 144 MG/DL (ref 74–109)
HCT VFR BLD AUTO: 42.6 % (ref 42–52)
HGB BLD-MCNC: 13.8 G/DL (ref 14–18)
MCH RBC QN AUTO: 32.9 PG (ref 27–31)
MCHC RBC AUTO-ENTMCNC: 32.4 G/DL (ref 33–37)
MCV RBC AUTO: 101.4 FL (ref 80–94)
PERFORMED ON: ABNORMAL
PLATELET # BLD AUTO: 243 K/UL (ref 130–400)
PMV BLD AUTO: 9.8 FL (ref 9.4–12.4)
POTASSIUM SERPL-SCNC: 3.9 MMOL/L (ref 3.5–5)
RBC # BLD AUTO: 4.2 M/UL (ref 4.7–6.1)
SODIUM SERPL-SCNC: 136 MMOL/L (ref 136–145)
WBC # BLD AUTO: 7.2 K/UL (ref 4.8–10.8)

## 2023-10-04 PROCEDURE — 80048 BASIC METABOLIC PNL TOTAL CA: CPT

## 2023-10-04 PROCEDURE — 99232 SBSQ HOSP IP/OBS MODERATE 35: CPT | Performed by: INTERNAL MEDICINE

## 2023-10-04 PROCEDURE — 2140000000 HC CCU INTERMEDIATE R&B

## 2023-10-04 PROCEDURE — 93005 ELECTROCARDIOGRAM TRACING: CPT | Performed by: INTERNAL MEDICINE

## 2023-10-04 PROCEDURE — 94760 N-INVAS EAR/PLS OXIMETRY 1: CPT

## 2023-10-04 PROCEDURE — 6370000000 HC RX 637 (ALT 250 FOR IP): Performed by: INTERNAL MEDICINE

## 2023-10-04 PROCEDURE — 2580000003 HC RX 258: Performed by: INTERNAL MEDICINE

## 2023-10-04 PROCEDURE — 93010 ELECTROCARDIOGRAM REPORT: CPT | Performed by: INTERNAL MEDICINE

## 2023-10-04 PROCEDURE — 85027 COMPLETE CBC AUTOMATED: CPT

## 2023-10-04 PROCEDURE — 82962 GLUCOSE BLOOD TEST: CPT

## 2023-10-04 PROCEDURE — 36415 COLL VENOUS BLD VENIPUNCTURE: CPT

## 2023-10-04 RX ADMIN — APIXABAN 5 MG: 5 TABLET, FILM COATED ORAL at 20:11

## 2023-10-04 RX ADMIN — SODIUM CHLORIDE, PRESERVATIVE FREE 10 ML: 5 INJECTION INTRAVENOUS at 20:14

## 2023-10-04 RX ADMIN — SOTALOL HYDROCHLORIDE 80 MG: 80 TABLET ORAL at 09:50

## 2023-10-04 RX ADMIN — LISINOPRIL 2.5 MG: 2.5 TABLET ORAL at 09:50

## 2023-10-04 RX ADMIN — ISOSORBIDE MONONITRATE 90 MG: 60 TABLET, EXTENDED RELEASE ORAL at 20:13

## 2023-10-04 RX ADMIN — PANTOPRAZOLE SODIUM 20 MG: 20 TABLET, DELAYED RELEASE ORAL at 20:12

## 2023-10-04 RX ADMIN — POTASSIUM CHLORIDE 10 MEQ: 750 TABLET, EXTENDED RELEASE ORAL at 20:13

## 2023-10-04 RX ADMIN — SODIUM CHLORIDE, PRESERVATIVE FREE 10 ML: 5 INJECTION INTRAVENOUS at 09:50

## 2023-10-04 RX ADMIN — ISOSORBIDE MONONITRATE 60 MG: 60 TABLET, EXTENDED RELEASE ORAL at 09:49

## 2023-10-04 RX ADMIN — ATORVASTATIN CALCIUM 80 MG: 80 TABLET, FILM COATED ORAL at 09:50

## 2023-10-04 RX ADMIN — EMPAGLIFLOZIN 25 MG: 25 TABLET, FILM COATED ORAL at 09:50

## 2023-10-04 RX ADMIN — ASPIRIN 81 MG: 81 TABLET, CHEWABLE ORAL at 09:50

## 2023-10-04 RX ADMIN — SOTALOL HYDROCHLORIDE 80 MG: 80 TABLET ORAL at 20:12

## 2023-10-04 RX ADMIN — APIXABAN 5 MG: 5 TABLET, FILM COATED ORAL at 09:50

## 2023-10-04 RX ADMIN — RANOLAZINE 1000 MG: 500 TABLET, EXTENDED RELEASE ORAL at 09:50

## 2023-10-04 RX ADMIN — FUROSEMIDE 80 MG: 80 TABLET ORAL at 09:50

## 2023-10-04 RX ADMIN — RANOLAZINE 1000 MG: 500 TABLET, EXTENDED RELEASE ORAL at 20:10

## 2023-10-04 NOTE — PROGRESS NOTES
Physician Progress Note      PATIENT:               Cholo Thurman  CSN #:                  [id number]  :                       1950  ADMIT DATE:       10/3/2023 6:26 AM  DISCH DATE:  RESPONDING  PROVIDER #:        Dang Kenney MD          QUERY TEXT:    Patient admitted with Atril fibrillation and maintained on Eliquis. Pt. had a   cardioversion proecdure. If possible, please document in progress notes and   discharge summary if you are evaluating and/or treating any of the following: The medical record reflects the following:  Risk Factors: AFIB, HTN, CAD, DM  Clinical Indicators: AGE: 68, with known history of AFIB, maintained on Eliqus  Treatment: Eliquis and Cardioversion    Alexis Malone RN-BSN, [facility]  Clinical   O. [phone]. [email or web address]  [employer] [employer]  Options provided:  -- Secondary hypercoagulable state in a patient with atrial fibrillation  -- Other - I will add my own diagnosis  -- Disagree - Not applicable / Not valid  -- Disagree - Clinically unable to determine / Unknown  -- Refer to Clinical Documentation Reviewer    PROVIDER RESPONSE TEXT:    Provider disagreed with this query. Anticoagulation for atrial fibrillation. Query created by: Monica Macdonald on 10/4/2023 4:31 PM      Electronically signed by:   Dang Kenney MD 10/4/2023 4:57 PM

## 2023-10-04 NOTE — PROGRESS NOTES
Cardiology Progress Note Vandana Bernal MD      Patient:  Lora Rossi  [id number]    Patient Active Problem List    Diagnosis Date Noted    Ischemic cardiomyopathy 02/23/2021     Priority: High    Dizziness 11/04/2020     Priority: Low    PSVT (paroxysmal supraventricular tachycardia) 09/24/2020     Priority: Low    PAC (premature atrial contraction) 09/24/2020     Priority: Low    Hx of CABG 08/13/2020     Priority: Low    Type 2 diabetes mellitus without complication, without long-term current use of insulin ([address]) 03/19/2020     Priority: Low     Overview Note:     Diet-controlled per patient. Recheck hemoglobin A1c and other labs. Obesity (BMI 30.0-34.9) 03/19/2020     Priority: Low     Overview Note:     Recommended at least 150 minutes of exercise per week and resistance training 2 days a week. Discussed healthy diet habits. Offered suggestions for calorie counting. Simple chronic bronchitis ([address]) 06/17/2019     Priority: Low     Overview Note:     Patient is followed by pulmonology Dr. Sudarshan [place], annual low-dose CT, patient states he does not have COPD and documentation supports this.       Essential hypertension 04/26/2018     Priority: Low     Overview Note:     Stable, continue lisinopril, Imdur, Lopressor      Chronic anticoagulation 04/26/2018     Priority: Low     Overview Note:     Eliquis      Amaurosis fugax 10/18/2017     Priority: Low    Chronic combined systolic and diastolic heart failure (HCC)      Priority: Low    Sick sinus syndrome [facility])      Priority: Low    Paroxysmal atrial fibrillation ([address])      Priority: Low    Chest pain 06/22/2016     Priority: Low    Ex-smoker      Priority: Low     Overview Note:     quit 2013 /smoked 50 yrs      S/P ablation of atrial fibrillation 11/11/2013     Priority: Low     Overview Note:     6/27/13      History of amiodarone therapy      Priority: Low    Mixed hyperlipidemia      Priority: Low     Overview Note:     Check lipids      Coronary artery

## 2023-10-04 NOTE — PLAN OF CARE
Problem: Chronic Conditions and Co-morbidities  Goal: Patient's chronic conditions and co-morbidity symptoms are monitored and maintained or improved  Outcome: Progressing     Problem: Discharge Planning  Goal: Discharge to home or other facility with appropriate resources  Outcome: Progressing     Problem: Safety - Adult  Goal: Free from fall injury  Outcome: Progressing     Problem: Cardiovascular - Adult  Goal: Maintains optimal cardiac output and hemodynamic stability  Outcome: Progressing  Goal: Absence of cardiac dysrhythmias or at baseline  Outcome: Progressing     Problem: Skin/Tissue Integrity - Adult  Goal: Skin integrity remains intact  Outcome: Progressing     Problem: ABCDS Injury Assessment  Goal: Absence of physical injury  Outcome: Progressing

## 2023-10-05 VITALS
OXYGEN SATURATION: 100 % | HEART RATE: 62 BPM | WEIGHT: 221 LBS | DIASTOLIC BLOOD PRESSURE: 67 MMHG | SYSTOLIC BLOOD PRESSURE: 101 MMHG | TEMPERATURE: 96.8 F | BODY MASS INDEX: 29.93 KG/M2 | HEIGHT: 72 IN | RESPIRATION RATE: 17 BRPM

## 2023-10-05 PROCEDURE — 99232 SBSQ HOSP IP/OBS MODERATE 35: CPT | Performed by: INTERNAL MEDICINE

## 2023-10-05 PROCEDURE — 93005 ELECTROCARDIOGRAM TRACING: CPT | Performed by: INTERNAL MEDICINE

## 2023-10-05 PROCEDURE — 6370000000 HC RX 637 (ALT 250 FOR IP): Performed by: INTERNAL MEDICINE

## 2023-10-05 PROCEDURE — 2580000003 HC RX 258: Performed by: INTERNAL MEDICINE

## 2023-10-05 RX ORDER — SOTALOL HYDROCHLORIDE 80 MG/1
80 TABLET ORAL 2 TIMES DAILY
Qty: 60 TABLET | Refills: 5 | Status: SHIPPED | OUTPATIENT
Start: 2023-10-05

## 2023-10-05 RX ADMIN — ASPIRIN 81 MG: 81 TABLET, CHEWABLE ORAL at 08:09

## 2023-10-05 RX ADMIN — ATORVASTATIN CALCIUM 80 MG: 80 TABLET, FILM COATED ORAL at 08:10

## 2023-10-05 RX ADMIN — APIXABAN 5 MG: 5 TABLET, FILM COATED ORAL at 08:10

## 2023-10-05 RX ADMIN — SOTALOL HYDROCHLORIDE 80 MG: 80 TABLET ORAL at 08:10

## 2023-10-05 RX ADMIN — EMPAGLIFLOZIN 25 MG: 25 TABLET, FILM COATED ORAL at 08:10

## 2023-10-05 RX ADMIN — SODIUM CHLORIDE, PRESERVATIVE FREE 10 ML: 5 INJECTION INTRAVENOUS at 08:14

## 2023-10-05 RX ADMIN — LISINOPRIL 2.5 MG: 2.5 TABLET ORAL at 08:10

## 2023-10-05 RX ADMIN — RANOLAZINE 1000 MG: 500 TABLET, EXTENDED RELEASE ORAL at 08:10

## 2023-10-05 RX ADMIN — FUROSEMIDE 80 MG: 80 TABLET ORAL at 08:14

## 2023-10-05 RX ADMIN — ISOSORBIDE MONONITRATE 60 MG: 60 TABLET, EXTENDED RELEASE ORAL at 08:10

## 2023-10-05 NOTE — PROGRESS NOTES
disease involving native coronary artery of native heart      Priority: Low     Overview Note:     06/27/2013 CABG X4 LIMA-diag, VG-LAD, VG-OM, VG-PDA [facility])  11/10/2015  SE negative for myocardial ischemia  6/24/2016  TTE  Normal LVFX  7/2/2018 lexiscan Positive for inferior MI + myocardial ischemia, EF 50%, 12% ischemic myocardium on stress, intermediate risk findings, AUC indication 17, AUC score 7  7/11/18  Cath  Severe NVD, patent LIMA-LAD, patent yet extremely small VG-LAD, patent VG-RCA, closed VG-OM, anterior lateral akinesis, EF 35%  10/11/19  lexiscan Positive for inferior lateral myocardial ischemia, EF 51%, 1% ischemic myocardium on stress, low risk findings, AUC indication 15, AUC score 4, Johnna Rader MD)   10/12/19           Encounter for monitoring sotalol therapy 10/03/2023       Admit Date:  10/3/2023    Admission Problem List: Present on Admission:   Encounter for monitoring sotalol therapy      Cardiac Specific Data:  Specialty Problems          Cardiology Problems    Ischemic cardiomyopathy        Coronary artery disease involving native coronary artery of native heart        Mixed hyperlipidemia        Chest pain        Paroxysmal atrial fibrillation (HCC)        Chronic combined systolic and diastolic heart failure (HCC)        Sick sinus syndrome (HCC)        Amaurosis fugax        Essential hypertension        PAC (premature atrial contraction)        PSVT (paroxysmal supraventricular tachycardia)         1. Coronary artery disease status post CABG 6/27/2013 with four-vessel grafting, LIMA to diagonal (patent), SVG to LAD (attenuated with small LAD) SVG to OM (occluded), SVG to RCA (patent), catheterization 10/12/2019 with occluded mid LAD, stenotic mid RCA, large inferior infarct, ejection fraction 35% ICD placement 2/2021.  2.  Paroxysmal atrial fibrillation status post prior ablations x2, on Eliquis, DCCV 10/3/2023, initiated on Betapace.   3.  Diabetes mellitus type 2.  4. 10/04/23  0125    136   K 4.5 3.9    103   CO2 26 22   BUN 20 19   CREATININE 1.0 0.8     LIVER PROFILE: No results for input(s): \"AST\", \"ALT\", \"LIPASE\", \"AMYLASE\", \"ALB\", \"BILIDIR\", \"BILITOT\", \"ALKPHOS\" in the last 72 hours. PT/INR: No results for input(s): \"PROTIME\", \"INR\" in the last 72 hours. APTT: No results for input(s): \"APTT\" in the last 72 hours. BNP:  No results for input(s): \"BNP\" in the last 72 hours. CK, CKMB, Troponin: @LABRCNT (CKTOTAL:3, CKMB:3, TROPONINI:3)@    IMAGING:  No results found. Assessment and Plan: This is a 68y.o. year old male with past medical history of coronary artery disease, prior CABG 6/2013 with four-vessel grafting, infarcted inferior wall with ejection fraction 35%, patent LIMA to diagonal, attenuated SVG to small LAD, occluded SVG to OM, patent SVG to RCA with infarcted inferior wall, diabetes mellitus type 2, hypertension, paroxysmal atrial fibrillation with prior ablations x2, on Eliquis, status post ICD placement 2/23/2021, presenting with recurrent atrial fibrillation with symptoms of lightheadedness, undergoing DCCV 10/3/2023 and initiated on Betapace. 1.  Received 5 doses of Betapace. QTc acceptable. Atrial paced rhythm which will be maintained with preference pacing on. Lopressor discontinued. Can discharge home and follow-up as an outpatient with cardiology in 2 to 4 weeks. Hailey Howard MD, MD 10/5/2023 11:15 AM    Thisdictation was generated by voice recognition computer software. Although all attempts are made to edit the dictation for accuracy, there may be errors in the transcription that are not intended.

## 2023-10-06 LAB
EKG P AXIS: 69 DEGREES
EKG P-R INTERVAL: 272 MS
EKG Q-T INTERVAL: 438 MS
EKG QRS DURATION: 100 MS
EKG QTC CALCULATION (BAZETT): 445 MS
EKG T AXIS: -76 DEGREES

## 2023-10-06 PROCEDURE — 93010 ELECTROCARDIOGRAM REPORT: CPT | Performed by: INTERNAL MEDICINE

## 2023-10-20 ENCOUNTER — HOSPITAL ENCOUNTER (OUTPATIENT)
Dept: NON INVASIVE DIAGNOSTICS | Age: 73
Discharge: HOME | End: 2023-10-20
Payer: MEDICARE

## 2023-10-20 ENCOUNTER — HOSPITAL ENCOUNTER (OUTPATIENT)
Dept: NUCLEAR MEDICINE | Age: 73
Discharge: HOME | End: 2023-10-22
Payer: MEDICARE

## 2023-10-20 DIAGNOSIS — I25.5 ISCHEMIC CARDIOMYOPATHY: ICD-10-CM

## 2023-10-20 DIAGNOSIS — I10 ESSENTIAL HYPERTENSION: ICD-10-CM

## 2023-10-20 DIAGNOSIS — R07.9 CHEST PAIN, UNSPECIFIED TYPE: ICD-10-CM

## 2023-10-20 PROCEDURE — 6360000004 HC RX CONTRAST MEDICATION: Performed by: NURSE PRACTITIONER

## 2023-10-20 PROCEDURE — 93017 CV STRESS TEST TRACING ONLY: CPT

## 2023-10-20 PROCEDURE — A9502 TC99M TETROFOSMIN: HCPCS | Performed by: NURSE PRACTITIONER

## 2023-10-20 PROCEDURE — 93016 CV STRESS TEST SUPVJ ONLY: CPT | Performed by: INTERNAL MEDICINE

## 2023-10-20 PROCEDURE — 6360000002 HC RX W HCPCS: Performed by: NURSE PRACTITIONER

## 2023-10-20 PROCEDURE — 93018 CV STRESS TEST I&R ONLY: CPT | Performed by: INTERNAL MEDICINE

## 2023-10-20 PROCEDURE — 3430000000 HC RX DIAGNOSTIC RADIOPHARMACEUTICAL: Performed by: NURSE PRACTITIONER

## 2023-10-20 PROCEDURE — 78452 HT MUSCLE IMAGE SPECT MULT: CPT | Performed by: INTERNAL MEDICINE

## 2023-10-20 PROCEDURE — C8929 TTE W OR WO FOL WCON,DOPPLER: HCPCS

## 2023-10-20 RX ORDER — REGADENOSON 0.08 MG/ML
0.4 INJECTION, SOLUTION INTRAVENOUS
Status: COMPLETED | OUTPATIENT
Start: 2023-10-20 | End: 2023-10-20

## 2023-10-20 RX ADMIN — PERFLUTREN 1.5 ML: 6.52 INJECTION, SUSPENSION INTRAVENOUS at 08:58

## 2023-10-20 RX ADMIN — TETROFOSMIN 24 MILLICURIE: 1.38 INJECTION, POWDER, LYOPHILIZED, FOR SOLUTION INTRAVENOUS at 11:59

## 2023-10-20 RX ADMIN — REGADENOSON 0.4 MG: 0.08 INJECTION, SOLUTION INTRAVENOUS at 10:51

## 2023-10-20 RX ADMIN — TETROFOSMIN 8 MILLICURIE: 1.38 INJECTION, POWDER, LYOPHILIZED, FOR SOLUTION INTRAVENOUS at 11:58

## 2023-10-27 ENCOUNTER — APPOINTMENT (OUTPATIENT)
Dept: CT IMAGING | Age: 73
End: 2023-10-27
Payer: MEDICARE

## 2023-10-27 ENCOUNTER — HOSPITAL ENCOUNTER (INPATIENT)
Age: 73
LOS: 1 days | Discharge: HOME | End: 2023-10-28
Attending: HOSPITALIST | Admitting: HOSPITALIST
Payer: MEDICARE

## 2023-10-27 ENCOUNTER — TELEPHONE (OUTPATIENT)
Dept: CARDIOLOGY CLINIC | Age: 73
End: 2023-10-27

## 2023-10-27 DIAGNOSIS — H53.461 HEMIANOPIA OF RIGHT EYE: Primary | ICD-10-CM

## 2023-10-27 PROBLEM — G93.9: Status: ACTIVE | Noted: 2023-10-27

## 2023-10-27 LAB
ALBUMIN SERPL-MCNC: 4.6 G/DL (ref 3.5–5.2)
ALP SERPL-CCNC: 70 U/L (ref 40–130)
ALT SERPL-CCNC: 11 U/L (ref 5–41)
ANION GAP SERPL CALCULATED.3IONS-SCNC: 11 MMOL/L (ref 7–19)
APTT PPP: 26.8 SEC (ref 26–36.2)
AST SERPL-CCNC: 16 U/L (ref 5–40)
BASOPHILS # BLD: 0.1 K/UL (ref 0–0.2)
BASOPHILS NFR BLD: 0.8 % (ref 0–1)
BILIRUB SERPL-MCNC: 0.9 MG/DL (ref 0.2–1.2)
BUN SERPL-MCNC: 20 MG/DL (ref 8–23)
CALCIUM SERPL-MCNC: 9.9 MG/DL (ref 8.8–10.2)
CHLORIDE SERPL-SCNC: 100 MMOL/L (ref 98–111)
CO2 SERPL-SCNC: 28 MMOL/L (ref 22–29)
CREAT SERPL-MCNC: 1 MG/DL (ref 0.5–1.2)
CRP SERPL HS-MCNC: <0.3 MG/DL (ref 0–0.5)
EOSINOPHIL # BLD: 0.1 K/UL (ref 0–0.6)
EOSINOPHIL NFR BLD: 1.8 % (ref 0–5)
ERYTHROCYTE [DISTWIDTH] IN BLOOD BY AUTOMATED COUNT: 12.7 % (ref 11.5–14.5)
ERYTHROCYTE [SEDIMENTATION RATE] IN BLOOD BY WESTERGREN METHOD: 4 MM/HR (ref 0–15)
GLUCOSE SERPL-MCNC: 92 MG/DL (ref 74–109)
HCT VFR BLD AUTO: 46.8 % (ref 42–52)
HGB BLD-MCNC: 15.6 G/DL (ref 14–18)
IMM GRANULOCYTES # BLD: 0 K/UL
INR PPP: 1.13 (ref 0.88–1.18)
LYMPHOCYTES # BLD: 2.5 K/UL (ref 1.1–4.5)
LYMPHOCYTES NFR BLD: 32 % (ref 20–40)
MCH RBC QN AUTO: 33.3 PG (ref 27–31)
MCHC RBC AUTO-ENTMCNC: 33.3 G/DL (ref 33–37)
MCV RBC AUTO: 99.8 FL (ref 80–94)
MONOCYTES # BLD: 0.9 K/UL (ref 0–0.9)
MONOCYTES NFR BLD: 11.1 % (ref 0–10)
NEUTROPHILS # BLD: 4.2 K/UL (ref 1.5–7.5)
NEUTS SEG NFR BLD: 53.8 % (ref 50–65)
PLATELET # BLD AUTO: 258 K/UL (ref 130–400)
PMV BLD AUTO: 9.4 FL (ref 9.4–12.4)
POTASSIUM SERPL-SCNC: 4.6 MMOL/L (ref 3.5–5)
PROT SERPL-MCNC: 7.2 G/DL (ref 6.6–8.7)
PROTHROMBIN TIME: 14.2 SEC (ref 12–14.6)
RBC # BLD AUTO: 4.69 M/UL (ref 4.7–6.1)
SODIUM SERPL-SCNC: 139 MMOL/L (ref 136–145)
WBC # BLD AUTO: 7.8 K/UL (ref 4.8–10.8)

## 2023-10-27 PROCEDURE — 1210000000 HC MED SURG R&B

## 2023-10-27 PROCEDURE — 85610 PROTHROMBIN TIME: CPT

## 2023-10-27 PROCEDURE — 70496 CT ANGIOGRAPHY HEAD: CPT

## 2023-10-27 PROCEDURE — 84100 ASSAY OF PHOSPHORUS: CPT

## 2023-10-27 PROCEDURE — 85730 THROMBOPLASTIN TIME PARTIAL: CPT

## 2023-10-27 PROCEDURE — 80053 COMPREHEN METABOLIC PANEL: CPT

## 2023-10-27 PROCEDURE — 2500000003 HC RX 250 WO HCPCS: Performed by: PHYSICIAN ASSISTANT

## 2023-10-27 PROCEDURE — 70450 CT HEAD/BRAIN W/O DYE: CPT

## 2023-10-27 PROCEDURE — 85025 COMPLETE CBC W/AUTO DIFF WBC: CPT

## 2023-10-27 PROCEDURE — 6360000004 HC RX CONTRAST MEDICATION: Performed by: PHYSICIAN ASSISTANT

## 2023-10-27 PROCEDURE — 84439 ASSAY OF FREE THYROXINE: CPT

## 2023-10-27 PROCEDURE — 36415 COLL VENOUS BLD VENIPUNCTURE: CPT

## 2023-10-27 PROCEDURE — 83036 HEMOGLOBIN GLYCOSYLATED A1C: CPT

## 2023-10-27 PROCEDURE — 84443 ASSAY THYROID STIM HORMONE: CPT

## 2023-10-27 PROCEDURE — 83735 ASSAY OF MAGNESIUM: CPT

## 2023-10-27 PROCEDURE — 80061 LIPID PANEL: CPT

## 2023-10-27 PROCEDURE — 99285 EMERGENCY DEPT VISIT HI MDM: CPT

## 2023-10-27 PROCEDURE — 86140 C-REACTIVE PROTEIN: CPT

## 2023-10-27 PROCEDURE — 85652 RBC SED RATE AUTOMATED: CPT

## 2023-10-27 RX ORDER — PROPARACAINE HYDROCHLORIDE 5 MG/ML
2 SOLUTION/ DROPS OPHTHALMIC ONCE
Status: COMPLETED | OUTPATIENT
Start: 2023-10-27 | End: 2023-10-27

## 2023-10-27 RX ADMIN — PROPARACAINE HYDROCHLORIDE 2 DROP: 5 SOLUTION/ DROPS OPHTHALMIC at 20:27

## 2023-10-27 RX ADMIN — IOPAMIDOL 90 ML: 755 INJECTION, SOLUTION INTRAVENOUS at 20:35

## 2023-10-27 ASSESSMENT — ENCOUNTER SYMPTOMS
BACK PAIN: 0
COLOR CHANGE: 0
APNEA: 0
EYE DISCHARGE: 0
PHOTOPHOBIA: 0
EYE ITCHING: 0
COUGH: 0
SHORTNESS OF BREATH: 0

## 2023-10-27 ASSESSMENT — VISUAL ACUITY
OS: 20/30
OU: 20/20
OD: 20/30

## 2023-10-27 NOTE — TELEPHONE ENCOUNTER
Pt called stating he can't see anything out of his right eye from half way up to ceiling. Pt advised to call his eye MD and get checked out. Pt verbally understood and states he will.

## 2023-10-27 NOTE — ED PROVIDER NOTES
artery disease)     Diabetes mellitus ([address] [facility])     diet controlled    Ex-smoker     quit 2013 / smoked 50 yrs @ 1.75 PPD for 87.5 pack - years    GERD (gastroesophageal reflux disease)     History of amiodarone therapy     Hyperlipidemia     [facility]. Hypertension     Hypokalemia     Hypotension     MI (myocardial infarction) ([address] [facility]) 06/2013    MI (myocardial infarction) ([facility]) 07/2013    S/P CABG x 4 11/11/2013 6/27/13    Stroke ([address] [place] [address]) 08/22/2017    eye         SURGICAL HISTORY       Past Surgical History:   Procedure Laterality Date    ABLATION OF DYSRHYTHMIC FOCUS N/A 2015    ABLATION OF DYSRHYTHMIC FOCUS  2016    CARDIAC CATHETERIZATION  6/27/13  [facility]    EF 45%    CATARACT REMOVAL WITH IMPLANT Bilateral     COLONOSCOPY N/A 06/16/2020    Dr Francine Borden x 1, AP x 1, 5 yr recall    CORONARY ARTERY BYPASS GRAFT  06/27/2013    Emergency CABG x 4, LIMA-DIAG, SVG-LAD, SVG-OM, SVG-PDA, RT [facility], DR Kritsen Steiner    CYST INCISION AND DRAINAGE N/A     RECTAL    RETROPHYARYNGEAL ABCESS INCISION/DRAIN      Abcess near rectum         CURRENT MEDICATIONS       Current Discharge Medication List        CONTINUE these medications which have NOT CHANGED    Details   sotalol (BETAPACE) 80 MG tablet Take 1 tablet by mouth 2 times daily  Qty: 60 tablet, Refills: 5      JARDIANCE 25 MG tablet Take 1 tablet by mouth daily      pantoprazole (PROTONIX) 20 MG tablet Take 2 tablets by mouth daily      isosorbide mononitrate (IMDUR) 60 MG extended release tablet Take (1) tab AM and 1-1/2 tab PM  Qty: 225 tablet, Refills: 3      lisinopril (PRINIVIL;ZESTRIL) 2.5 MG tablet Take 1 tablet by mouth daily  Qty: 30 tablet, Refills: 3      ranolazine (RANEXA) 1000 MG extended release tablet Take 1 tablet by mouth 2 times daily  Qty: 16 tablet, Refills: 0      nitroGLYCERIN (NITROSTAT) 0.4 MG SL tablet up to max of 3 total doses. If no relief after 1 dose, call 911.   Qty: 25 tablet, Refills: 5      potassium chloride (KLOR-CON) 20 MEQ MEDICATIONS:  Current Discharge Medication List          (Please note that portions of this note were completed with a voice recognition program.  Efforts were made to edit the dictations but occasionallywords are mis-transcribed.)    Emily Barr, [address]  10/28/23 3494

## 2023-10-28 VITALS
DIASTOLIC BLOOD PRESSURE: 68 MMHG | SYSTOLIC BLOOD PRESSURE: 110 MMHG | OXYGEN SATURATION: 100 % | RESPIRATION RATE: 16 BRPM | HEART RATE: 52 BPM | BODY MASS INDEX: 30.07 KG/M2 | TEMPERATURE: 97.3 F | WEIGHT: 222 LBS | HEIGHT: 72 IN

## 2023-10-28 LAB
ANION GAP SERPL CALCULATED.3IONS-SCNC: 14 MMOL/L (ref 7–19)
BUN SERPL-MCNC: 19 MG/DL (ref 8–23)
CALCIUM SERPL-MCNC: 9.6 MG/DL (ref 8.8–10.2)
CHLORIDE SERPL-SCNC: 101 MMOL/L (ref 98–111)
CHOLEST SERPL-MCNC: 159 MG/DL (ref 160–199)
CO2 SERPL-SCNC: 24 MMOL/L (ref 22–29)
CREAT SERPL-MCNC: 1 MG/DL (ref 0.5–1.2)
GLUCOSE SERPL-MCNC: 175 MG/DL (ref 74–109)
HBA1C MFR BLD: 6.4 % (ref 4–6)
HDLC SERPL-MCNC: 55 MG/DL (ref 55–121)
LDLC SERPL CALC-MCNC: 86 MG/DL
MAGNESIUM SERPL-MCNC: 2.4 MG/DL (ref 1.6–2.4)
PHOSPHATE SERPL-MCNC: 3.7 MG/DL (ref 2.5–4.5)
POTASSIUM SERPL-SCNC: 3.7 MMOL/L (ref 3.5–5)
SODIUM SERPL-SCNC: 139 MMOL/L (ref 136–145)
T4 FREE SERPL-MCNC: 1.27 NG/DL (ref 0.93–1.7)
TRIGL SERPL-MCNC: 90 MG/DL (ref 0–149)
TSH SERPL DL<=0.005 MIU/L-ACNC: 4.49 UIU/ML (ref 0.35–5.5)

## 2023-10-28 PROCEDURE — 2580000003 HC RX 258: Performed by: HOSPITALIST

## 2023-10-28 PROCEDURE — 36415 COLL VENOUS BLD VENIPUNCTURE: CPT

## 2023-10-28 PROCEDURE — 80048 BASIC METABOLIC PNL TOTAL CA: CPT

## 2023-10-28 PROCEDURE — 97161 PT EVAL LOW COMPLEX 20 MIN: CPT

## 2023-10-28 PROCEDURE — 94760 N-INVAS EAR/PLS OXIMETRY 1: CPT

## 2023-10-28 PROCEDURE — 92610 EVALUATE SWALLOWING FUNCTION: CPT

## 2023-10-28 PROCEDURE — 6370000000 HC RX 637 (ALT 250 FOR IP): Performed by: HOSPITALIST

## 2023-10-28 PROCEDURE — 99223 1ST HOSP IP/OBS HIGH 75: CPT | Performed by: PSYCHIATRY & NEUROLOGY

## 2023-10-28 PROCEDURE — 92522 EVALUATE SPEECH PRODUCTION: CPT

## 2023-10-28 RX ORDER — RANOLAZINE 500 MG/1
1000 TABLET, EXTENDED RELEASE ORAL 2 TIMES DAILY
Status: DISCONTINUED | OUTPATIENT
Start: 2023-10-28 | End: 2023-10-28 | Stop reason: HOSPADM

## 2023-10-28 RX ORDER — SODIUM CHLORIDE 0.9 % (FLUSH) 0.9 %
5-40 SYRINGE (ML) INJECTION EVERY 12 HOURS SCHEDULED
Status: DISCONTINUED | OUTPATIENT
Start: 2023-10-28 | End: 2023-10-28 | Stop reason: HOSPADM

## 2023-10-28 RX ORDER — SODIUM CHLORIDE 0.9 % (FLUSH) 0.9 %
5-40 SYRINGE (ML) INJECTION PRN
Status: DISCONTINUED | OUTPATIENT
Start: 2023-10-28 | End: 2023-10-28 | Stop reason: HOSPADM

## 2023-10-28 RX ORDER — LABETALOL HYDROCHLORIDE 5 MG/ML
10 INJECTION, SOLUTION INTRAVENOUS EVERY 10 MIN PRN
Status: DISCONTINUED | OUTPATIENT
Start: 2023-10-28 | End: 2023-10-28 | Stop reason: HOSPADM

## 2023-10-28 RX ORDER — VITAMIN B COMPLEX
2000 TABLET ORAL DAILY
Status: DISCONTINUED | OUTPATIENT
Start: 2023-10-28 | End: 2023-10-28 | Stop reason: HOSPADM

## 2023-10-28 RX ORDER — PANTOPRAZOLE SODIUM 20 MG/1
20 TABLET, DELAYED RELEASE ORAL
Status: DISCONTINUED | OUTPATIENT
Start: 2023-10-28 | End: 2023-10-28 | Stop reason: HOSPADM

## 2023-10-28 RX ORDER — ATORVASTATIN CALCIUM 80 MG/1
80 TABLET, FILM COATED ORAL NIGHTLY
Status: DISCONTINUED | OUTPATIENT
Start: 2023-10-28 | End: 2023-10-28 | Stop reason: HOSPADM

## 2023-10-28 RX ORDER — ASPIRIN 81 MG/1
81 TABLET ORAL DAILY
Status: DISCONTINUED | OUTPATIENT
Start: 2023-10-28 | End: 2023-10-28 | Stop reason: HOSPADM

## 2023-10-28 RX ORDER — PROMETHAZINE HYDROCHLORIDE 25 MG/ML
12.5 INJECTION, SOLUTION INTRAMUSCULAR; INTRAVENOUS EVERY 6 HOURS PRN
Status: DISCONTINUED | OUTPATIENT
Start: 2023-10-28 | End: 2023-10-28 | Stop reason: HOSPADM

## 2023-10-28 RX ORDER — ASPIRIN 300 MG/1
300 SUPPOSITORY RECTAL DAILY
Status: DISCONTINUED | OUTPATIENT
Start: 2023-10-28 | End: 2023-10-28 | Stop reason: HOSPADM

## 2023-10-28 RX ORDER — SODIUM CHLORIDE 9 MG/ML
INJECTION, SOLUTION INTRAVENOUS PRN
Status: DISCONTINUED | OUTPATIENT
Start: 2023-10-28 | End: 2023-10-28 | Stop reason: HOSPADM

## 2023-10-28 RX ORDER — SOTALOL HYDROCHLORIDE 80 MG/1
80 TABLET ORAL 2 TIMES DAILY
Status: DISCONTINUED | OUTPATIENT
Start: 2023-10-28 | End: 2023-10-28 | Stop reason: HOSPADM

## 2023-10-28 RX ORDER — NITROGLYCERIN 0.4 MG/1
0.4 TABLET SUBLINGUAL EVERY 5 MIN PRN
Status: DISCONTINUED | OUTPATIENT
Start: 2023-10-28 | End: 2023-10-28 | Stop reason: HOSPADM

## 2023-10-28 RX ADMIN — Medication 2000 UNITS: at 01:47

## 2023-10-28 RX ADMIN — RANOLAZINE 1000 MG: 500 TABLET, EXTENDED RELEASE ORAL at 01:47

## 2023-10-28 RX ADMIN — SOTALOL HYDROCHLORIDE 80 MG: 80 TABLET ORAL at 08:27

## 2023-10-28 RX ADMIN — RANOLAZINE 1000 MG: 500 TABLET, EXTENDED RELEASE ORAL at 08:26

## 2023-10-28 RX ADMIN — SOTALOL HYDROCHLORIDE 80 MG: 80 TABLET ORAL at 01:46

## 2023-10-28 RX ADMIN — ATORVASTATIN CALCIUM 80 MG: 80 TABLET, FILM COATED ORAL at 01:46

## 2023-10-28 RX ADMIN — EMPAGLIFLOZIN 25 MG: 25 TABLET, FILM COATED ORAL at 08:26

## 2023-10-28 RX ADMIN — PANTOPRAZOLE SODIUM 20 MG: 20 TABLET, DELAYED RELEASE ORAL at 06:12

## 2023-10-28 RX ADMIN — SODIUM CHLORIDE, PRESERVATIVE FREE 10 ML: 5 INJECTION INTRAVENOUS at 10:49

## 2023-10-28 RX ADMIN — APIXABAN 5 MG: 5 TABLET, FILM COATED ORAL at 01:46

## 2023-10-28 RX ADMIN — ASPIRIN 81 MG: 81 TABLET, COATED ORAL at 08:27

## 2023-10-28 RX ADMIN — APIXABAN 5 MG: 5 TABLET, FILM COATED ORAL at 08:26

## 2023-10-28 ASSESSMENT — ENCOUNTER SYMPTOMS
ABDOMINAL PAIN: 0
SHORTNESS OF BREATH: 0

## 2023-10-28 NOTE — DISCHARGE SUMMARY
HEAD WO CONTRAST    Result Date: 10/27/2023  EXAM:  CT BRAIN  HISTORY:  Abnormal visual changes. TECHNIQUE:  CT brain without intravenous contrast.  5-mm axial sections with Reformations. COMPARISON:  None  FINDINGS: There is generalized cerebral volume loss. There is mild periventricular and deep white matter low attenuation which although nonspecific is suggestive of chronic microvascular ischemic change. Atherosclerotic disease. The brain otherwise was unremarkable  without evidence of hemorrhage or large vessel distribution recent ischemic infarction. There is no suggestion of acute hydrocephalus or subdural fluid collection. No mass or mass effect. Cranium has no acute finding. Mastoid processes are aerated. The visualized paranasal sinuses  are clear. The intraorbital structures and optic globes appear grossly normal by CT. 1.  No abnormal intracranial process identified to explain the patient's symptoms. Consider correlation with MRI if indicated. - - - - -  All CT scans are performed using dose optimization techniques as appropriate to the performed exam and include at least one of the following: Automated exposure control, adjustment of the mA and/or kV according to size, and the use of iterative reconstruction technique. ______________________________________ Electronically signed by: Favio Lopez M.D. Date:     10/27/2023 Time:    19:28       Pertinent Labs:   CBC:   Recent Labs     10/27/23  1846   WBC 7.8   HGB 15.6        BMP:    Recent Labs     10/27/23  1846 10/28/23  0213    139   K 4.6 3.7    101   CO2 28 24   BUN 20 19   CREATININE 1.0 1.0   GLUCOSE 92 175*     INR:   Recent Labs     10/27/23  1846   INR 1.13     Lipids:   Recent Labs     10/27/23  1846   CHOL 159*   HDL 55     ABGs:No results for input(s): \"PHART\", \"MZG7GKN\", \"PO2ART\", \"AFM6LQM\", \"BEART\", \"HGBAE\", \"F1MNJENC\", \"CARBOXHGBART\", \"02THERAPY\" in the last 72 hours.   HgBA1c:    Recent Labs 10/27/23  1846   LABA1C 6.4*       Discharge Medications:         Medication List        START taking these medications      aspirin 81 MG tablet            CHANGE how you take these medications      atorvastatin 80 MG tablet  Commonly known as: Lipitor  Take 1 tablet by mouth nightly  What changed: when to take this            CONTINUE taking these medications      Eliquis 5 MG Tabs tablet  Generic drug: apixaban     furosemide 80 MG tablet  Commonly known as: LASIX  Take 1 tablet by mouth daily     isosorbide mononitrate 60 MG extended release tablet  Commonly known as: IMDUR  Take (1) tab AM and 1-1/2 tab PM     Jardiance 25 MG tablet  Generic drug: empagliflozin     lisinopril 2.5 MG tablet  Commonly known as: PRINIVIL;ZESTRIL  Take 1 tablet by mouth daily     nitroGLYCERIN 0.4 MG SL tablet  Commonly known as: NITROSTAT  up to max of 3 total doses. If no relief after 1 dose, call 911. pantoprazole 20 MG tablet  Commonly known as: PROTONIX     potassium chloride 20 MEQ packet  Commonly known as: KLOR-CON     ranolazine 1000 MG extended release tablet  Commonly known as: RANEXA  Take 1 tablet by mouth 2 times daily     sotalol 80 MG tablet  Commonly known as: BETAPACE  Take 1 tablet by mouth 2 times daily     vitamin D 50 MCG (2000 UT) Caps capsule              Discharge Instructions: Follow up with Maria Del Carmen Flores MD in 7 days. Take medications as directed. Resume activity as tolerated. Diet: ADULT DIET; Regular; 4 carb choices (60 gm/meal)     Disposition: Patient is medically stable and will be discharged home. Time spent on discharge 30 minutes.     Signed:  Tone Sullivan MD  10/28/2023 1:47 PM

## 2023-10-28 NOTE — PROGRESS NOTES
Physical Therapy  [facility]ALU[facility]      Francesca Yeboah    : 1950  MRN: [id number]   PHYSICIAN:  Mike Hernandez MD  Primary Problem    Patient Active Problem List   Diagnosis    Coronary artery disease involving native coronary artery of native heart    Mixed hyperlipidemia    S/P ablation of atrial fibrillation    History of amiodarone therapy    Ex-smoker    Chest pain    Paroxysmal atrial fibrillation (HCC)    Chronic combined systolic and diastolic heart failure (HCC)    Sick sinus syndrome (HCC)    Amaurosis fugax    Essential hypertension    Chronic anticoagulation    Simple chronic bronchitis (HCC)    Type 2 diabetes mellitus without complication, without long-term current use of insulin (HCC)    Obesity (BMI 30.0-34. 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 States he has made several laps in the gerard and doesn't need PT. Pain: No complaint of pain    OBJECTIVE:    Orientation is Within normal limits           ACTIVE ROM:       All major lower extremity joints are within functional limits      STRENGTH     Both lower extremities are 5/5.     TRANSFERS   Sit to stand   Independent      Bed to chair   Independent     Bed to mobility   Supine to sit   Independent       Roll     Independent     Scoot Side to side / Up and down   Independent    AMBULATION   Distance: 300'+     Device: none     Assistance: Independent       Comment: pt wearing tennis shoes when amb in hallway    BALANCE   Sitting    Good     Standing    Good    ASSESSMENT      Independent and safe with all

## 2023-10-28 NOTE — H&P
[facility] History and Physical    Patient Information:  Patient: Lee Castillo  MRN: [id number]   Acct: [de-identified]  YOB: 1950  Admit Date: 10/27/2023      Primary Care Physician: Jewel Meyer MD  Advance Directive: Full Code  Health Care Proxy: his wife, Mrs. Triny Gleason, [phone]         SUBJECTIVE:    Chief Complaint   Patient presents with    Loss of Vision     Right eye; started last night about 2100      EP Sign Out:  Had loss of vision last night at 9pm  Had seen optho today  Opth stated no need for optho intervention  Dr Mariza Colmenares was consulted   -  R sided upper outer only hemianopsia  Had vision loss 26 hours ago+, at 9pm last night  Dr Mariza Colmenares wants work up    HPI:  Mr. Lee Castillo is a pleasant 68year old gent from home. He has had a decrease in his right visual field for ~26.5 hours at the time he was referred to me. The deficit has been constant and unchanging. He has a history of a CVA that does not have residual deficit. He has a history of CAD. He has seen his Opthamologist who evaluated him and stated that it was neurological, they sent him in to [facility] to see Neurology. Review of Systems:   Review of Systems   Constitutional:  Negative for chills, diaphoresis, fatigue and fever. Eyes:  Positive for visual disturbance. Respiratory:  Negative for shortness of breath. Cardiovascular:  Positive for chest pain (chroic but unchanged from baseline). Gastrointestinal:  Negative for abdominal pain. Neurological:  Negative for weakness, numbness and headaches. Psychiatric/Behavioral:  Negative for confusion.       Past Medical History:   Diagnosis Date    Alcohol abuse, in remission     Alcoholic in Remission since 1985    Arthritis     Atrial fibrillation ([address])     Atrial flutter ([address])     CAD (coronary artery disease)     Diabetes mellitus ([address])     diet controlled    Ex-smoker     quit 2013 / smoked 50 yrs @ 1.75 PPD for 87.5 pack -

## 2023-10-28 NOTE — CONSULTS
[facility] Neurology Consult      Patient:   Wood Singh  MR#:    [id number]  Account Number:                   [id number]      Room:    24/524-01   YOB: 1950  Date of Progress Note: 10/28/2023  Time of Note                           11:44 AM  Attending Physician:  Tomás Garner MD  Consulting Physician:  Giselle Parker DO       CHIEF COMPLAINT:  Monocular visual loss     HISTORY OF PRESENT ILLNESS:   This is a 68 y.o. male who was admitted with monocular visual loss. The patient states that over the past few days he is noted an altitudinal visual field deficit in the right eye with the upper visual fields being impaired. This came on suddenly. He denies left eye symptoms. He denies eye pain or conjunctival injection. He was seen by ophthalmology and told that he had a \"stroke in his eye\". He denies facial drooping, slurred speech, focal weakness or numbness. He has no prior stroke history. He does have an underlying history of atrial fibrillation on Eliquis and diabetes. CT head, CTA of the head and neck were largely unrevealing. Sed rate and CRP were normal.    REVIEW OF SYSTEMS:  Constitutional - No fever or chills. HENT -  No Scalp tenderness. No tinnitus or significant hearing loss. No nose bleeding, no sore throat. Eyes - No eye pain  Respiratory - No significant shortness of breath or cough  Cardiovascular - No chest pain. No palpitations or significant leg swelling  Gastrointestinal - No abdominal swelling or pain. Genitourinary - No difficulty urinating, dysuria  Musculoskeletal - No back pain or myalgia. Skin - No color change or rash  Neurologic - No seizures. No lateralizing weakness. No numbness. Hematologic - No easy bruising or spontaneous bleeding. Psychiatric - No anxiety.      PAST MEDICAL HISTORY:      Diagnosis Date    Alcohol abuse, in remission     Alcoholic in Remission since 1985    Arthritis     Atrial fibrillation ([address])     Atrial flutter ([address]) the patient's symptoms. Consider correlation with MRI if indicated. - - - - -  All CT scans are performed using dose optimization techniques as appropriate to the performed exam and include at least one of the following: Automated exposure control, adjustment of the mA and/or kV according to size, and the use of iterative reconstruction technique. ______________________________________ Electronically signed by: Sima Aparicio M.D. Date:     10/27/2023 Time:    19:28       Recent Labs     10/27/23  1846   WBC 7.8   HGB 15.6        Recent Labs     10/27/23  1846 10/28/23  0213    139   K 4.6 3.7    101   CO2 28 24   BUN 20 19   CREATININE 1.0 1.0   GLUCOSE 92 175*     Recent Labs     10/27/23  1846   AST 16   ALT 11   BILITOT 0.9   ALKPHOS 70     Recent Labs     10/27/23  1846   CHOL 159*   HDL 55     Recent Labs     10/27/23  1846   INR 1.13         ASSESSMENT:  68 y.o. admitted with right monocular visual loss with an altitudinal visual field defect. Suspect a possible partial central retinal artery occlusion versus anterior ischemic optic neuropathy. CT head,  CTA head and neck largely negative. ESR/CRP normal.  Recent ECHO with EF 30%, global hypokinesis noted, no mass / thrombus. PLAN:   MRI Brain    Tele   Continue Eliquis, ASA, statin, blood glucose maximization. Follow up with ophthalmology as outpatient. Kiana Calles for discharge pending MRI      Please feel free to call with any questions. [phone] (cell phone).     Justino Brown DO  Board Certified Neurology

## 2023-10-28 NOTE — ED NOTES
Laila Velarde (neurology) paged for [facility].       Dion Salas  10/27/23 4345

## 2023-10-28 NOTE — CARE COORDINATION
Case Management Assessment  Initial Evaluation    Date/Time of Evaluation: 10/28/2023 2:35 PM  Assessment Completed by: Corie Barnett    If patient is discharged prior to next notation, then this note serves as note for discharge by case management. 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 Yes    Chart Reviewed: Yes      History Provided by: Patient  Patient Orientation: Alert and Oriented    Patient Cognition: Alert    Hospitalization in the last 30 days (Readmission):  Yes    If yes, Readmission Assessment in  Navigator will be completed. Advance Directives:      Code Status: Full Code   Patient's Primary Decision Maker is: Legal Next of Kin      Discharge Planning:    Patient lives with: Spouse/Significant Other, Family Members Type of Home: House  Primary Care Giver: Self  Patient Support Systems include: Spouse/Significant Other, Family Members   Current Financial resources: Medicare,  ([place])  Current community resources: None  Current services prior to admission: None            Current DME:              Type of Home Care services:  None    ADLS  Prior functional level: Independent in ADLs/IADLs  Current functional level: Independent in ADLs/IADLs    PT AM-PAC:   /24  OT AM-PAC:   /24    Family can provide assistance at DC: Yes  Would you like Case Management to discuss the discharge plan with any other family members/significant others, and if so, who?  Yes  Plans to Return to Present Housing: Yes  Potential Assistance needed at discharge: N/A            Potential DME:    Patient expects to discharge to: [address] [place] [address] for transportation at discharge: Family    Financial    Payor: Lorenzo Stock[place] / Plan: [facility] [place] LOCAL / Product Type: *No Product type* /     Does insurance require precert for SNF: Yes    Potential assistance Purchasing Medications: No  Meds-to-Beds request:        [id number] [address] [address] [facility] [phone]  [address] [place] [address]  Phone: [phone] Fax: [phone] [address], [address] [phone] - [phone]  [facility] [address] [address]  Phone: [phone] Fax: [phone]      Notes:    Factors facilitating achievement of predicted outcomes: Family support, Motivated, Cooperative, Pleasant, and Sense of humor    Barriers to discharge: Medical complications    Additional Case Management Notes: Sw met with pt at bedside. Pt had no needs at this time.     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

## 2023-10-28 NOTE — PROGRESS NOTES
Facility/Department: [address] SERVICES   CLINICAL BEDSIDE SWALLOW EVALUATION  INITIAL EVALUATION OF SPEECH PRODUCTION    NAME: Lee Castillo  : 1950  MRN: [id number]    ADMISSION DATE: 10/27/2023  ADMITTING DIAGNOSIS: has Coronary artery disease involving native coronary artery of native heart; Mixed hyperlipidemia; S/P ablation of atrial fibrillation; History of amiodarone therapy; Ex-smoker; Chest pain; Paroxysmal atrial fibrillation ([address]); Chronic combined systolic and diastolic heart failure ([address]); Sick sinus syndrome ([address]); Amaurosis fugax; Essential hypertension; Chronic anticoagulation; Simple chronic bronchitis ([address]); Type 2 diabetes mellitus without complication, without long-term current use of insulin ([address]); Obesity (BMI 30.0-34.9); Hx of CABG; PSVT (paroxysmal supraventricular tachycardia); PAC (premature atrial contraction); Dizziness; Ischemic cardiomyopathy; Encounter for monitoring sotalol therapy; and Right parietal lobe lesion on their problem list.    Date of Eval: 10/28/2023  Evaluating Therapist: AMBIKA Narvaez    Clinical Impression  Speech assessment completed on this date. Mild dysarthria is present as characterized by decreased lingual and labial ROM and strength. Clinical Bedside Swallow Evaluation completed on this date. Oral prep reveals decreased vertical jaw movement observed with ice chips and regular solids. Adequate labial seal observed. Dentures not in place for trials. Oral transit timing ranges from 1-2 seconds with ice chips, puree consistencies, and thin liquids via consecutive sips side of cup. Oral transit timing ranges from 2-3 seconds with regular solids. Minimum residue noted with regular solids and cleared with liquid wash. Liquid wash was effective in clearing minimal oral residue. Laryngeal movement observed to be consistently sluggish and mildly decreased for swallow airway protection.  Even so, no overt s/s of aspiration/penetration observed with ice aspiration/penetration during hospitalization. Goal 2: Patient will demonstrate awareness of general aspiration precautions. Goal 3: Patient will participate in swallowing reassessments to ensure safest diet consistencies. Goal 4: Patient will independently complete daily oral hygiene protocol. Goal 5: Patient will complete breath support, oral motor, lingual, and pharyngeal exercises with provision of mod cues/prompts. General  Chart Reviewed: Yes  Behavior/Cognition: Alert; Cooperative  Temperature Spikes Noted: No  Respiratory Status: Room air  O2 Device: None (Room air)  Communication Observation: Dysarthria  Follows Directions: Simple  Dentition: Dentures top;Dentures bottom  Patient Positioning: Upright in bed  Volitional Swallow: Delayed  Prior Dysphagia History: Denies hx dysphagia. Education  Patient Education Response: Verbalizes understanding     Communication Observation  Speech assessment completed on this date. Mild dysarthria is present as characterized by decreased lingual and labial ROM and strength. Even so, clinician ranks speech intelligibility approximately % intelligible with context known and background noise present. Oral Mechanism Examination:  Labial retraction: bilaterally decreased  Labial protrusion: bilaterally decreased  Labial compression: decreased   Labial coordination: slow  Lingual extension: decreased  Lingual elevation: decreased  Bilateral lingual movement: decreased  Lingual strength: weak  Lingual coordination: slow     Swallowing:   Clinical bedside swallow evaluation completed on this date. Pt alert, cooperative, and upright in bed. No family present during evaluation. Consistencies Administered: ice chips, puree consistencies, regular solids, and thin liquids via consecutive sips side of cup    Patient has dentures; however, reports he can masticate all solids without them.      Oral Prep: Decreased vertical jaw movement observed with ice chips and

## 2023-10-28 NOTE — CARE COORDINATION
10/28/23 1120   Readmission Assessment   Number of Days since last admission? 8-30 days   Previous Disposition Home with Family   Who is being Interviewed Patient   What was the patient's/caregiver's perception as to why they think they needed to return back to the hospital? Other (Comment)  (I stroke)   Did you visit your Primary Care Physician after you left the hospital, before you returned this time? No   Why weren't you able to visit your PCP? Did not have an appointment   Did you see a specialist, such as Cardiac, Pulmonary, Orthopedic Physician, etc. after you left the hospital? Yes   Who advised the patient to return to the hospital? Physician   Does the patient report anything that got in the way of taking their medications? No   In our efforts to provide the best possible care to you and others like you, can you think of anything that we could have done to help you after you left the hospital the first time, so that you might not have needed to return so soon? Other (Comment)  (Everything has been fine the nurses have been nice.  unrelated event.)

## 2023-10-30 ENCOUNTER — HOSPITAL ENCOUNTER (OUTPATIENT)
Dept: MRI IMAGING | Age: 73
Discharge: HOME | End: 2023-10-30
Payer: MEDICARE

## 2023-10-30 ENCOUNTER — TELEPHONE (OUTPATIENT)
Dept: NEUROLOGY | Age: 73
End: 2023-10-30

## 2023-10-30 DIAGNOSIS — G93.9 RIGHT PARIETAL LOBE LESION: ICD-10-CM

## 2023-10-30 DIAGNOSIS — G93.9 RIGHT PARIETAL LOBE LESION: Primary | ICD-10-CM

## 2023-10-30 DIAGNOSIS — H54.7 VISUAL LOSS: Primary | ICD-10-CM

## 2023-10-30 PROCEDURE — 6360000004 HC RX CONTRAST MEDICATION: Performed by: NEUROLOGICAL SURGERY

## 2023-10-30 PROCEDURE — 70553 MRI BRAIN STEM W/O & W/DYE: CPT

## 2023-10-30 PROCEDURE — A9577 INJ MULTIHANCE: HCPCS | Performed by: NEUROLOGICAL SURGERY

## 2023-10-30 RX ADMIN — GADOBENATE DIMEGLUMINE 20 ML: 529 INJECTION, SOLUTION INTRAVENOUS at 15:36

## 2023-10-30 NOTE — TELEPHONE ENCOUNTER
Patient was d/c and has an appointment today to complete MRI today but needs orders please could you place them for patient.  MRI Brain

## 2023-11-01 ENCOUNTER — OFFICE VISIT (OUTPATIENT)
Dept: CARDIOLOGY CLINIC | Age: 73
End: 2023-11-01
Payer: MEDICARE

## 2023-11-01 VITALS
DIASTOLIC BLOOD PRESSURE: 80 MMHG | WEIGHT: 224 LBS | BODY MASS INDEX: 30.34 KG/M2 | HEART RATE: 55 BPM | HEIGHT: 72 IN | SYSTOLIC BLOOD PRESSURE: 118 MMHG

## 2023-11-01 DIAGNOSIS — I48.0 PAROXYSMAL ATRIAL FIBRILLATION (HCC): Primary | ICD-10-CM

## 2023-11-01 PROCEDURE — 3074F SYST BP LT 130 MM HG: CPT | Performed by: INTERNAL MEDICINE

## 2023-11-01 PROCEDURE — 93282 PRGRMG EVAL IMPLANTABLE DFB: CPT | Performed by: INTERNAL MEDICINE

## 2023-11-01 PROCEDURE — 3079F DIAST BP 80-89 MM HG: CPT | Performed by: INTERNAL MEDICINE

## 2023-11-01 PROCEDURE — 1123F ACP DISCUSS/DSCN MKR DOCD: CPT | Performed by: INTERNAL MEDICINE

## 2023-11-01 PROCEDURE — 99214 OFFICE O/P EST MOD 30 MIN: CPT | Performed by: INTERNAL MEDICINE

## 2023-11-01 ASSESSMENT — ENCOUNTER SYMPTOMS
COUGH: 0
DIARRHEA: 0
VOMITING: 0
BLOOD IN STOOL: 0
WHEEZING: 0
SHORTNESS OF BREATH: 0
BACK PAIN: 0
ABDOMINAL PAIN: 0
ABDOMINAL DISTENTION: 0

## 2023-11-01 NOTE — PROGRESS NOTES
ICD interrogated  Presenting rhythm: AP/VS, AP 97.3%,  <0.1%  Battery status: 8.7 years  Lead status: lead impedance within range and stable  Sensing: P waves 2.5 mV,  R waves 8.4 mV  Thresholds:  Atrial 1.000 V @ 0.4ms, ventricular 0.750 @ 0.4ms  Observations:  None  See scanned report for details  Reprogramming for sensitivity and threshold testing  Next [facility] home check scheduled for 02/05/24
daily       No current facility-administered medications for this visit. Allergies:  Amiodarone, Histamine, Azithromycin, and Penicillins    Past Social History:  Social History     Socioeconomic History    Marital status:      Spouse name: Mrs. Ladarius Tsang    Number of children: 3    Years of education: Not on file    Highest education level: Not on file   Occupational History    Occupation: [address] for 32.5 years     Comment: Retired   Tobacco Use    Smoking status: Former     Packs/day: 1.75     Years: 50.00     Additional pack years: 0.00     Total pack years: 87.50     Types: Cigarettes, Pipe     Start date: 1963     Quit date: 6/27/2013     Years since quitting: 10.3    Smokeless tobacco: Never    Tobacco comments:     patient smoked 1.75ppd for 50 years from from age 15 in 80 and quit 6/27/2013. Used a pipe for a short time.     Vaping Use    Vaping Use: Never used   Substance and Sexual Activity    Alcohol use: Not Currently     Comment: Alcoholic in Remission since 1985    Drug use: Never    Sexual activity: Yes     Partners: Female     Comment: has 3 kids   Other Topics Concern    Not on file   Social History Narrative    CODE STATUS: Full Code    HEALTH CARE PROXY: his wife, Mrs. Ladarius Tsang, [phone]    AMBULATES: independantly    DOMICILED: has stairs in home that he uses, lives with his wife and gran daughter and 2 dogs and a cat        ** Merged History Encounter **      Social Determinants of Health     Financial Resource Strain: Not on file   Food Insecurity: Not on file   Transportation Needs: Not on file   Physical Activity: Not on file   Stress: Not on file   Social Connections: Not on file   Intimate Partner Violence: Not on file   Housing Stability: Not on file       Family History:       Problem Relation Age of Onset    No Known Problems Mother     Stroke Father     Heart Disease Father     High Blood Pressure Father     High Cholesterol Father     Other

## 2023-11-06 NOTE — TELEPHONE ENCOUNTER
Called patient with no answer or vm, then tried wife explained to her that I had them scheduled for a follow up appt to be seen with Dr Carolina Mcgarry to discuss MRI. She understood also I explained where our office was located.  Wife understood

## 2023-11-21 ENCOUNTER — OFFICE VISIT (OUTPATIENT)
Dept: NEUROLOGY | Age: 73
End: 2023-11-21
Payer: MEDICARE

## 2023-11-21 VITALS
DIASTOLIC BLOOD PRESSURE: 74 MMHG | OXYGEN SATURATION: 97 % | HEIGHT: 72 IN | WEIGHT: 228 LBS | HEART RATE: 68 BPM | BODY MASS INDEX: 30.88 KG/M2 | SYSTOLIC BLOOD PRESSURE: 122 MMHG

## 2023-11-21 DIAGNOSIS — H54.60 MONOCULAR VISION LOSS: Primary | ICD-10-CM

## 2023-11-21 PROCEDURE — 1123F ACP DISCUSS/DSCN MKR DOCD: CPT | Performed by: PSYCHIATRY & NEUROLOGY

## 2023-11-21 PROCEDURE — 99214 OFFICE O/P EST MOD 30 MIN: CPT | Performed by: PSYCHIATRY & NEUROLOGY

## 2023-11-21 PROCEDURE — 3078F DIAST BP <80 MM HG: CPT | Performed by: PSYCHIATRY & NEUROLOGY

## 2023-11-21 PROCEDURE — 3074F SYST BP LT 130 MM HG: CPT | Performed by: PSYCHIATRY & NEUROLOGY

## 2023-11-21 NOTE — PROGRESS NOTES
[facility] Neurology Office Note      Patient:   Ten Holliday  MR#:    [id number]  Account Number:                         YOB: 1950  Date of Evaluation:  11/21/2023  Time of Note:                          11:36 AM  Primary/Referring Physician:  Christine Addison MD   Consulting Physician:  Maddie Patel, DO    FOLLOW UP    Chief Complaint   Patient presents with    Follow-Up from Hospital    Results     Results from recent MRI        [address] [facility]    Ten Holliday is a 68y.o. year old male here for hospital follow up. Admitted with right monocular visual loss with an altitudinal visual field defect. Suspect a possible partial central retinal artery occlusion versus anterior ischemic optic neuropathy. CT head,  CTA head and neck largely negative. ESR/CRP normal. Recent ECHO with EF 30%, global hypokinesis noted, no mass / thrombus. MRI brain negative. Ophthalmology is following, has appointment back there next week. A fib history noted, on anticoagulation. On ASA and statin as well. Visual loss has improved but still noting some difficulty. Past Medical History:   Diagnosis Date    Alcohol abuse, in remission     Alcoholic in Remission since 1985    Arthritis     Atrial fibrillation ([address])     Atrial flutter ([address])     CAD (coronary artery disease)     Diabetes mellitus ([address])     diet controlled    Ex-smoker     quit 2013 / smoked 50 yrs @ 1.75 PPD for 87.5 pack - years    GERD (gastroesophageal reflux disease)     History of amiodarone therapy     Hyperlipidemia     [facility]      Hypertension     Hypokalemia     Hypotension     MI (myocardial infarction) ([address]) 06/2013    MI (myocardial infarction) ([address]) 07/2013    S/P CABG x 4 11/11/2013 6/27/13    Stroke ([address]) 08/22/2017    eye       Past Surgical History:   Procedure Laterality Date    ABLATION OF DYSRHYTHMIC FOCUS N/A 2015    ABLATION OF DYSRHYTHMIC FOCUS  2016    CARDIAC CATHETERIZATION  6/27/13

## 2024-02-05 DIAGNOSIS — Z95.810 AICD (AUTOMATIC CARDIOVERTER/DEFIBRILLATOR) PRESENT: Primary | ICD-10-CM

## 2024-02-05 DIAGNOSIS — I50.42 CHRONIC COMBINED SYSTOLIC AND DIASTOLIC HEART FAILURE (HCC): ICD-10-CM

## 2024-02-05 DIAGNOSIS — I47.29 NSVT (NONSUSTAINED VENTRICULAR TACHYCARDIA) (HCC): ICD-10-CM

## 2024-02-05 DIAGNOSIS — I25.5 ISCHEMIC CARDIOMYOPATHY: ICD-10-CM

## 2024-02-05 PROCEDURE — 93296 REM INTERROG EVL PM/IDS: CPT | Performed by: NURSE PRACTITIONER

## 2024-02-05 PROCEDURE — 93295 DEV INTERROG REMOTE 1/2/MLT: CPT | Performed by: NURSE PRACTITIONER

## 2024-05-13 ENCOUNTER — OFFICE VISIT (OUTPATIENT)
Dept: CARDIOLOGY CLINIC | Age: 74
End: 2024-05-13
Payer: MEDICARE

## 2024-05-13 VITALS
OXYGEN SATURATION: 96 % | WEIGHT: 231 LBS | SYSTOLIC BLOOD PRESSURE: 124 MMHG | HEART RATE: 62 BPM | DIASTOLIC BLOOD PRESSURE: 70 MMHG | BODY MASS INDEX: 31.29 KG/M2 | HEIGHT: 72 IN

## 2024-05-13 DIAGNOSIS — I10 ESSENTIAL HYPERTENSION: ICD-10-CM

## 2024-05-13 DIAGNOSIS — I25.10 CORONARY ARTERY DISEASE INVOLVING NATIVE CORONARY ARTERY OF NATIVE HEART, UNSPECIFIED WHETHER ANGINA PRESENT: Primary | ICD-10-CM

## 2024-05-13 DIAGNOSIS — Z95.1 HX OF CABG: ICD-10-CM

## 2024-05-13 DIAGNOSIS — Z79.01 CHRONIC ANTICOAGULATION: ICD-10-CM

## 2024-05-13 DIAGNOSIS — I48.0 PAROXYSMAL ATRIAL FIBRILLATION (HCC): ICD-10-CM

## 2024-05-13 DIAGNOSIS — E78.2 MIXED HYPERLIPIDEMIA: ICD-10-CM

## 2024-05-13 DIAGNOSIS — Z86.79 S/P ABLATION OF ATRIAL FIBRILLATION: ICD-10-CM

## 2024-05-13 DIAGNOSIS — Z98.890 S/P ABLATION OF ATRIAL FIBRILLATION: ICD-10-CM

## 2024-05-13 PROCEDURE — 93283 PRGRMG EVAL IMPLANTABLE DFB: CPT | Performed by: NURSE PRACTITIONER

## 2024-05-13 PROCEDURE — 3078F DIAST BP <80 MM HG: CPT | Performed by: NURSE PRACTITIONER

## 2024-05-13 PROCEDURE — 99214 OFFICE O/P EST MOD 30 MIN: CPT | Performed by: NURSE PRACTITIONER

## 2024-05-13 PROCEDURE — 3074F SYST BP LT 130 MM HG: CPT | Performed by: NURSE PRACTITIONER

## 2024-05-13 PROCEDURE — 1123F ACP DISCUSS/DSCN MKR DOCD: CPT | Performed by: NURSE PRACTITIONER

## 2024-05-13 RX ORDER — PANTOPRAZOLE SODIUM 40 MG/1
40 TABLET, DELAYED RELEASE ORAL DAILY
COMMUNITY

## 2024-05-13 NOTE — PATIENT INSTRUCTIONS
New instructions for today:    Weigh yourself daily without clothing at the same time each day.    Record a daily weight log.  Call the office if you gain 3 pounds or more in 2 to 3 days or 5 pounds in one week.   A sudden weight gain may mean that your heart failure is getting worse.  Sodium causes your body to hold on to extra water.   This may cause your heart failure symptoms to get worse.   Limit sodium to 2,000 milligrams (mg) a day or less.   That is less than 1 teaspoon of salt a day, including all the salt you eat in cooking or in packaged foods.  Fluid restriction of 1500ml per day (about 6 cups of fluid per day).    Eliquis can increase your risk of bleeding.  If you notice blood in urine or stool, bleeding gums, excessive bruising or cough productive of bloody sputum, notify the office.  Information on this blood thinner has been included in your after visit summary.    Patient Instructions:  Continue current medications as prescribed.   Always keep a current medication list. Bring your medications to every office visit.   Continue to follow up with primary care provider for non cardiac medical problems.  Call the office with any problems, questions or concerns at [phone].  If you have been asked to keep a blood pressure log, do so for 2 weeks. Call the office to report readings to the triage nurse at [phone].  Follow up with cardiologist as scheduled.  The following educational material has been included in this after visit summary for your review: Life simple 7.  Heart health.     Life simple 7  1) Manage blood pressure - high blood pressure is a major risk factor for heart disease and stroke. Keeping blood pressure in health range reduces strain on your heart, arteries and kidneys.  Blood pressure goal is less than 130/80.   2) Control cholesterol - contributes to plaque, which can clog arteries and lead to heart disease and stroke. When you control your cholesterol you are giving your

## 2024-05-13 NOTE — PROGRESS NOTES
ischemia  6/24/2016  TTE  Normal LVFX  7/2/2018 lexiscan Positive for inferior MI + myocardial ischemia, EF 50%, 12% ischemic myocardium on stress, intermediate risk findings, AUC indication 17, AUC score 7  7/11/18  Cath  Severe NVD, patent LIMA-LAD, patent yet extremely small VG-LAD, patent VG-RCA, closed VG-OM, anterior lateral akinesis, EF 35%  10/11/19  lexiscan Positive for inferior lateral myocardial ischemia, EF 51%, 1% ischemic myocardium on stress, low risk findings, AUC indication 15, AUC score 4, (MD Shay)   10/12/19  Cath  Severe NVD, patent LIMA-LAD, patent yet extremely small VG-LAD, patent VG-RCA, closed VG-OM, anterior lateral and inferior severe hypokinesis EF 30%    10/20/23 Lexiscan  There is large inferior to inferolateral infarct with no significant  ischemia, with a calculated ejection fraction of 36 %.  Suggest: No significant change from Lexiscan study 8/19/2020.  Consideration for medical management if no significant symptomatology.  Signed by Dr Mike Harvey    10/20/23 echo   Mildly dilated left ventricle. Left ventricular ejection fraction is   estimated at 30%. Mild concentric left ventricular hypertrophy. Severe   generalized hypokinesis of the left ventricle. Inferior and inferolateral   hypokinesis and scarring present. Grade III diastolic dysfunction is   present.   Normal size right ventricle cavity with mildly reduced global systolic   function.   Pacer wire noted in right heart.   Mildly dilated right atrium.   Mild regurgitation.   Mild tricuspid regurgitation.   Aortic root and ascending dimensions within normal limits.   The IVC is normal.   No evidence of significant pericardial effusion is noted.   The rhythm is sinus.   Electronically signed by Dixie Schroeder(Interpreting physician)   on 10/20/2023 10:03 AM    10/3/23 [facility] - Dr. Harvey  Impression:  Successful DC cardioversion of atrial fibrillation to normal sinus rhythm on Eliquis used for anticoagulation.  Initiate patient

## 2024-06-26 ENCOUNTER — OFFICE VISIT (OUTPATIENT)
Dept: PULMONOLOGY | Facility: CLINIC | Age: 74
End: 2024-06-26
Payer: MEDICARE

## 2024-06-26 VITALS
SYSTOLIC BLOOD PRESSURE: 132 MMHG | OXYGEN SATURATION: 98 % | DIASTOLIC BLOOD PRESSURE: 78 MMHG | HEIGHT: 72 IN | HEART RATE: 74 BPM | BODY MASS INDEX: 30.85 KG/M2 | WEIGHT: 227.8 LBS

## 2024-06-26 DIAGNOSIS — Z87.891 PERSONAL HISTORY OF NICOTINE DEPENDENCE: Chronic | ICD-10-CM

## 2024-06-26 DIAGNOSIS — Z12.2 SCREENING FOR MALIGNANT NEOPLASM OF RESPIRATORY ORGAN: ICD-10-CM

## 2024-06-26 DIAGNOSIS — J43.2 CENTRILOBULAR EMPHYSEMA: Chronic | ICD-10-CM

## 2024-06-26 DIAGNOSIS — J41.0 SIMPLE CHRONIC BRONCHITIS: Primary | Chronic | ICD-10-CM

## 2024-06-26 DIAGNOSIS — E66.9 OBESITY (BMI 30-39.9): Chronic | ICD-10-CM

## 2024-06-26 PROCEDURE — 1160F RVW MEDS BY RX/DR IN RCRD: CPT | Performed by: INTERNAL MEDICINE

## 2024-06-26 PROCEDURE — 99214 OFFICE O/P EST MOD 30 MIN: CPT | Performed by: INTERNAL MEDICINE

## 2024-06-26 PROCEDURE — 1159F MED LIST DOCD IN RCRD: CPT | Performed by: INTERNAL MEDICINE

## 2024-06-26 RX ORDER — SOTALOL HYDROCHLORIDE 80 MG/1
80 TABLET ORAL 2 TIMES DAILY
COMMUNITY

## 2024-06-26 NOTE — PATIENT INSTRUCTIONS
The patient is doing well clinically.  He is having no respiratory tract complaints.  I will plan on a follow-up screening CT in mid August and call him with results.  If that reveals no new or spacious nodules I will just see him back in 1 year.

## 2024-06-26 NOTE — ASSESSMENT & PLAN NOTE
He has had some emphysematous changes noted on prior CT scans but does not have any evidence of COPD by his most recent pulmonary function studies.

## 2024-06-26 NOTE — ASSESSMENT & PLAN NOTE
He quit smoking in 2013.  I will obtain a follow-up CT scan for screening purposes at [facility] in August and call him with results.

## 2024-06-26 NOTE — LETTER
[facility] RESPIRAT[facility] DI PAD  [facility]  [address] [address]  Dept: [phone]  Dept Fax: [phone]  Loc: [phone]  Loc Fax: [phone]  ?    2024    IMAGING REQUEST    FACILITY:  [facility]  FAX: [phone]    DATE OF PROCEDURE:  2023    IMAGING REQUESTED:  CT of Chest lung cancer screening    NAME:  Eric Ramey  :  1950    Drew Bonilla MD is requesting the above imaging to be pushed to [facility] at your earliest convenience.    Thank You & Have a Blessed Day!  Polly GONZALEZ CMA

## 2024-06-26 NOTE — ASSESSMENT & PLAN NOTE
He has not had any problems with bronchitis recently.  I told him if he has any such problems in the future he can contact contact the office if he develops purulent sputum production for which he might need antibiotics and can utilize over-the-counter Mucinex as needed for chest congestion.

## 2024-06-26 NOTE — ASSESSMENT & PLAN NOTE
Patient's (Body mass index is 30.89 kg/m².) indicates that they are obese (BMI >30) with health conditions that include coronary heart disease . Weight is improving with lifestyle modifications.  Diet and exercise are encouraged and he will follow-up with his primary care physician regarding his elevated BMI otherwise.

## 2024-06-26 NOTE — PROGRESS NOTES
Chief Complaint  Simple chronic bronchitis    Subjective    History of Present Illness {  Problem List  Visit Diagnosis   Encounters  Notes  Medications  Labs  Result Review Imaging  Media: 23}    Eric Ramey presents to [facility] for simple chronic bronchitis.    History of Present Illness   The patient is doing very well from the standpoint of his bronchitis.  He would be due for a follow-up screening CT in August and I will schedule that to be performed at [facility] and call him with results.  I reviewed his most recent CT with him from last year and it revealed no new or suspicious lesions.  I did tell him if the scan is stable we will just plan on continuing yearly visits.  If any new or suspicious nodules are noted I will have him return to go over that here in the office.  He is having no respiratory tract complaints at this time.  He has had the COVID-19 vaccine including a standard booster and a bivalent booster in the form of the Moderna vaccine and more recently received a Comirnaty Pfizer booster.  He had the flu shot this past fall.  He has had a Prevnar vaccine and an RSV vaccine in the past as well.  Prior to Admission medications    Medication Sig Start Date End Date Taking? Authorizing Provider   apixaban (ELIQUIS) 5 MG tablet tablet TAKE 1 TABLET BY MOUTH 2 TIMES DAILY 10/8/17  Yes ProviderAquiles MD   atorvastatin (LIPITOR) 80 MG tablet Take 1 tablet by mouth.   Yes Provider, MD Aquiles   Cholecalciferol (VITAMIN D) 2000 UNITS capsule 1/2 tablet once a day   Yes Provider, MD Aquiles   empagliflozin (Jardiance) 25 MG tablet tablet Take 1 tablet by mouth Daily.   Yes Provider, MD Aquiles   furosemide (LASIX) 80 MG tablet Take  by mouth.   Yes Provider, MD Aquiles   isosorbide mononitrate (IMDUR) 60 MG 24 hr tablet Take 1 tablet by mouth 2 (Two) Times a Day. 1/28/19  Yes ProviderAquiles MD   lisinopril  "(PRINIVIL,ZESTRIL) 2.5 MG tablet Take 1 tablet by mouth. 10/12/19  Yes Aquiles Emanuel MD   nitroglycerin (NITROSTAT) 0.4 MG SL tablet up to max of 3 total doses. If no relief after 1 dose, call 911. 18  Yes Aquiles Emanuel MD   pantoprazole (PROTONIX) 20 MG EC tablet Take 1 tablet by mouth Daily.   Yes Aquiles Emanuel MD   potassium chloride (K-DUR,KLOR-CON) 20 MEQ CR tablet Take  by mouth Daily.   Yes Aquiles Emanuel MD   ranolazine (RANEXA) 1000 MG 12 hr tablet Take 1 tablet by mouth 2 (two) times a day. 6/10/19  Yes Aquiles Emanuel MD   sotalol (BETAPACE) 80 MG tablet Take 1 tablet by mouth 2 (Two) Times a Day.   Yes Aquiles Emanuel MD   aspirin 81 MG tablet Take 81 mg by mouth daily    Aquiles Emanuel MD   metoprolol succinate XL (TOPROL-XL) 25 MG 24 hr tablet Take 1 tablet by mouth 2 (two) times a day. Extended Release  Patient not taking: Reported on 2024    Aquiles Emanuel MD   metoprolol tartrate (LOPRESSOR) 25 MG tablet Take 1 tablet by mouth 2 (Two) Times a Day.  Patient not taking: Reported on 2024   Aquiles Emanuel MD       Social History     Socioeconomic History    Marital status:    Tobacco Use    Smoking status: Former     Current packs/day: 0.00     Average packs/day: 2.0 packs/day for 52.0 years (104.0 ttl pk-yrs)     Types: Cigarettes     Start date:      Quit date: 2013     Years since quittin.4     Passive exposure: Past    Smokeless tobacco: Never   Vaping Use    Vaping status: Never Used   Substance and Sexual Activity    Alcohol use: No    Drug use: No    Sexual activity: Defer       Objective   Vital Signs:   /78   Pulse 74   Ht 182.9 cm (72.01\")   Wt 103 kg (227 lb 12.8 oz)   SpO2 98% Comment: RA  BMI 30.89 kg/m²     Physical Exam  Vitals and nursing note reviewed.   Constitutional:       Appearance: He is obese.   HENT:      Head: Normocephalic.   Eyes:      Extraocular Movements: " Extraocular movements intact.      Pupils: Pupils are equal, round, and reactive to light.   Cardiovascular:      Rate and Rhythm: Normal rate and regular rhythm.   Pulmonary:      Effort: Pulmonary effort is normal.      Breath sounds: Normal breath sounds.   Musculoskeletal:         General: Normal range of motion.   Skin:     General: Skin is warm and dry.   Neurological:      General: No focal deficit present.      Mental Status: He is alert and oriented to person, place, and time.   Psychiatric:         Mood and Affect: Mood normal.         Behavior: Behavior normal.        Result Review :          No results found for this or any previous visit.                 My interpretation of imaging:    CT outside films (08/11/2023 00:00)         Assessment and Plan {CC Problem List  Visit Diagnosis  ROS  Review (Popup)  [employer] Maintenance  Quality  BestPractice  Medications  SmartSets  SnapShot Encounters  Media : 23}    Diagnoses and all orders for this visit:    1. Simple chronic bronchitis (Primary)  Assessment & Plan:  He has not had any problems with bronchitis recently.  I told him if he has any such problems in the future he can contact contact the office if he develops purulent sputum production for which he might need antibiotics and can utilize over-the-counter Mucinex as needed for chest congestion.      2. Centrilobular emphysema  Assessment & Plan:  He has had some emphysematous changes noted on prior CT scans but does not have any evidence of COPD by his most recent pulmonary function studies.      3. Personal history of nicotine dependence  Assessment & Plan:  He quit smoking in 2013.  I will obtain a follow-up CT scan for screening purposes at [facility] in August and call him with results.    Orders:  -      CT Chest Low Dose Cancer Screening WO; Future    4. Obesity (BMI 30-39.9)  Assessment & Plan:  Patient's (Body mass index is 30.89 kg/m².) indicates that they are obese (BMI >30) with health  conditions that include coronary heart disease . Weight is improving with lifestyle modifications.  Diet and exercise are encouraged and he will follow-up with his primary care physician regarding his elevated BMI otherwise.      5. Screening for malignant neoplasm of respiratory organ  -      CT Chest Low Dose Cancer Screening WO; Future          Drew Bonilla MD  6/26/2024  16:39 CDT    Follow Up   Return in about 1 year (around 6/26/2025) for To see me specifically.    Patient was given instructions and counseling regarding his condition or for health maintenance advice. Please see specific information pulled into the AVS if appropriate.

## 2024-07-24 ENCOUNTER — TELEPHONE (OUTPATIENT)
Dept: PULMONOLOGY | Facility: CLINIC | Age: 74
End: 2024-07-24
Payer: MEDICARE

## 2024-07-24 NOTE — TELEPHONE ENCOUNTER
Caller: Eric Ramey    Relationship to patient: Self    Best call back number: [phone]     Patient is needing: PATIENT STATES THAT HE RECEIVED A LETTER FROM [facility] STATING THAT THEY NEEDS ORDERS AND INFORMATION FOR CT SCAN SCHEDULED FOR AUG. PLEASE SEND THIS AND UPDATE PATIENT.

## 2024-07-24 NOTE — TELEPHONE ENCOUNTER
Patient informed that his appointment is 08/21/24 at 8:30 am with an 8:15 am arrival.  He voiced understanding.

## 2024-08-15 DIAGNOSIS — I47.29 NSVT (NONSUSTAINED VENTRICULAR TACHYCARDIA) (HCC): ICD-10-CM

## 2024-08-15 DIAGNOSIS — I49.5 SICK SINUS SYNDROME (HCC): ICD-10-CM

## 2024-08-15 DIAGNOSIS — Z95.810 AICD (AUTOMATIC CARDIOVERTER/DEFIBRILLATOR) PRESENT: Primary | ICD-10-CM

## 2024-08-15 PROCEDURE — 93295 DEV INTERROG REMOTE 1/2/MLT: CPT | Performed by: CLINICAL NURSE SPECIALIST

## 2024-08-15 PROCEDURE — 93296 REM INTERROG EVL PM/IDS: CPT | Performed by: CLINICAL NURSE SPECIALIST

## 2024-08-21 ENCOUNTER — HOSPITAL ENCOUNTER (OUTPATIENT)
Dept: CT IMAGING | Age: 74
Discharge: HOME | End: 2024-08-21
Payer: MEDICARE

## 2024-08-21 DIAGNOSIS — Z87.891 PERSONAL HISTORY OF SMOKING: ICD-10-CM

## 2024-08-21 DIAGNOSIS — Z12.2 ENCOUNTER FOR SCREENING FOR MALIGNANT NEOPLASM OF RESPIRATORY ORGANS: ICD-10-CM

## 2024-08-21 PROCEDURE — 71271 CT THORAX LUNG CANCER SCR C-: CPT

## 2024-09-03 DIAGNOSIS — Z12.2 SCREENING FOR MALIGNANT NEOPLASM OF RESPIRATORY ORGAN: ICD-10-CM

## 2024-09-03 DIAGNOSIS — Z87.891 PERSONAL HISTORY OF NICOTINE DEPENDENCE: Chronic | ICD-10-CM

## 2024-09-04 ENCOUNTER — TELEPHONE (OUTPATIENT)
Dept: PULMONOLOGY | Facility: CLINIC | Age: 74
End: 2024-09-04
Payer: MEDICARE

## 2024-09-04 NOTE — TELEPHONE ENCOUNTER
----- Message from Drew Bonilla sent at 9/4/2024  9:46 AM CDT -----  Please call the patient to let him know we received his chest CT report from [facility] and it was stable with no new or suspicious lesions.  The recommendation would be just continue yearly screening chest CTs.  Also please let him know I am sorry I did not have the report sooner but even though it was done on August 21 it was not read until this past Monday, September 2 and was not sent to my inbox until yesterday September 3.

## 2024-12-02 DIAGNOSIS — I25.5 ISCHEMIC CARDIOMYOPATHY: ICD-10-CM

## 2024-12-02 DIAGNOSIS — Z95.810 AICD (AUTOMATIC CARDIOVERTER/DEFIBRILLATOR) PRESENT: Primary | ICD-10-CM

## 2024-12-02 DIAGNOSIS — I49.5 SICK SINUS SYNDROME (HCC): ICD-10-CM

## 2025-01-13 ENCOUNTER — HOSPITAL ENCOUNTER (OUTPATIENT)
Dept: GENERAL RADIOLOGY | Facility: HOSPITAL | Age: 75
Discharge: HOME | End: 2025-01-13
Payer: MEDICARE

## 2025-01-13 ENCOUNTER — TRANSCRIBE ORDERS (OUTPATIENT)
Dept: ADMINISTRATIVE | Facility: HOSPITAL | Age: 75
End: 2025-01-13
Payer: MEDICARE

## 2025-01-13 DIAGNOSIS — R05.8 PRODUCTIVE COUGH: ICD-10-CM

## 2025-01-13 DIAGNOSIS — R05.8 PRODUCTIVE COUGH: Primary | ICD-10-CM

## 2025-01-13 PROCEDURE — 71046 X-RAY EXAM CHEST 2 VIEWS: CPT

## 2025-01-16 DIAGNOSIS — Z95.810 AICD (AUTOMATIC CARDIOVERTER/DEFIBRILLATOR) PRESENT: Primary | ICD-10-CM

## 2025-01-16 DIAGNOSIS — I48.0 PAROXYSMAL ATRIAL FIBRILLATION (HCC): ICD-10-CM

## 2025-01-16 DIAGNOSIS — I25.5 ISCHEMIC CARDIOMYOPATHY: ICD-10-CM

## 2025-01-22 DIAGNOSIS — Z95.810 AICD (AUTOMATIC CARDIOVERTER/DEFIBRILLATOR) PRESENT: Primary | ICD-10-CM

## 2025-01-22 DIAGNOSIS — I48.0 PAROXYSMAL ATRIAL FIBRILLATION (HCC): ICD-10-CM

## 2025-01-22 DIAGNOSIS — I48.92 ATRIAL FLUTTER, UNSPECIFIED TYPE (HCC): ICD-10-CM

## 2025-01-23 ENCOUNTER — TELEPHONE (OUTPATIENT)
Dept: CARDIOLOGY CLINIC | Age: 75
End: 2025-01-23

## 2025-01-23 DIAGNOSIS — Z01.818 PRE-OP TESTING: Primary | ICD-10-CM

## 2025-01-23 NOTE — TELEPHONE ENCOUNTER
Patient returning a called back to the office . Patient said needs get scheduled for a procedure. Please called patient.      Thank you

## 2025-01-23 NOTE — TELEPHONE ENCOUNTER
[place] at the [facility] [place] ([facility]) located on the first floor of [facility]. Enter through hospital main entrance and turn immediately to your left.     Procedure Date: 02/04/25  [facility]over from cath lab will call patient with his arrival time the day prior  (Times are subject to change)     Please have your lab work (CBC and CMP) done before your procedure Date.     Procedure Instructions:   1) You will need to not have anything to eat or drink the morning before the procedure.   2) You can still take all of your morning medication with a sip of water   3) You will need a  for the procedure.      Went over the above instructions with patient and he voiced his understanding.

## 2025-02-02 NOTE — H&P
Patient:  Ken Ventura                  1950  MRN: [id number]    PROBLEM LIST:    Patient Active Problem List    Diagnosis Date Noted    Ischemic cardiomyopathy 02/23/2021     Priority: High    Dizziness 11/04/2020     Priority: Low    PSVT (paroxysmal supraventricular tachycardia) (HCC) 09/24/2020     Priority: Low    PAC (premature atrial contraction) 09/24/2020     Priority: Low    Hx of CABG 08/13/2020     Priority: Low    Type 2 diabetes mellitus without complication, without long-term current use of insulin (HCC) 03/19/2020     Priority: Low     Overview Note:     Diet-controlled per patient.  Recheck hemoglobin A1c and other labs.      Obesity (BMI 30.0-34.9) 03/19/2020     Priority: Low     Overview Note:     Recommended at least 150 minutes of exercise per week and resistance training 2 days a week. Discussed healthy diet habits. Offered suggestions for calorie counting.        Simple chronic bronchitis (HCC) 06/17/2019     Priority: Low     Overview Note:     Patient is followed by pulmonology Dr. Montes, annual low-dose CT, patient states he does not have COPD and documentation supports this.      Essential hypertension 04/26/2018     Priority: Low     Overview Note:     Stable, continue lisinopril, Imdur, Lopressor      Chronic anticoagulation 04/26/2018     Priority: Low     Overview Note:     Eliquis      Amaurosis fugax 10/18/2017     Priority: Low    Chronic combined systolic and diastolic heart failure (HCC)      Priority: Low    Sick sinus syndrome (HCC)      Priority: Low    Paroxysmal atrial fibrillation (HCC)      Priority: Low    Chest pain 06/22/2016     Priority: Low    Ex-smoker      Priority: Low     Overview Note:     quit 2013 /smoked 50 yrs      S/P ablation of atrial fibrillation 11/11/2013     Priority: Low     Overview Note:     6/27/13      History of amiodarone therapy      Priority: Low    Mixed hyperlipidemia      Priority: Low     Overview Note:     Check lipids      Coronary

## 2025-02-04 ENCOUNTER — ANESTHESIA (OUTPATIENT)
Age: 75
End: 2025-02-04
Payer: MEDICARE

## 2025-02-04 ENCOUNTER — ANESTHESIA EVENT (OUTPATIENT)
Age: 75
End: 2025-02-04
Payer: MEDICARE

## 2025-02-04 ENCOUNTER — HOSPITAL ENCOUNTER (OUTPATIENT)
Age: 75
Discharge: HOME | End: 2025-02-06
Attending: INTERNAL MEDICINE
Payer: MEDICARE

## 2025-02-04 VITALS
SYSTOLIC BLOOD PRESSURE: 88 MMHG | RESPIRATION RATE: 11 BRPM | OXYGEN SATURATION: 99 % | WEIGHT: 224 LBS | BODY MASS INDEX: 30.34 KG/M2 | HEART RATE: 81 BPM | HEIGHT: 72 IN | DIASTOLIC BLOOD PRESSURE: 59 MMHG | TEMPERATURE: 98 F

## 2025-02-04 DIAGNOSIS — I48.0 PAROXYSMAL ATRIAL FIBRILLATION (HCC): ICD-10-CM

## 2025-02-04 LAB
ANION GAP SERPL CALCULATED.3IONS-SCNC: 14 MMOL/L (ref 8–16)
BUN SERPL-MCNC: 20 MG/DL (ref 8–23)
CALCIUM SERPL-MCNC: 9.7 MG/DL (ref 8.8–10.2)
CHLORIDE SERPL-SCNC: 99 MMOL/L (ref 98–107)
CO2 SERPL-SCNC: 25 MMOL/L (ref 22–29)
CREAT SERPL-MCNC: 1 MG/DL (ref 0.7–1.2)
ECHO BSA: 2.27 M2
EKG P AXIS: NORMAL DEGREES
EKG P AXIS: NORMAL DEGREES
EKG P-R INTERVAL: NORMAL MS
EKG P-R INTERVAL: NORMAL MS
EKG Q-T INTERVAL: 408 MS
EKG Q-T INTERVAL: 420 MS
EKG QRS DURATION: 112 MS
EKG QRS DURATION: 94 MS
EKG QTC CALCULATION (BAZETT): 435 MS
EKG QTC CALCULATION (BAZETT): 441 MS
EKG T AXIS: -67 DEGREES
EKG T AXIS: -67 DEGREES
ERYTHROCYTE [DISTWIDTH] IN BLOOD BY AUTOMATED COUNT: 13.1 % (ref 11.5–14.5)
GLUCOSE SERPL-MCNC: 140 MG/DL (ref 70–99)
HCT VFR BLD AUTO: 47.5 % (ref 42–52)
HGB BLD-MCNC: 15.9 G/DL (ref 14–18)
MCH RBC QN AUTO: 33.1 PG (ref 27–31)
MCHC RBC AUTO-ENTMCNC: 33.5 G/DL (ref 33–37)
MCV RBC AUTO: 99 FL (ref 80–94)
PLATELET # BLD AUTO: 230 K/UL (ref 130–400)
PMV BLD AUTO: 9.4 FL (ref 9.4–12.4)
POTASSIUM SERPL-SCNC: 4.1 MMOL/L (ref 3.5–5)
RBC # BLD AUTO: 4.8 M/UL (ref 4.7–6.1)
SODIUM SERPL-SCNC: 138 MMOL/L (ref 136–145)
WBC # BLD AUTO: 6.8 K/UL (ref 4.8–10.8)

## 2025-02-04 PROCEDURE — 92960 CARDIOVERSION ELECTRIC EXT: CPT

## 2025-02-04 PROCEDURE — 80048 BASIC METABOLIC PNL TOTAL CA: CPT

## 2025-02-04 PROCEDURE — 2580000003 HC RX 258: Performed by: NURSE ANESTHETIST, CERTIFIED REGISTERED

## 2025-02-04 PROCEDURE — 93005 ELECTROCARDIOGRAM TRACING: CPT

## 2025-02-04 PROCEDURE — 92960 CARDIOVERSION ELECTRIC EXT: CPT | Performed by: INTERNAL MEDICINE

## 2025-02-04 PROCEDURE — 2580000003 HC RX 258: Performed by: INTERNAL MEDICINE

## 2025-02-04 PROCEDURE — 6360000002 HC RX W HCPCS: Performed by: NURSE ANESTHETIST, CERTIFIED REGISTERED

## 2025-02-04 PROCEDURE — 99152 MOD SED SAME PHYS/QHP 5/>YRS: CPT | Performed by: INTERNAL MEDICINE

## 2025-02-04 PROCEDURE — 7100000010 HC PHASE II RECOVERY - FIRST 15 MIN: Performed by: INTERNAL MEDICINE

## 2025-02-04 PROCEDURE — 36415 COLL VENOUS BLD VENIPUNCTURE: CPT

## 2025-02-04 PROCEDURE — 3700000001 HC ADD 15 MINUTES (ANESTHESIA): Performed by: INTERNAL MEDICINE

## 2025-02-04 PROCEDURE — 3700000000 HC ANESTHESIA ATTENDED CARE: Performed by: INTERNAL MEDICINE

## 2025-02-04 PROCEDURE — 7100000011 HC PHASE II RECOVERY - ADDTL 15 MIN: Performed by: INTERNAL MEDICINE

## 2025-02-04 PROCEDURE — 85027 COMPLETE CBC AUTOMATED: CPT

## 2025-02-04 RX ORDER — PROPOFOL 10 MG/ML
INJECTION, EMULSION INTRAVENOUS
Status: DISCONTINUED | OUTPATIENT
Start: 2025-02-04 | End: 2025-02-04 | Stop reason: SDUPTHER

## 2025-02-04 RX ORDER — SODIUM CHLORIDE 9 MG/ML
INJECTION, SOLUTION INTRAVENOUS CONTINUOUS
Status: DISCONTINUED | OUTPATIENT
Start: 2025-02-04 | End: 2025-02-08 | Stop reason: HOSPADM

## 2025-02-04 RX ORDER — SODIUM CHLORIDE, SODIUM LACTATE, POTASSIUM CHLORIDE, CALCIUM CHLORIDE 600; 310; 30; 20 MG/100ML; MG/100ML; MG/100ML; MG/100ML
INJECTION, SOLUTION INTRAVENOUS
Status: DISCONTINUED | OUTPATIENT
Start: 2025-02-04 | End: 2025-02-04 | Stop reason: SDUPTHER

## 2025-02-04 RX ORDER — LIDOCAINE HYDROCHLORIDE 10 MG/ML
INJECTION, SOLUTION EPIDURAL; INFILTRATION; INTRACAUDAL; PERINEURAL
Status: DISCONTINUED | OUTPATIENT
Start: 2025-02-04 | End: 2025-02-04 | Stop reason: SDUPTHER

## 2025-02-04 RX ADMIN — PROPOFOL 100 MG: 10 INJECTION, EMULSION INTRAVENOUS at 08:12

## 2025-02-04 RX ADMIN — SODIUM CHLORIDE: 9 INJECTION, SOLUTION INTRAVENOUS at 07:14

## 2025-02-04 RX ADMIN — SODIUM CHLORIDE, SODIUM LACTATE, POTASSIUM CHLORIDE, AND CALCIUM CHLORIDE: 600; 310; 30; 20 INJECTION, SOLUTION INTRAVENOUS at 08:05

## 2025-02-04 RX ADMIN — LIDOCAINE HYDROCHLORIDE 50 MG: 10 INJECTION, SOLUTION EPIDURAL; INFILTRATION; INTRACAUDAL; PERINEURAL at 08:12

## 2025-02-04 NOTE — ANESTHESIA POSTPROCEDURE EVALUATION
Department of Anesthesiology  Postprocedure Note    Patient: Ken Ventura  MRN: [id number]  YOB: 1950  Date of evaluation: 2/4/2025    Procedure Summary       Date: 02/04/25 Room / Location: [facility] Cardiac Cath Lab    Anesthesia Start: 0805 Anesthesia Stop: 0824    Procedure: CARDIOVERSION EXTERNAL Diagnosis: Paroxysmal atrial fibrillation (HCC)    Scheduled Providers: Mike Harvey MD Responsible Provider: Willam Gavin APRN - CRNA    Anesthesia Type: general ASA Status: 3            Anesthesia Type: No value filed.    Sanam Phase I:      Sanam Phase II:      Anesthesia Post Evaluation    Patient location during evaluation: bedside  Patient participation: complete - patient participated  Level of consciousness: awake and alert  Pain score: 0  Airway patency: patent  Nausea & Vomiting: no nausea  Cardiovascular status: hemodynamically stable  Respiratory status: acceptable  Hydration status: euvolemic  Comments: BP 98/61   Pulse 70   Temp 98 °F (36.7 °C)   Resp 14   Ht 1.829 m (6')   Wt 101.6 kg (224 lb)   SpO2 99%   BMI 30.38 kg/m²       No notable events documented.

## 2025-02-04 NOTE — PROGRESS NOTES
TRANSFER - OUT REPORT:    Verbal report given to Regina Ventura being transferred to Cath [facility] for routine post-op       Report consisted of patient's Situation, Background, Assessment and   Recommendations(SBAR).     Information from the following report(s) Nurse Handoff Report was reviewed with the receiving nurse.    Opportunity for questions and clarification was provided.      Patient transported with:   Registered Nurse

## 2025-02-04 NOTE — PROGRESS NOTES
Discharge instructions reviewed with patient and patient states understanding. Patient discharged from cath holding with all belongings and AVS.  Electronically signed by Regina Guzman RN on 2/4/2025 at 9:55 AM

## 2025-02-04 NOTE — ANESTHESIA PRE PROCEDURE
Department of Anesthesiology  Preprocedure Note       Name:  Ken Ventura   Age:  74 y.o.  :  1950                                          MRN:  [id number]         Date:  2025      Surgeon: * No surgeons listed *    Procedure: * No procedures listed *    Medications prior to admission:   Prior to Admission medications    Medication Sig Start Date End Date Taking? Authorizing Provider   pantoprazole (PROTONIX) 40 MG tablet Take 1 tablet by mouth daily   Yes Ligia Lamar MD   sotalol (BETAPACE) 80 MG tablet Take 1 tablet by mouth 2 times daily 10/5/23  Yes Mike Harvey MD   JARDIANCE 25 MG tablet Take 1 tablet by mouth daily 23  Yes ProviderLigia MD   isosorbide mononitrate (IMDUR) 60 MG extended release tablet Take (1) tab AM and 1-1/2 tab PM 20  Yes Raissa Go APRN   lisinopril (PRINIVIL;ZESTRIL) 2.5 MG tablet Take 1 tablet by mouth daily 10/12/19  Yes Mikey Day MD   ranolazine (RANEXA) 1000 MG extended release tablet Take 1 tablet by mouth 2 times daily 6/10/19  Yes Anabel Nunez APRN   potassium chloride (KLOR-CON) 20 MEQ packet Take 1/2 tablet daily   Yes ProviderLigia MD   atorvastatin (LIPITOR) 80 MG tablet Take 1 tablet by mouth nightly  Patient taking differently: Take 1 tablet by mouth every evening 18  Yes Anabel Nunez APRN   ELIQUIS 5 MG TABS tablet TAKE 1 TABLET BY MOUTH 2 TIMES DAILY 10/8/17  Yes ProviderLigia MD   furosemide (LASIX) 80 MG tablet Take 1 tablet by mouth daily 6/15/17  Yes Sary Norman APRN   aspirin 81 MG tablet Take 1 tablet by mouth daily   Yes ProviderLigia MD   Cholecalciferol (VITAMIN D) 2000 UNITS CAPS capsule daily   Yes Ligia Lamar MD   nitroGLYCERIN (NITROSTAT) 0.4 MG SL tablet up to max of 3 total doses. If no relief after 1 dose, call 911. 18   Mikey Day MD       Current medications:    Current Outpatient Medications   Medication Sig Dispense Refill   • pantoprazole

## 2025-02-04 NOTE — PROCEDURES
PROCEDURE NOTE  Date: 2/4/2025   Name: Ken Ventura  YOB: 1950    Procedures    Date of Procedure:  2/4/2025     Indications:  Atrial fibrillation with an appropriate duration of anticoagulation     Conscious Sedation Protocol Used During this Procedure -anesthesia provided by anesthesia service        Anesthesia: Moderate   Sedation: 0 mg Midazolam (Versed)  0 mcg Fentanyl   Start time: 8:10 AM   Stop time: 8:15 AM   ASA Class: 3   EBL Not applicable     A trained medical personnel administered medications at my direction.  The patient was monitored continuously with ECG, pulse oximetry, blood pressure monitoring and direct observation.       After obtaining informed written consent and an appropriate level of conscious sedation, DCCV with 360 J x 1 using paddles was successful in restoring sinus rhythm.      Complications:  none      Impression:  Successful DC cardioversion of atrial fibrillation to normal sinus rhythm on Eliquis used for anticoagulation.  Continue Betapace.    Electronically signed by Mike Harvey MD on 2/4/25

## 2025-02-17 DIAGNOSIS — Z95.810 AICD (AUTOMATIC CARDIOVERTER/DEFIBRILLATOR) PRESENT: Primary | ICD-10-CM

## 2025-02-17 DIAGNOSIS — I25.5 ISCHEMIC CARDIOMYOPATHY: ICD-10-CM

## 2025-02-17 DIAGNOSIS — I48.0 PAROXYSMAL ATRIAL FIBRILLATION (HCC): ICD-10-CM

## 2025-02-18 DIAGNOSIS — I48.0 PAROXYSMAL ATRIAL FIBRILLATION (HCC): Primary | ICD-10-CM

## 2025-02-26 ENCOUNTER — TELEPHONE (OUTPATIENT)
Dept: CARDIOLOGY CLINIC | Age: 75
End: 2025-02-26

## 2025-02-26 NOTE — TELEPHONE ENCOUNTER
Patient left message stating he is going to be on a long plane ride to [place] and wanted to know if he needed to wear compression stockings the whole plane ride. Please advise.

## 2025-03-03 NOTE — TELEPHONE ENCOUNTER
Radha Cordero,  History of systolic HF EF 35% - if he normally wears compression stockings, I would do so during the flight as well.  Recommend moving about the airplane cabin periodically when allowed and to stay hydrated.  Avoid crossing legs.  Thanks Kaylah

## 2025-03-18 ENCOUNTER — OFFICE VISIT (OUTPATIENT)
Dept: CARDIOLOGY CLINIC | Age: 75
End: 2025-03-18
Payer: MEDICARE

## 2025-03-18 VITALS
HEART RATE: 80 BPM | HEIGHT: 72 IN | DIASTOLIC BLOOD PRESSURE: 72 MMHG | BODY MASS INDEX: 33.18 KG/M2 | WEIGHT: 245 LBS | SYSTOLIC BLOOD PRESSURE: 124 MMHG

## 2025-03-18 DIAGNOSIS — I48.0 PAROXYSMAL ATRIAL FIBRILLATION (HCC): Primary | ICD-10-CM

## 2025-03-18 DIAGNOSIS — Z01.818 PRE-OP TESTING: ICD-10-CM

## 2025-03-18 PROCEDURE — 1159F MED LIST DOCD IN RCRD: CPT | Performed by: INTERNAL MEDICINE

## 2025-03-18 PROCEDURE — 93000 ELECTROCARDIOGRAM COMPLETE: CPT | Performed by: INTERNAL MEDICINE

## 2025-03-18 PROCEDURE — 3074F SYST BP LT 130 MM HG: CPT | Performed by: INTERNAL MEDICINE

## 2025-03-18 PROCEDURE — 99204 OFFICE O/P NEW MOD 45 MIN: CPT | Performed by: INTERNAL MEDICINE

## 2025-03-18 PROCEDURE — 1123F ACP DISCUSS/DSCN MKR DOCD: CPT | Performed by: INTERNAL MEDICINE

## 2025-03-18 PROCEDURE — 3078F DIAST BP <80 MM HG: CPT | Performed by: INTERNAL MEDICINE

## 2025-03-18 NOTE — PROGRESS NOTES
Pacemaker interrogated.  EGM shows atrial arrhythmia.  Patient is here to see Dr. Mazariegos per Dr. Harvey.  Patient had cardioversion 2/4/25 and went back into AF.  Patient is symptomatic with his AF.  
Topics Concern    Not on file   Social History Narrative    CODE STATUS: Full Code    HEALTH CARE PROXY: his wife, Mrs. Avis Ventura, [phone]    AMBULATES: independantly    DOMICILED: has stairs in home that he uses, lives with his wife and gran daughter and 2 dogs and a cat        ** Merged History Encounter **      Social Drivers of Health     Financial Resource Strain: Not on file   Food Insecurity: Not on file   Transportation Needs: Not on file   Physical Activity: Not on file   Stress: Not on file   Social Connections: Unknown (10/11/2023)    Received from [employer] [facility]    Family and Community Support     Help with Day-to-Day Activities: Not on file     Lonely or Isolated: Not on file   Intimate Partner Violence: Unknown (10/11/2023)    Received from [employer]    Abuse Screen     Unsafe at Home or Work/School: Not on file     Feels Threatened by Someone?: Not on file     Does Anyone Keep You from Contacting Others or Doint Things Outside the Home?: Not on file     Physical Sign of Abuse Present: Not on file   Housing Stability: Unknown (10/11/2023)    Received from [employer]    Housing Stability     Current Living Arrangements: Not on file     Potentially Unsafe Housing Conditions: Not on file       Allergies   Allergen Reactions    Amiodarone Rash    Histamine      States \"Bug Spray\" exposure gives him itching and burning skin    Azithromycin     Penicillins Other (See Comments)     Don't know reaction, was a child. But grandparents(who raised him) said to not take    Sotalol Hcl Other (See Comments)         Current Outpatient Medications:     pantoprazole (PROTONIX) 40 MG tablet, Take 1 tablet by mouth daily, Disp: , Rfl:     sotalol (BETAPACE) 80 MG tablet, Take 1 tablet by mouth 2 times daily, Disp: 60 tablet, Rfl: 5    JARDIANCE 25 MG tablet, Take 1 tablet by mouth daily,

## 2025-03-20 ENCOUNTER — OFFICE VISIT (OUTPATIENT)
Dept: INTERNAL MEDICINE | Age: 75
End: 2025-03-20
Payer: MEDICARE

## 2025-03-20 VITALS
OXYGEN SATURATION: 98 % | WEIGHT: 226 LBS | BODY MASS INDEX: 30.61 KG/M2 | DIASTOLIC BLOOD PRESSURE: 75 MMHG | HEART RATE: 80 BPM | SYSTOLIC BLOOD PRESSURE: 118 MMHG | HEIGHT: 72 IN

## 2025-03-20 DIAGNOSIS — E78.2 MIXED HYPERLIPIDEMIA: ICD-10-CM

## 2025-03-20 DIAGNOSIS — E11.9 TYPE 2 DIABETES MELLITUS WITHOUT COMPLICATION, WITHOUT LONG-TERM CURRENT USE OF INSULIN: Primary | ICD-10-CM

## 2025-03-20 DIAGNOSIS — E11.9 TYPE 2 DIABETES MELLITUS WITHOUT COMPLICATION, WITHOUT LONG-TERM CURRENT USE OF INSULIN: ICD-10-CM

## 2025-03-20 DIAGNOSIS — I10 ESSENTIAL HYPERTENSION: ICD-10-CM

## 2025-03-20 DIAGNOSIS — I48.0 PAROXYSMAL ATRIAL FIBRILLATION (HCC): ICD-10-CM

## 2025-03-20 LAB
ALBUMIN SERPL-MCNC: 4.4 G/DL (ref 3.5–5.2)
ALP SERPL-CCNC: 69 U/L (ref 40–129)
ALT SERPL-CCNC: 18 U/L (ref 10–50)
ANION GAP SERPL CALCULATED.3IONS-SCNC: 11 MMOL/L (ref 8–16)
AST SERPL-CCNC: 21 U/L (ref 10–50)
BASOPHILS # BLD: 0 K/UL (ref 0–0.2)
BASOPHILS NFR BLD: 0.6 % (ref 0–1)
BILIRUB SERPL-MCNC: 1.1 MG/DL (ref 0.2–1.2)
BUN SERPL-MCNC: 17 MG/DL (ref 8–23)
CALCIUM SERPL-MCNC: 10.1 MG/DL (ref 8.8–10.2)
CHLORIDE SERPL-SCNC: 99 MMOL/L (ref 98–107)
CHOLEST SERPL-MCNC: 152 MG/DL (ref 0–199)
CO2 SERPL-SCNC: 30 MMOL/L (ref 22–29)
CREAT SERPL-MCNC: 1 MG/DL (ref 0.7–1.2)
EOSINOPHIL # BLD: 0.1 K/UL (ref 0–0.6)
EOSINOPHIL NFR BLD: 2 % (ref 0–5)
ERYTHROCYTE [DISTWIDTH] IN BLOOD BY AUTOMATED COUNT: 13.6 % (ref 11.5–14.5)
GLUCOSE SERPL-MCNC: 146 MG/DL (ref 70–99)
HBA1C MFR BLD: 6.8 % (ref 4–5.6)
HCT VFR BLD AUTO: 49.2 % (ref 42–52)
HDLC SERPL-MCNC: 50 MG/DL (ref 40–60)
HGB BLD-MCNC: 16.2 G/DL (ref 14–18)
IMM GRANULOCYTES # BLD: 0.1 K/UL
LDLC SERPL CALC-MCNC: 88 MG/DL
LYMPHOCYTES # BLD: 1.9 K/UL (ref 1.1–4.5)
LYMPHOCYTES NFR BLD: 26.7 % (ref 20–40)
MCH RBC QN AUTO: 32.9 PG (ref 27–31)
MCHC RBC AUTO-ENTMCNC: 32.9 G/DL (ref 33–37)
MCV RBC AUTO: 99.8 FL (ref 80–94)
MONOCYTES # BLD: 0.7 K/UL (ref 0–0.9)
MONOCYTES NFR BLD: 10 % (ref 0–10)
NEUTROPHILS # BLD: 4.2 K/UL (ref 1.5–7.5)
NEUTS SEG NFR BLD: 60 % (ref 50–65)
PLATELET # BLD AUTO: 303 K/UL (ref 130–400)
PMV BLD AUTO: 10.1 FL (ref 9.4–12.4)
POTASSIUM SERPL-SCNC: 4.4 MMOL/L (ref 3.5–5.1)
PROT SERPL-MCNC: 7.3 G/DL (ref 6.4–8.3)
RBC # BLD AUTO: 4.93 M/UL (ref 4.7–6.1)
SODIUM SERPL-SCNC: 140 MMOL/L (ref 136–145)
TRIGL SERPL-MCNC: 72 MG/DL (ref 0–149)
WBC # BLD AUTO: 6.9 K/UL (ref 4.8–10.8)

## 2025-03-20 PROCEDURE — 3074F SYST BP LT 130 MM HG: CPT | Performed by: INTERNAL MEDICINE

## 2025-03-20 PROCEDURE — 99204 OFFICE O/P NEW MOD 45 MIN: CPT | Performed by: INTERNAL MEDICINE

## 2025-03-20 PROCEDURE — 1123F ACP DISCUSS/DSCN MKR DOCD: CPT | Performed by: INTERNAL MEDICINE

## 2025-03-20 PROCEDURE — 1159F MED LIST DOCD IN RCRD: CPT | Performed by: INTERNAL MEDICINE

## 2025-03-20 PROCEDURE — 3078F DIAST BP <80 MM HG: CPT | Performed by: INTERNAL MEDICINE

## 2025-03-20 SDOH — ECONOMIC STABILITY: FOOD INSECURITY: WITHIN THE PAST 12 MONTHS, THE FOOD YOU BOUGHT JUST DIDN'T LAST AND YOU DIDN'T HAVE MONEY TO GET MORE.: NEVER TRUE

## 2025-03-20 SDOH — ECONOMIC STABILITY: FOOD INSECURITY: WITHIN THE PAST 12 MONTHS, YOU WORRIED THAT YOUR FOOD WOULD RUN OUT BEFORE YOU GOT MONEY TO BUY MORE.: NEVER TRUE

## 2025-03-20 ASSESSMENT — ENCOUNTER SYMPTOMS
TROUBLE SWALLOWING: 0
SORE THROAT: 0
EYE DISCHARGE: 0
BACK PAIN: 0
ABDOMINAL DISTENTION: 0
SINUS PRESSURE: 0
BLOOD IN STOOL: 0
ABDOMINAL PAIN: 0
VOMITING: 0
WHEEZING: 0
SHORTNESS OF BREATH: 0
NAUSEA: 0
EYE ITCHING: 0

## 2025-03-20 ASSESSMENT — PATIENT HEALTH QUESTIONNAIRE - PHQ9
SUM OF ALL RESPONSES TO PHQ QUESTIONS 1-9: 0
1. LITTLE INTEREST OR PLEASURE IN DOING THINGS: NOT AT ALL
2. FEELING DOWN, DEPRESSED OR HOPELESS: NOT AT ALL
SUM OF ALL RESPONSES TO PHQ QUESTIONS 1-9: 0

## 2025-03-20 NOTE — PROGRESS NOTES
[facility]  [facility] INTERNAL MEDICINE  [address] [place] [address] DRIVE  [address]  [place] [address]  Dept: [phone]  Dept Fax: [phone]  Loc: [phone]      Visit Date: 3/20/2025    Ken Jimenez a 74 y.o. male who presents today for:  Chief Complaint   Patient presents with    New Patient         HPI:     Here to establish new medical doctor.  [facility] so not sure what were going to add to his care and he sees cardiology at [facility]    Past Medical History:   Diagnosis Date    Alcohol abuse, in remission     Alcoholic in Remission since 1985    Arthritis     Atrial fibrillation (HCC)     Atrial flutter (HCC)     CAD (coronary artery disease)     Diabetes mellitus (HCC)     diet controlled    Ex-smoker     quit 2013 / smoked 50 yrs @ 1.75 PPD for 87.5 pack - years    GERD (gastroesophageal reflux disease)     History of amiodarone therapy     Hyperlipidemia     [facility]     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 6/27/13    Stroke ([facility]) 08/22/2017    eye      Past Surgical History:   Procedure Laterality Date    ABLATION OF DYSRHYTHMIC FOCUS N/A 2015    ABLATION OF DYSRHYTHMIC FOCUS  2016    CARDIAC CATHETERIZATION  6/27/13  [facility]    EF 45%    CARDIOVERSION      CATARACT REMOVAL WITH IMPLANT Bilateral     COLONOSCOPY N/A 06/16/2020    Dr KELSEY Olivares-SSA x 1, AP x 1, 5 yr recall    CORONARY ARTERY BYPASS GRAFT  06/27/2013    Emergency CABG x 4, LIMA-DIAG, SVG-LAD, SVG-OM, SVG-PDA, RT [facility], DR DOLAN    CYST INCISION AND DRAINAGE N/A     RECTAL    RETROPHYARYNGEAL ABCESS INCISION/DRAIN      Abcess near rectum       Family History   Problem Relation Age of Onset    No Known Problems Mother     Stroke Father     Heart Disease Father     High Blood Pressure Father     High Cholesterol Father     Other Father

## 2025-03-27 ENCOUNTER — HOSPITAL ENCOUNTER (OUTPATIENT)
Dept: CT IMAGING | Age: 75
Discharge: HOME | End: 2025-03-27
Attending: INTERNAL MEDICINE | Admitting: INTERNAL MEDICINE
Payer: MEDICARE

## 2025-03-27 DIAGNOSIS — I48.0 PAROXYSMAL ATRIAL FIBRILLATION (HCC): ICD-10-CM

## 2025-03-27 PROCEDURE — 75574 CT ANGIO HRT W/3D IMAGE: CPT

## 2025-03-27 PROCEDURE — 6360000004 HC RX CONTRAST MEDICATION: Performed by: INTERNAL MEDICINE

## 2025-03-27 RX ORDER — IOPAMIDOL 755 MG/ML
75 INJECTION, SOLUTION INTRAVASCULAR
Status: COMPLETED | OUTPATIENT
Start: 2025-03-27 | End: 2025-03-27

## 2025-03-27 RX ADMIN — IOPAMIDOL 75 ML: 755 INJECTION, SOLUTION INTRAVENOUS at 09:54

## 2025-03-30 ENCOUNTER — APPOINTMENT (OUTPATIENT)
Dept: CT IMAGING | Age: 75
End: 2025-03-30
Payer: MEDICARE

## 2025-03-30 ENCOUNTER — HOSPITAL ENCOUNTER (EMERGENCY)
Age: 75
Discharge: HOME | End: 2025-03-30
Attending: EMERGENCY MEDICINE
Payer: MEDICARE

## 2025-03-30 VITALS
HEART RATE: 71 BPM | WEIGHT: 226 LBS | SYSTOLIC BLOOD PRESSURE: 140 MMHG | DIASTOLIC BLOOD PRESSURE: 91 MMHG | TEMPERATURE: 97.6 F | BODY MASS INDEX: 30.65 KG/M2 | RESPIRATION RATE: 16 BRPM | OXYGEN SATURATION: 98 %

## 2025-03-30 DIAGNOSIS — H43.812 POSTERIOR VITREOUS DETACHMENT OF LEFT EYE: ICD-10-CM

## 2025-03-30 DIAGNOSIS — I48.20 CHRONIC ATRIAL FIBRILLATION (HCC): ICD-10-CM

## 2025-03-30 DIAGNOSIS — H53.132 ACUTE LOSS OF VISION, LEFT: Primary | ICD-10-CM

## 2025-03-30 DIAGNOSIS — Z79.01 ON CONTINUOUS ORAL ANTICOAGULATION: ICD-10-CM

## 2025-03-30 LAB
ALBUMIN SERPL-MCNC: 4.8 G/DL (ref 3.5–5.2)
ALP SERPL-CCNC: 68 U/L (ref 40–129)
ALT SERPL-CCNC: 11 U/L (ref 10–50)
ANION GAP SERPL CALCULATED.3IONS-SCNC: 13 MMOL/L (ref 8–16)
AST SERPL-CCNC: 19 U/L (ref 10–50)
BASOPHILS # BLD: 0.1 K/UL (ref 0–0.2)
BASOPHILS NFR BLD: 0.7 % (ref 0–1)
BILIRUB SERPL-MCNC: 1.1 MG/DL (ref 0.2–1.2)
BUN SERPL-MCNC: 16 MG/DL (ref 8–23)
CALCIUM SERPL-MCNC: 9.5 MG/DL (ref 8.8–10.2)
CHLORIDE SERPL-SCNC: 97 MMOL/L (ref 98–107)
CO2 SERPL-SCNC: 29 MMOL/L (ref 22–29)
CREAT SERPL-MCNC: 1.1 MG/DL (ref 0.7–1.2)
EOSINOPHIL # BLD: 0.1 K/UL (ref 0–0.6)
EOSINOPHIL NFR BLD: 1.7 % (ref 0–5)
ERYTHROCYTE [DISTWIDTH] IN BLOOD BY AUTOMATED COUNT: 13.2 % (ref 11.5–14.5)
GLUCOSE BLD-MCNC: 87 MG/DL (ref 70–99)
GLUCOSE SERPL-MCNC: 137 MG/DL (ref 70–99)
HCT VFR BLD AUTO: 49.6 % (ref 42–52)
HGB BLD-MCNC: 16.7 G/DL (ref 14–18)
IMM GRANULOCYTES # BLD: 0.1 K/UL
INR PPP: 1.18 (ref 0.88–1.18)
LYMPHOCYTES # BLD: 2.3 K/UL (ref 1.1–4.5)
LYMPHOCYTES NFR BLD: 33.2 % (ref 20–40)
MCH RBC QN AUTO: 33.3 PG (ref 27–31)
MCHC RBC AUTO-ENTMCNC: 33.7 G/DL (ref 33–37)
MCV RBC AUTO: 98.8 FL (ref 80–94)
MONOCYTES # BLD: 0.8 K/UL (ref 0–0.9)
MONOCYTES NFR BLD: 12.1 % (ref 0–10)
NEUTROPHILS # BLD: 3.6 K/UL (ref 1.5–7.5)
NEUTS SEG NFR BLD: 51.6 % (ref 50–65)
PERFORMED ON: NORMAL
PLATELET # BLD AUTO: 278 K/UL (ref 130–400)
PMV BLD AUTO: 9.4 FL (ref 9.4–12.4)
POTASSIUM SERPL-SCNC: 4 MMOL/L (ref 3.5–5.1)
PROT SERPL-MCNC: 8.2 G/DL (ref 6.4–8.3)
PROTHROMBIN TIME: 14.8 SEC (ref 12–14.6)
RBC # BLD AUTO: 5.02 M/UL (ref 4.7–6.1)
SODIUM SERPL-SCNC: 139 MMOL/L (ref 136–145)
WBC # BLD AUTO: 6.9 K/UL (ref 4.8–10.8)

## 2025-03-30 PROCEDURE — 80053 COMPREHEN METABOLIC PANEL: CPT

## 2025-03-30 PROCEDURE — 99285 EMERGENCY DEPT VISIT HI MDM: CPT

## 2025-03-30 PROCEDURE — 6360000004 HC RX CONTRAST MEDICATION: Performed by: EMERGENCY MEDICINE

## 2025-03-30 PROCEDURE — 82962 GLUCOSE BLOOD TEST: CPT

## 2025-03-30 PROCEDURE — 85025 COMPLETE CBC W/AUTO DIFF WBC: CPT

## 2025-03-30 PROCEDURE — 36415 COLL VENOUS BLD VENIPUNCTURE: CPT

## 2025-03-30 PROCEDURE — 85610 PROTHROMBIN TIME: CPT

## 2025-03-30 PROCEDURE — 70498 CT ANGIOGRAPHY NECK: CPT

## 2025-03-30 PROCEDURE — 70450 CT HEAD/BRAIN W/O DYE: CPT

## 2025-03-30 RX ORDER — IOPAMIDOL 755 MG/ML
70 INJECTION, SOLUTION INTRAVASCULAR
Status: COMPLETED | OUTPATIENT
Start: 2025-03-30 | End: 2025-03-30

## 2025-03-30 RX ADMIN — IOPAMIDOL 70 ML: 755 INJECTION, SOLUTION INTRAVENOUS at 18:46

## 2025-03-31 ENCOUNTER — TELEPHONE (OUTPATIENT)
Dept: CARDIOLOGY CLINIC | Age: 75
End: 2025-03-31

## 2025-03-31 ENCOUNTER — CLINICAL DOCUMENTATION (OUTPATIENT)
Dept: CARDIOLOGY | Age: 75
End: 2025-03-31

## 2025-03-31 DIAGNOSIS — I48.0 PAROXYSMAL ATRIAL FIBRILLATION (HCC): Primary | ICD-10-CM

## 2025-03-31 DIAGNOSIS — I48.0 PAF (PAROXYSMAL ATRIAL FIBRILLATION) (HCC): ICD-10-CM

## 2025-03-31 RX ORDER — WARFARIN SODIUM 5 MG/1
5 TABLET ORAL DAILY
Qty: 90 TABLET | Refills: 3 | Status: SHIPPED | OUTPATIENT
Start: 2025-03-31

## 2025-03-31 NOTE — TELEPHONE ENCOUNTER
[place] at the [facility] [place] ([facility]) located on the first floor of [facility]. Enter through hospital main entrance and turn immediately to your left.     Procedure Date: 04/09/25  Cath Lab will call with your arrival time Tuesday 04/08/25  (Times are subject to change)     Please have your lab work (CBC and BMP) done before your procedure Date.   Cardiac CT: 03/27/25  Lab work to be done morning of procedure    Procedure Instructions:   1) You will need to not have anything to eat or drink the morning before the procedure.   2) You will stay overnight for observation.  3) You can still take all of your morning medication with a sip of water   4) Do not take Eliquis or Sotalol the morning of your procedure   5) You will need a  for the procedure.         Went over the above instructions with patient and he voiced his understanding

## 2025-03-31 NOTE — PROGRESS NOTES
Cardiology progress note:    Received call from patient's ophthalmologist Dr. Harish Luna.  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  Concern for repeat embolic event despite anticoagulation.  CTA of his neck arteries shows patent middle cerebral arteries and anterior cerebral arteries as well as patent posterior cerebral arteries with atherosclerotic calcification in the right common and internal carotid arteries but no significant stenosis with patent vertebral arteries.  Options would be to switch aspirin to Plavix or switch Eliquis to Coumadin.  Would consider switching to Coumadin and will have patient come in to be seen by nurse practitioner in [place] Coumadin therapy.  Continue aspirin.

## 2025-04-04 ENCOUNTER — ANTI-COAG VISIT (OUTPATIENT)
Dept: CARDIOLOGY CLINIC | Age: 75
End: 2025-04-04

## 2025-04-04 DIAGNOSIS — I48.0 PAF (PAROXYSMAL ATRIAL FIBRILLATION) (HCC): Primary | ICD-10-CM

## 2025-04-04 LAB
INTERNATIONAL NORMALIZATION RATIO, POC: 1.2
PROTHROMBIN TIME, POC: 14.5

## 2025-04-04 NOTE — PROGRESS NOTES
Anticoagulation Summary  As of 2025      INR goal:  2.0-3.0   TTR:  --   INR used for dosin.2 (2025)   Warfarin maintenance plan:  5 mg (5 mg x 1) every day   Weekly warfarin total:  35 mg   Plan last modified:  Chacha Almanzar MA (2025)   Next INR check:  4/10/2025   Target end date:  --             Anticoagulation Episode Summary       INR check location:  --    Preferred lab:  --    Send INR reminders to:  [facility] CARDIOLOGY ASSOC PRACTICE STAFF    Comments:  --          Anticoagulation Care Providers       Provider Role Specialty Phone number    Mike Harvey MD Referring Interventional Cardiology [phone]                Dosing Plan  As of 2025      TTR:  --   Full warfarin instructions:  4/4: 10 mg; 4/5: 10 mg; 4/8: 10 mg; 4/10: 10 mg; Otherwise 5 mg every day               Made Ken Ventura aware of findings by phone.     Electronically signed by BERNARDO Lino on 25 at 12:49 PM CDT

## 2025-04-10 ENCOUNTER — ANTI-COAG VISIT (OUTPATIENT)
Dept: CARDIOLOGY CLINIC | Age: 75
End: 2025-04-10
Payer: MEDICARE

## 2025-04-10 DIAGNOSIS — I48.0 PAF (PAROXYSMAL ATRIAL FIBRILLATION) (HCC): Primary | ICD-10-CM

## 2025-04-10 DIAGNOSIS — Z79.01 LONG TERM (CURRENT) USE OF ANTICOAGULANTS: ICD-10-CM

## 2025-04-10 LAB
INTERNATIONAL NORMALIZATION RATIO, POC: 2.1
PROTHROMBIN TIME, POC: 23.2

## 2025-04-10 PROCEDURE — 93793 ANTICOAG MGMT PT WARFARIN: CPT | Performed by: CLINICAL NURSE SPECIALIST

## 2025-04-10 PROCEDURE — 36415 COLL VENOUS BLD VENIPUNCTURE: CPT | Performed by: CLINICAL NURSE SPECIALIST

## 2025-04-10 PROCEDURE — 85610 PROTHROMBIN TIME: CPT | Performed by: CLINICAL NURSE SPECIALIST

## 2025-04-10 NOTE — PROGRESS NOTES
Anticoagulation Summary  As of 4/10/2025      INR goal:  2.0-3.0   TTR:  --   INR used for dosin.1 (4/10/2025)   Warfarin maintenance plan:  10 mg (5 mg x 2) every Tue, Thu, Sat; 5 mg (5 mg x 1) all other days   Weekly warfarin total:  50 mg   Plan last modified:  Chacha Almanzar MA (4/10/2025)   Next INR check:  2025   Target end date:  --             Anticoagulation Episode Summary       INR check location:  --    Preferred lab:  --    Send INR reminders to:  [facility] CARDIOLOGY ASSOC PRACTICE STAFF    Comments:  --          Anticoagulation Care Providers       Provider Role Specialty Phone number    Mike Harvey MD Referring Interventional Cardiology [phone]                Dosing Plan  As of 4/10/2025      TTR:  --   Full warfarin instructions:  4/10: 10 mg; Otherwise 10 mg every Tue, Thu, Sat; 5 mg all other days               Made Ken Ventura aware of findings by phone.     Electronically signed by BERNARDO Lino on 4/10/25 at 9:30 AM CDT

## 2025-04-16 ENCOUNTER — TELEPHONE (OUTPATIENT)
Dept: GASTROENTEROLOGY | Age: 75
End: 2025-04-16

## 2025-04-16 NOTE — TELEPHONE ENCOUNTER
Ken received his [facility] recall letter for Dr Barry Olivares.     Please call him at [phone] to schedule    Thank you

## 2025-04-21 ENCOUNTER — TELEPHONE (OUTPATIENT)
Dept: CARDIOLOGY CLINIC | Age: 75
End: 2025-04-21

## 2025-04-21 NOTE — TELEPHONE ENCOUNTER
Date: 6/18/25    Cardiologist: Charlie    Procedure: Colonoscopy    Surgeon: Marshall    Last Office Visit: 3/18/25  Reason for office visit and medical concerns addressed at this office visit: afib, cardiomyopathy, cad, chf, htn, sss, dm, cva, retinal artery embolism    Testing Performed and Date of Service:  2/4/25 DCC    10/20/23 Antionette There is large inferior to inferolateral infarct with no significant  ischemia, with a calculated ejection fraction of 36 %.  Suggest: No significant change from Lexiscan study 8/19/2020.  Consideration for medical management if no significant symptomatology.  Signed by Dr Mike Harvey    Does the patient have a stent? If so, what type?  CABG 2013    Current Medications: warfarin, sotalol, ranexa, protonix, lisinopril, jardiance, imdur, lasix, eliquis, atorvastatin, aspirin    Is the patient currently taking an anticoagulant? If so, what is the diagnosis the patient has been given to warrant the need for the anticoagulant? Eliquis for afib, retinal artery embolism and awaiting ablation    Additional Notes: requesting to hold eliquis

## 2025-04-22 DIAGNOSIS — Z95.810 AICD (AUTOMATIC CARDIOVERTER/DEFIBRILLATOR) PRESENT: Primary | ICD-10-CM

## 2025-04-22 DIAGNOSIS — I48.0 PAF (PAROXYSMAL ATRIAL FIBRILLATION) (HCC): ICD-10-CM

## 2025-04-22 DIAGNOSIS — I25.5 ISCHEMIC CARDIOMYOPATHY: ICD-10-CM

## 2025-04-23 NOTE — TELEPHONE ENCOUNTER
Patient's colon is just a screening, will postpone until after current issues have resolved and patient has ablation.  GI notified

## 2025-04-25 ENCOUNTER — ANTI-COAG VISIT (OUTPATIENT)
Dept: CARDIOLOGY CLINIC | Age: 75
End: 2025-04-25
Payer: MEDICARE

## 2025-04-25 DIAGNOSIS — I48.0 PAF (PAROXYSMAL ATRIAL FIBRILLATION) (HCC): Primary | ICD-10-CM

## 2025-04-25 DIAGNOSIS — Z79.01 LONG TERM (CURRENT) USE OF ANTICOAGULANTS: ICD-10-CM

## 2025-04-25 LAB
INTERNATIONAL NORMALIZATION RATIO, POC: 2.5
PROTHROMBIN TIME, POC: 27.3

## 2025-04-25 PROCEDURE — 36415 COLL VENOUS BLD VENIPUNCTURE: CPT | Performed by: NURSE PRACTITIONER

## 2025-04-25 PROCEDURE — 93793 ANTICOAG MGMT PT WARFARIN: CPT | Performed by: NURSE PRACTITIONER

## 2025-04-25 PROCEDURE — 85610 PROTHROMBIN TIME: CPT | Performed by: NURSE PRACTITIONER

## 2025-04-25 NOTE — PROGRESS NOTES
Anticoagulation Summary  As of 2025      INR goal:  2.0-3.0   TTR:  100.0% (1.6 wk)   INR used for dosin.5 (2025)   Warfarin maintenance plan:  10 mg (5 mg x 2) every Tue, Thu, Sat; 5 mg (5 mg x 1) all other days   Weekly warfarin total:  50 mg   Plan last modified:  Chacha Almanzar MA (4/10/2025)   Next INR check:  2025   Target end date:  --             Anticoagulation Episode Summary       INR check location:  --    Preferred lab:  --    Send INR reminders to:  [facility] CARDIOLOGY ASSOC PRACTICE STAFF    Comments:  --          Anticoagulation Care Providers       Provider Role Specialty Phone number    Mike Harvey MD Referring Interventional Cardiology [phone]                Dosing Plan  As of 2025      TTR:  100.0% (1.6 wk)   Full warfarin instructions:  10 mg every Tue, Thu, Sat; 5 mg all other days               Made Ken Ventura aware of findings by phone.     Electronically signed by BERNARDO Johnson NP on 25 at 8:55 AM TERRIT

## 2025-05-05 ENCOUNTER — TELEPHONE (OUTPATIENT)
Dept: PHARMACY | Facility: CLINIC | Age: 75
End: 2025-05-05

## 2025-05-05 NOTE — TELEPHONE ENCOUNTER
Dr. Saul Mazariegos MD,      A heart failure care gap has been identified for this patient:   Ken Ventura is a 74 y.o. male with HFrEF but is not currently prescribed Triple GDMT, which have demonstrated reduced mortality and hospitalizations. Patient is currently taking Jardiance 25 mg daily and lisinopril 2.5 mg daily.   Would the patient benefit from the addition of spironolactone to meet triple GDMT?   If you agree, please send new prescription to patient's pharmacy.       If the patient is unable to take any of these agents, please respond back to me so the contraindication/intolerance can be documented in the medical chart.      Last visit: 3/18/25, Next visit: Not scheduled    See encounter note(s) below for complete details. Please let me know if you have any questions.      Thank you,  Arjun Rodriguez, PharmD  PGY-1 Pharmacy Resident  [facility]  [facility] Clinical [facility]  Department, toll free: [phone], option 1    =======================================================    [facility] CLINICAL PHARMAC[facility]: HEART FAILURE TREATMENT REVIEW  As part of [facility]'s efforts to reduce heart failure (HF) treatment gaps across the healthcare continuum, the organization is focused on improving alignment with guideline-directed medical therapy (GDMT) for heart failure with reduced ejection fraction (HFrEF).    TRIPLE GDMT GAP IDENTIFIED  Ken Ventura is an adult with a diagnosis of HF with a current or previous left ventricular ejection fraction (LVEF) of <=40% who is NOT prescribed THREE evidence-based medicines for HF as reflected in the Epic ambulatory medication list.     Allergies   Allergen Reactions    Amiodarone Rash    Histamine      States \"Bug Spray\" exposure gives him itching and burning skin    Azithromycin     Dofetilide      Other Reaction(s): black out spells    Penicillins Other (See Comments)     Don't know reaction, was a child. But

## 2025-05-06 ENCOUNTER — ANTI-COAG VISIT (OUTPATIENT)
Dept: CARDIOLOGY CLINIC | Age: 75
End: 2025-05-06

## 2025-05-22 ENCOUNTER — ANTI-COAG VISIT (OUTPATIENT)
Dept: CARDIOLOGY CLINIC | Age: 75
End: 2025-05-22
Payer: MEDICARE

## 2025-05-22 DIAGNOSIS — I48.0 PAF (PAROXYSMAL ATRIAL FIBRILLATION) (HCC): Primary | ICD-10-CM

## 2025-05-22 DIAGNOSIS — Z79.01 LONG TERM (CURRENT) USE OF ANTICOAGULANTS: ICD-10-CM

## 2025-05-22 LAB
INTERNATIONAL NORMALIZATION RATIO, POC: 2.5
PROTHROMBIN TIME, POC: 26.5

## 2025-05-22 PROCEDURE — 36415 COLL VENOUS BLD VENIPUNCTURE: CPT | Performed by: CLINICAL NURSE SPECIALIST

## 2025-05-22 PROCEDURE — 85610 PROTHROMBIN TIME: CPT | Performed by: CLINICAL NURSE SPECIALIST

## 2025-05-22 PROCEDURE — 93793 ANTICOAG MGMT PT WARFARIN: CPT | Performed by: CLINICAL NURSE SPECIALIST

## 2025-05-22 NOTE — PROGRESS NOTES
Anticoagulation Summary  As of 2025      INR goal:  2.0-3.0   TTR:  100.0% (1.3 mo)   INR used for dosin.5 (2025)   Warfarin maintenance plan:  10 mg (5 mg x 2) every Tue, Thu, Sat; 5 mg (5 mg x 1) all other days   Weekly warfarin total:  50 mg   Plan last modified:  Chacha Almanzar MA (4/10/2025)   Next INR check:  2025   Target end date:  --             Anticoagulation Episode Summary       INR check location:  --    Preferred lab:  --    Send INR reminders to:  [facility] CARDIOLOGY ASSOC PRACTICE STAFF    Comments:  --          Anticoagulation Care Providers       Provider Role Specialty Phone number    Mike Harvey MD Referring Interventional Cardiology [phone]                Dosing Plan  As of 2025      TTR:  100.0% (1.3 mo)   Full warfarin instructions:  10 mg every Tue, Thu, Sat; 5 mg all other days               Made Ken Ventura aware of findings by phone.     Electronically signed by BERNARDO Lino on 25 at 3:41 PM CDT

## 2025-05-27 ENCOUNTER — TELEPHONE (OUTPATIENT)
Dept: CARDIOLOGY CLINIC | Age: 75
End: 2025-05-27

## 2025-05-27 NOTE — TELEPHONE ENCOUNTER
Patient left message that he was returning a call from a text message and he's expecting a call about an ablation so he is hoping that is what the call was for.

## 2025-05-27 NOTE — TELEPHONE ENCOUNTER
Patient is 6 weeks in range INR on coumadin, can we proceed with scheduling PVI now? Patient is wanting to know.

## 2025-05-28 DIAGNOSIS — I48.0 PAF (PAROXYSMAL ATRIAL FIBRILLATION) (HCC): Primary | ICD-10-CM

## 2025-06-04 ENCOUNTER — ANESTHESIA EVENT (OUTPATIENT)
Age: 75
DRG: 274 | End: 2025-06-04
Payer: MEDICARE

## 2025-06-04 ENCOUNTER — HOSPITAL ENCOUNTER (INPATIENT)
Age: 75
LOS: 1 days | Discharge: HOME | DRG: 274 | End: 2025-06-06
Attending: INTERNAL MEDICINE | Admitting: INTERNAL MEDICINE
Payer: MEDICARE

## 2025-06-04 ENCOUNTER — ANESTHESIA (OUTPATIENT)
Age: 75
DRG: 274 | End: 2025-06-04
Payer: MEDICARE

## 2025-06-04 DIAGNOSIS — I48.0 PAROXYSMAL ATRIAL FIBRILLATION (HCC): ICD-10-CM

## 2025-06-04 LAB
ANION GAP SERPL CALCULATED.3IONS-SCNC: 10 MMOL/L (ref 8–16)
BUN SERPL-MCNC: 18 MG/DL (ref 8–23)
CALCIUM SERPL-MCNC: 10.2 MG/DL (ref 8.8–10.2)
CHLORIDE SERPL-SCNC: 102 MMOL/L (ref 98–107)
CO2 SERPL-SCNC: 25 MMOL/L (ref 22–29)
CREAT SERPL-MCNC: 1 MG/DL (ref 0.7–1.2)
ECHO BSA: 2.25 M2
EKG P AXIS: NORMAL DEGREES
EKG P-R INTERVAL: NORMAL MS
EKG Q-T INTERVAL: 382 MS
EKG QRS DURATION: 104 MS
EKG QTC CALCULATION (BAZETT): 399 MS
EKG T AXIS: -132 DEGREES
ERYTHROCYTE [DISTWIDTH] IN BLOOD BY AUTOMATED COUNT: 13.1 % (ref 11.5–14.5)
GLUCOSE SERPL-MCNC: 144 MG/DL (ref 70–99)
HCT VFR BLD AUTO: 47 % (ref 42–52)
HGB BLD-MCNC: 15.6 G/DL (ref 14–18)
INR PPP: 1.99 (ref 0.88–1.18)
MCH RBC QN AUTO: 32.6 PG (ref 27–31)
MCHC RBC AUTO-ENTMCNC: 33.2 G/DL (ref 33–37)
MCV RBC AUTO: 98.3 FL (ref 80–94)
PLATELET # BLD AUTO: 241 K/UL (ref 130–400)
PMV BLD AUTO: 9.6 FL (ref 9.4–12.4)
POTASSIUM SERPL-SCNC: 4.7 MMOL/L (ref 3.5–5.1)
PROTHROMBIN TIME: 22.3 SEC (ref 12–14.6)
RBC # BLD AUTO: 4.78 M/UL (ref 4.7–6.1)
SODIUM SERPL-SCNC: 137 MMOL/L (ref 136–145)
WBC # BLD AUTO: 7.2 K/UL (ref 4.8–10.8)

## 2025-06-04 PROCEDURE — C1733 CATH, EP, OTHR THAN COOL-TIP: HCPCS | Performed by: INTERNAL MEDICINE

## 2025-06-04 PROCEDURE — 93656 COMPRE EP EVAL ABLTJ ATR FIB: CPT | Performed by: INTERNAL MEDICINE

## 2025-06-04 PROCEDURE — 99222 1ST HOSP IP/OBS MODERATE 55: CPT | Performed by: INTERNAL MEDICINE

## 2025-06-04 PROCEDURE — 7100000001 HC PACU RECOVERY - ADDTL 15 MIN: Performed by: INTERNAL MEDICINE

## 2025-06-04 PROCEDURE — 99152 MOD SED SAME PHYS/QHP 5/>YRS: CPT | Performed by: INTERNAL MEDICINE

## 2025-06-04 PROCEDURE — 93657 TX L/R ATRIAL FIB ADDL: CPT | Performed by: INTERNAL MEDICINE

## 2025-06-04 PROCEDURE — 2500000003 HC RX 250 WO HCPCS

## 2025-06-04 PROCEDURE — C1894 INTRO/SHEATH, NON-LASER: HCPCS | Performed by: INTERNAL MEDICINE

## 2025-06-04 PROCEDURE — 80048 BASIC METABOLIC PNL TOTAL CA: CPT

## 2025-06-04 PROCEDURE — G0378 HOSPITAL OBSERVATION PER HR: HCPCS

## 2025-06-04 PROCEDURE — 99153 MOD SED SAME PHYS/QHP EA: CPT | Performed by: INTERNAL MEDICINE

## 2025-06-04 PROCEDURE — 2580000003 HC RX 258: Performed by: INTERNAL MEDICINE

## 2025-06-04 PROCEDURE — 93005 ELECTROCARDIOGRAM TRACING: CPT | Performed by: INTERNAL MEDICINE

## 2025-06-04 PROCEDURE — 6360000002 HC RX W HCPCS: Performed by: INTERNAL MEDICINE

## 2025-06-04 PROCEDURE — C1732 CATH, EP, DIAG/ABL, 3D/VECT: HCPCS | Performed by: INTERNAL MEDICINE

## 2025-06-04 PROCEDURE — C1759 CATH, INTRA ECHOCARDIOGRAPHY: HCPCS | Performed by: INTERNAL MEDICINE

## 2025-06-04 PROCEDURE — 03HY32Z INSERTION OF MONITORING DEVICE INTO UPPER ARTERY, PERCUTANEOUS APPROACH: ICD-10-PCS | Performed by: INTERNAL MEDICINE

## 2025-06-04 PROCEDURE — 85610 PROTHROMBIN TIME: CPT

## 2025-06-04 PROCEDURE — 2500000003 HC RX 250 WO HCPCS: Performed by: INTERNAL MEDICINE

## 2025-06-04 PROCEDURE — C1760 CLOSURE DEV, VASC: HCPCS | Performed by: INTERNAL MEDICINE

## 2025-06-04 PROCEDURE — C1769 GUIDE WIRE: HCPCS | Performed by: INTERNAL MEDICINE

## 2025-06-04 PROCEDURE — 2709999900 HC NON-CHARGEABLE SUPPLY: Performed by: INTERNAL MEDICINE

## 2025-06-04 PROCEDURE — 3700000001 HC ADD 15 MINUTES (ANESTHESIA): Performed by: INTERNAL MEDICINE

## 2025-06-04 PROCEDURE — C1766 INTRO/SHEATH,STRBLE,NON-PEEL: HCPCS | Performed by: INTERNAL MEDICINE

## 2025-06-04 PROCEDURE — 36415 COLL VENOUS BLD VENIPUNCTURE: CPT

## 2025-06-04 PROCEDURE — 6360000002 HC RX W HCPCS

## 2025-06-04 PROCEDURE — 2580000003 HC RX 258

## 2025-06-04 PROCEDURE — 6370000000 HC RX 637 (ALT 250 FOR IP): Performed by: INTERNAL MEDICINE

## 2025-06-04 PROCEDURE — 93010 ELECTROCARDIOGRAM REPORT: CPT | Performed by: INTERNAL MEDICINE

## 2025-06-04 PROCEDURE — 94760 N-INVAS EAR/PLS OXIMETRY 1: CPT

## 2025-06-04 PROCEDURE — 85027 COMPLETE CBC AUTOMATED: CPT

## 2025-06-04 PROCEDURE — 7100000000 HC PACU RECOVERY - FIRST 15 MIN: Performed by: INTERNAL MEDICINE

## 2025-06-04 PROCEDURE — 3700000000 HC ANESTHESIA ATTENDED CARE: Performed by: INTERNAL MEDICINE

## 2025-06-04 RX ORDER — LISINOPRIL 2.5 MG/1
2.5 TABLET ORAL DAILY
Status: DISCONTINUED | OUTPATIENT
Start: 2025-06-05 | End: 2025-06-06 | Stop reason: HOSPADM

## 2025-06-04 RX ORDER — PROPOFOL 10 MG/ML
INJECTION, EMULSION INTRAVENOUS
Status: DISCONTINUED | OUTPATIENT
Start: 2025-06-04 | End: 2025-06-04 | Stop reason: SDUPTHER

## 2025-06-04 RX ORDER — FENTANYL CITRATE 50 UG/ML
INJECTION, SOLUTION INTRAMUSCULAR; INTRAVENOUS
Status: DISCONTINUED | OUTPATIENT
Start: 2025-06-04 | End: 2025-06-04 | Stop reason: SDUPTHER

## 2025-06-04 RX ORDER — NITROGLYCERIN 0.4 MG/1
0.4 TABLET SUBLINGUAL EVERY 5 MIN PRN
Status: DISCONTINUED | OUTPATIENT
Start: 2025-06-04 | End: 2025-06-06 | Stop reason: HOSPADM

## 2025-06-04 RX ORDER — SODIUM CHLORIDE 0.9 % (FLUSH) 0.9 %
5-40 SYRINGE (ML) INJECTION EVERY 12 HOURS SCHEDULED
Status: DISCONTINUED | OUTPATIENT
Start: 2025-06-04 | End: 2025-06-06 | Stop reason: HOSPADM

## 2025-06-04 RX ORDER — ONDANSETRON 2 MG/ML
INJECTION INTRAMUSCULAR; INTRAVENOUS
Status: DISCONTINUED | OUTPATIENT
Start: 2025-06-04 | End: 2025-06-04 | Stop reason: SDUPTHER

## 2025-06-04 RX ORDER — ROCURONIUM BROMIDE 10 MG/ML
INJECTION, SOLUTION INTRAVENOUS
Status: DISCONTINUED | OUTPATIENT
Start: 2025-06-04 | End: 2025-06-04 | Stop reason: SDUPTHER

## 2025-06-04 RX ORDER — ISOSORBIDE MONONITRATE 30 MG/1
60 TABLET, EXTENDED RELEASE ORAL DAILY
Status: DISCONTINUED | OUTPATIENT
Start: 2025-06-05 | End: 2025-06-06 | Stop reason: HOSPADM

## 2025-06-04 RX ORDER — VITAMIN B COMPLEX
2000 TABLET ORAL DAILY
Status: DISCONTINUED | OUTPATIENT
Start: 2025-06-04 | End: 2025-06-06 | Stop reason: HOSPADM

## 2025-06-04 RX ORDER — FUROSEMIDE 80 MG/1
80 TABLET ORAL DAILY
Status: DISCONTINUED | OUTPATIENT
Start: 2025-06-04 | End: 2025-06-06 | Stop reason: HOSPADM

## 2025-06-04 RX ORDER — RANOLAZINE 500 MG/1
1000 TABLET, EXTENDED RELEASE ORAL 2 TIMES DAILY
Status: DISCONTINUED | OUTPATIENT
Start: 2025-06-04 | End: 2025-06-06 | Stop reason: HOSPADM

## 2025-06-04 RX ORDER — GLYCOPYRROLATE 0.2 MG/ML
INJECTION INTRAMUSCULAR; INTRAVENOUS
Status: DISCONTINUED | OUTPATIENT
Start: 2025-06-04 | End: 2025-06-04 | Stop reason: SDUPTHER

## 2025-06-04 RX ORDER — PROTAMINE SULFATE 10 MG/ML
INJECTION, SOLUTION INTRAVENOUS PRN
Status: DISCONTINUED | OUTPATIENT
Start: 2025-06-04 | End: 2025-06-04 | Stop reason: HOSPADM

## 2025-06-04 RX ORDER — SODIUM CHLORIDE 0.9 % (FLUSH) 0.9 %
5-40 SYRINGE (ML) INJECTION PRN
Status: DISCONTINUED | OUTPATIENT
Start: 2025-06-04 | End: 2025-06-06 | Stop reason: HOSPADM

## 2025-06-04 RX ORDER — HEPARIN SODIUM 1000 [USP'U]/ML
INJECTION, SOLUTION INTRAVENOUS; SUBCUTANEOUS PRN
Status: DISCONTINUED | OUTPATIENT
Start: 2025-06-04 | End: 2025-06-04 | Stop reason: HOSPADM

## 2025-06-04 RX ORDER — SODIUM CHLORIDE 9 MG/ML
INJECTION, SOLUTION INTRAVENOUS PRN
Status: DISCONTINUED | OUTPATIENT
Start: 2025-06-04 | End: 2025-06-04 | Stop reason: HOSPADM

## 2025-06-04 RX ORDER — PANTOPRAZOLE SODIUM 40 MG/1
40 TABLET, DELAYED RELEASE ORAL DAILY
Status: DISCONTINUED | OUTPATIENT
Start: 2025-06-04 | End: 2025-06-06 | Stop reason: HOSPADM

## 2025-06-04 RX ORDER — ASPIRIN 81 MG/1
81 TABLET ORAL DAILY
Status: DISCONTINUED | OUTPATIENT
Start: 2025-06-05 | End: 2025-06-06 | Stop reason: HOSPADM

## 2025-06-04 RX ORDER — LIDOCAINE HYDROCHLORIDE 10 MG/ML
INJECTION, SOLUTION EPIDURAL; INFILTRATION; INTRACAUDAL; PERINEURAL
Status: DISCONTINUED | OUTPATIENT
Start: 2025-06-04 | End: 2025-06-04 | Stop reason: SDUPTHER

## 2025-06-04 RX ORDER — ATORVASTATIN CALCIUM 80 MG/1
80 TABLET, FILM COATED ORAL NIGHTLY
Status: DISCONTINUED | OUTPATIENT
Start: 2025-06-04 | End: 2025-06-06 | Stop reason: HOSPADM

## 2025-06-04 RX ORDER — SOTALOL HYDROCHLORIDE 80 MG/1
80 TABLET ORAL 2 TIMES DAILY
Status: DISCONTINUED | OUTPATIENT
Start: 2025-06-04 | End: 2025-06-06 | Stop reason: HOSPADM

## 2025-06-04 RX ORDER — SODIUM CHLORIDE 9 MG/ML
INJECTION, SOLUTION INTRAVENOUS CONTINUOUS
Status: DISCONTINUED | OUTPATIENT
Start: 2025-06-04 | End: 2025-06-04 | Stop reason: HOSPADM

## 2025-06-04 RX ORDER — SODIUM CHLORIDE 0.9 % (FLUSH) 0.9 %
5-40 SYRINGE (ML) INJECTION EVERY 12 HOURS SCHEDULED
Status: DISCONTINUED | OUTPATIENT
Start: 2025-06-04 | End: 2025-06-04 | Stop reason: HOSPADM

## 2025-06-04 RX ORDER — SODIUM CHLORIDE 0.9 % (FLUSH) 0.9 %
5-40 SYRINGE (ML) INJECTION PRN
Status: DISCONTINUED | OUTPATIENT
Start: 2025-06-04 | End: 2025-06-04 | Stop reason: HOSPADM

## 2025-06-04 RX ORDER — EPHEDRINE SULFATE/0.9% NACL/PF 25 MG/5 ML
SYRINGE (ML) INTRAVENOUS
Status: DISCONTINUED | OUTPATIENT
Start: 2025-06-04 | End: 2025-06-04 | Stop reason: SDUPTHER

## 2025-06-04 RX ADMIN — SOTALOL HYDROCHLORIDE 80 MG: 80 TABLET ORAL at 19:59

## 2025-06-04 RX ADMIN — SODIUM CHLORIDE: 0.9 INJECTION, SOLUTION INTRAVENOUS at 07:07

## 2025-06-04 RX ADMIN — RANOLAZINE 1000 MG: 500 TABLET, FILM COATED, EXTENDED RELEASE ORAL at 19:59

## 2025-06-04 RX ADMIN — ONDANSETRON 4 MG: 2 INJECTION INTRAMUSCULAR; INTRAVENOUS at 10:12

## 2025-06-04 RX ADMIN — EPHEDRINE SULFATE 5 MG: 5 INJECTION INTRAVENOUS at 08:39

## 2025-06-04 RX ADMIN — EPHEDRINE SULFATE 10 MG: 5 INJECTION INTRAVENOUS at 08:40

## 2025-06-04 RX ADMIN — EPHEDRINE SULFATE 10 MG: 5 INJECTION INTRAVENOUS at 08:42

## 2025-06-04 RX ADMIN — SODIUM CHLORIDE, PRESERVATIVE FREE 10 ML: 5 INJECTION INTRAVENOUS at 19:59

## 2025-06-04 RX ADMIN — ROCURONIUM BROMIDE 40 MG: 10 INJECTION, SOLUTION INTRAVENOUS at 08:35

## 2025-06-04 RX ADMIN — ROCURONIUM BROMIDE 10 MG: 10 INJECTION, SOLUTION INTRAVENOUS at 09:40

## 2025-06-04 RX ADMIN — PHENYLEPHRINE HYDROCHLORIDE 150 MCG: 10 INJECTION INTRAVENOUS at 08:44

## 2025-06-04 RX ADMIN — GLYCOPYRROLATE 0.3 MG: 0.2 INJECTION, SOLUTION INTRAMUSCULAR; INTRAVENOUS at 09:18

## 2025-06-04 RX ADMIN — SODIUM CHLORIDE: 9 INJECTION, SOLUTION INTRAVENOUS at 08:22

## 2025-06-04 RX ADMIN — SUGAMMADEX 200 MG: 100 INJECTION, SOLUTION INTRAVENOUS at 10:12

## 2025-06-04 RX ADMIN — FENTANYL CITRATE 100 MCG: 50 INJECTION, SOLUTION INTRAMUSCULAR; INTRAVENOUS at 08:22

## 2025-06-04 RX ADMIN — PHENYLEPHRINE HYDROCHLORIDE 20 MCG/MIN: 10 INJECTION INTRAVENOUS at 08:56

## 2025-06-04 RX ADMIN — PROPOFOL 120 MG: 10 INJECTION, EMULSION INTRAVENOUS at 08:35

## 2025-06-04 RX ADMIN — ATORVASTATIN CALCIUM 80 MG: 80 TABLET, FILM COATED ORAL at 19:59

## 2025-06-04 RX ADMIN — LIDOCAINE HYDROCHLORIDE 50 MG: 10 INJECTION, SOLUTION EPIDURAL; INFILTRATION; INTRACAUDAL; PERINEURAL at 08:35

## 2025-06-04 NOTE — CARE COORDINATION
06/04/25 1240   IMM Letter   Observation Status Letter date given: 06/04/25   Observation Status Letter time given: 1225   Observation Status Letter given to Patient/Family/Significant other/Guardian/POA/by: HIMANSHU Davila     BARRON letter given to patient/family with oral explanation and questions addressed by:     Signed letter placed in pt chart.   Electronically signed by HIMANSHU Rider on 6/4/2025 at 12:41 PM

## 2025-06-04 NOTE — ANESTHESIA POSTPROCEDURE EVALUATION
Department of Anesthesiology  Postprocedure Note    Patient: Ken Ventura  MRN: [id number]  YOB: 1950  Date of evaluation: 6/4/2025    Procedure Summary       Date: 06/04/25 Room / Location: [facility] CATH/HYBRID LAB / [facility] CARDIAC CATH LAB    Anesthesia Start: 0822 Anesthesia Stop:     Procedure: Ablation A-fib w complete ep study Diagnosis:       Paroxysmal atrial fibrillation (HCC)      (Paroxysmal atrial fibrillation (HCC) [I48.0])    Providers: Saul Mazariegos MD Responsible Provider: Harrison Alarcon APRN - CRNA    Anesthesia Type: General ASA Status: 3            Anesthesia Type: General    Sanam Phase I: Sanam Score: 10    Sanam Phase II:      Anesthesia Post Evaluation    Patient location during evaluation: PACU  Patient participation: complete - patient participated  Level of consciousness: awake and alert  Pain score: 0  Airway patency: patent  Nausea & Vomiting: no vomiting and no nausea  Cardiovascular status: blood pressure returned to baseline and hemodynamically stable  Respiratory status: spontaneous ventilation, room air, nonlabored ventilation and acceptable  Hydration status: euvolemic  Comments: /73   Pulse 76   Temp 97.1 °F (36.2 °C) (Temporal)   Resp 15   Ht 1.829 m (6')   Wt 99.8 kg (220 lb)   SpO2 97%   BMI 29.84 kg/m²     Pain management: adequate    No notable events documented.

## 2025-06-04 NOTE — PLAN OF CARE
Problem: Pain  Goal: Verbalizes/displays adequate comfort level or baseline comfort level  Outcome: Progressing     Problem: Skin/Tissue Integrity  Goal: Skin integrity remains intact  Description: 1.  Monitor for areas of redness and/or skin breakdown2.  Assess vascular access sites hourly3.  Every 4-6 hours minimum:  Change oxygen saturation probe site4.  Every 4-6 hours:  If on nasal continuous positive airway pressure, respiratory therapy assess nares and determine need for appliance change or resting period  Outcome: Progressing     Problem: Chronic Conditions and Co-morbidities  Goal: Patient's chronic conditions and co-morbidity symptoms are monitored and maintained or improved  Outcome: Progressing     Problem: Discharge Planning  Goal: Discharge to home or other facility with appropriate resources  Outcome: Progressing     Problem: ABCDS Injury Assessment  Goal: Absence of physical injury  Outcome: Progressing

## 2025-06-04 NOTE — ANESTHESIA PRE PROCEDURE
Department of Anesthesiology  Preprocedure Note       Name:  Ken Ventura   Age:  74 y.o.  :  1950                                          MRN:  [id number]         Date:  2025      Surgeon: Surgeon(s):  Saul Mazariegos MD    Procedure: Procedure(s):  Ablation A-fib w complete ep study    Medications prior to admission:   Prior to Admission medications    Medication Sig Start Date End Date Taking? Authorizing Provider   warfarin (COUMADIN) 5 MG tablet Take 1 tablet by mouth daily  Patient taking differently: Take 1 tablet by mouth daily Takes 2 tablets on Tuesday, thursday and saturday 3/31/25  Yes Carlos Jimenez APRN   pantoprazole (PROTONIX) 40 MG tablet Take 1 tablet by mouth daily   Yes ProviderLigia MD   sotalol (BETAPACE) 80 MG tablet Take 1 tablet by mouth 2 times daily 10/5/23  Yes Mike Harvey MD   JARDIANCE 25 MG tablet Take 1 tablet by mouth daily 23  Yes Provider, MD Ligia   isosorbide mononitrate (IMDUR) 60 MG extended release tablet Take (1) tab AM and 1-1/2 tab PM 20  Yes Raissa Go APRN   lisinopril (PRINIVIL;ZESTRIL) 2.5 MG tablet Take 1 tablet by mouth daily 10/12/19  Yes Mikey Day MD   ranolazine (RANEXA) 1000 MG extended release tablet Take 1 tablet by mouth 2 times daily 6/10/19  Yes Anabel Nunez APRN   potassium chloride (KLOR-CON) 20 MEQ packet Take 1/2 tablet daily   Yes ProviderLigia MD   atorvastatin (LIPITOR) 80 MG tablet Take 1 tablet by mouth nightly 18  Yes Anabel Nunez APRN   furosemide (LASIX) 80 MG tablet Take 1 tablet by mouth daily 6/15/17  Yes Sary Norman APRN   aspirin 81 MG tablet Take 1 tablet by mouth daily   Yes ProviderLigia MD   Cholecalciferol (VITAMIN D) 2000 UNITS CAPS capsule daily   Yes Ligia Lamar MD   nitroGLYCERIN (NITROSTAT) 0.4 MG SL tablet up to max of 3 total doses. If no relief after 1 dose, call 911. 18   Mikey Day MD       Current medications:    Current

## 2025-06-04 NOTE — PROGRESS NOTES
4 Eyes Skin Assessment     NAME:  Ken Ventura  YOB: 1950  MEDICAL RECORD NUMBER:  [id number]    The patient is being assessed for  Admission    I agree that at least one RN has performed a thorough Head to Toe Skin Assessment on the patient. ALL assessment sites listed below have been assessed.      Areas assessed by both nurses:    Head, Face, Ears, Shoulders, Back, Chest, Arms, Elbows, Hands, Sacrum. Buttock, Coccyx, Ischium, and Legs. Feet and Heels        Does the Patient have a Wound? No noted wound(s)       Doc Prevention initiated by RN: No  Wound Care Orders initiated by RN: No    Pressure Injury (Stage 3,4, Unstageable, DTI, NWPT, and Complex wounds) if present, place Wound referral order by RN under : No    New Ostomies, if present place, Ostomy referral order under : No     Nurse 1 eSignature: Electronically signed by RIKY OH RN on 6/4/25 at 12:36 PM CDT    **SHARE this note so that the co-signing nurse can place an eSignature**    Nurse 2 eSignature: Electronically signed by April Arora RN on 6/4/25 at 3:08 PM CDT

## 2025-06-04 NOTE — ANESTHESIA PROCEDURE NOTES
Arterial Line:    An arterial line was placed using ultrasound guidance, in the pre-op for the following indication(s): continuous blood pressure monitoring and blood sampling needed.    A 20 gauge (size), 4.45 cm (length), Arrow (type) catheter was placed, Seldinger technique used, into the left radial artery, secured by tape and Tegaderm.  Anesthesia type: Local  Local infiltration: Injection    Events:  patient tolerated procedure well with no complications.6/4/2025 7:45 AM6/4/2025 7:50 AM  Resident/CRNA: Harrison Alarcon APRN - CRNA  Performed: Resident/CRNA   Preanesthetic Checklist  Completed: patient identified, IV checked, site marked, risks and benefits discussed, surgical/procedural consents, equipment checked, pre-op evaluation, timeout performed, anesthesia consent given, oxygen available, monitors applied/VS acknowledged, fire risk safety assessment completed and verbalized and blood product R/B/A discussed and consented

## 2025-06-04 NOTE — H&P
[facility] Cardiology  [address] [address]  [phone]        Ken Ventura is a 74 y.o. male presents with atrial fibrillation.  He had an ablation about 8 years ago and had a recurrence of A-fib when he was treated with steroids for a sinus infection in the past month.  He underwent cardioversion and the A-fib recurred about 8 days later.  He feels fatigued and has not been having any tachycardia.      Review of Systems   Constitutional: Negative for fever, chills, diaphoresis, activity change, appetite change, fatigue and unexpected weight change.   Eyes: Negative for photophobia, pain, redness and visual disturbance.   Respiratory: Negative for apnea, cough, chest tightness, shortness of breath, wheezing and stridor.    Cardiovascular: Negative for chest pain, palpitations and leg swelling.   Gastrointestinal: Negative for abdominal distention.   Genitourinary: Negative for dysuria, urgency and frequency.   Musculoskeletal: Negative for myalgias, arthralgias and gait problem.   Skin: Negative for color change, pallor, rash and wound.   Neurological: Negative for dizziness, tremors, speech difficulty, weakness and numbness.   Hematological: Does not bruise/bleed easily.   Psychiatric/Behavioral: Negative.      Past Medical History:   Diagnosis Date    Alcohol abuse, in remission     Alcoholic in Remission since 1985    Arthritis     Atrial fibrillation (HCC)     Atrial flutter (HCC)     CAD (coronary artery disease)     Diabetes mellitus (HCC)     diet controlled    Ex-smoker     quit 2013 / smoked 50 yrs @ 1.75 PPD for 87.5 pack - years    GERD (gastroesophageal reflux disease)     History of amiodarone therapy     Hyperlipidemia     [facility]     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

## 2025-06-05 PROCEDURE — 2500000003 HC RX 250 WO HCPCS: Performed by: INTERNAL MEDICINE

## 2025-06-05 PROCEDURE — G0378 HOSPITAL OBSERVATION PER HR: HCPCS

## 2025-06-05 PROCEDURE — 6370000000 HC RX 637 (ALT 250 FOR IP): Performed by: INTERNAL MEDICINE

## 2025-06-05 PROCEDURE — 94760 N-INVAS EAR/PLS OXIMETRY 1: CPT

## 2025-06-05 PROCEDURE — 2060000000 HC ICU INTERMEDIATE R&B

## 2025-06-05 RX ADMIN — ASPIRIN 81 MG: 81 TABLET, COATED ORAL at 10:11

## 2025-06-05 RX ADMIN — EMPAGLIFLOZIN 25 MG: 10 TABLET, FILM COATED ORAL at 10:12

## 2025-06-05 RX ADMIN — SODIUM CHLORIDE, PRESERVATIVE FREE 10 ML: 5 INJECTION INTRAVENOUS at 21:53

## 2025-06-05 RX ADMIN — SOTALOL HYDROCHLORIDE 80 MG: 80 TABLET ORAL at 21:53

## 2025-06-05 RX ADMIN — RANOLAZINE 1000 MG: 500 TABLET, FILM COATED, EXTENDED RELEASE ORAL at 10:11

## 2025-06-05 RX ADMIN — RANOLAZINE 1000 MG: 500 TABLET, FILM COATED, EXTENDED RELEASE ORAL at 21:53

## 2025-06-05 RX ADMIN — ATORVASTATIN CALCIUM 80 MG: 80 TABLET, FILM COATED ORAL at 21:53

## 2025-06-05 RX ADMIN — Medication 2000 UNITS: at 10:13

## 2025-06-05 RX ADMIN — ISOSORBIDE MONONITRATE 60 MG: 30 TABLET, EXTENDED RELEASE ORAL at 10:11

## 2025-06-05 RX ADMIN — LISINOPRIL 2.5 MG: 2.5 TABLET ORAL at 10:12

## 2025-06-05 RX ADMIN — SODIUM CHLORIDE, PRESERVATIVE FREE 10 ML: 5 INJECTION INTRAVENOUS at 10:19

## 2025-06-05 RX ADMIN — SOTALOL HYDROCHLORIDE 80 MG: 80 TABLET ORAL at 10:13

## 2025-06-05 NOTE — PLAN OF CARE
Problem: Pain  Goal: Verbalizes/displays adequate comfort level or baseline comfort level  6/4/2025 2147 by Amisha Tam RN  Outcome: Progressing  6/4/2025 1227 by Alma Diaz RN  Outcome: Progressing     Problem: Skin/Tissue Integrity  Goal: Skin integrity remains intact  Description: 1.  Monitor for areas of redness and/or skin breakdown2.  Assess vascular access sites hourly3.  Every 4-6 hours minimum:  Change oxygen saturation probe site4.  Every 4-6 hours:  If on nasal continuous positive airway pressure, respiratory therapy assess nares and determine need for appliance change or resting period  6/4/2025 2147 by Amisha Tam RN  Outcome: Progressing  6/4/2025 1227 by Alma Diaz RN  Outcome: Progressing     Problem: Chronic Conditions and Co-morbidities  Goal: Patient's chronic conditions and co-morbidity symptoms are monitored and maintained or improved  6/4/2025 2147 by Amisha Tam RN  Outcome: Progressing  6/4/2025 1227 by Alma Diaz RN  Outcome: Progressing     Problem: Discharge Planning  Goal: Discharge to home or other facility with appropriate resources  6/4/2025 2147 by Amisha Tam RN  Outcome: Progressing  6/4/2025 1227 by Alma Diaz RN  Outcome: Progressing     Problem: ABCDS Injury Assessment  Goal: Absence of physical injury  6/4/2025 2147 by Amisha Tam RN  Outcome: Progressing  6/4/2025 1227 by Alma Diaz RN  Outcome: Progressing     Problem: Safety - Adult  Goal: Free from fall injury  Outcome: Progressing

## 2025-06-05 NOTE — CARE COORDINATION
06/05/25 1655   IMM Letter   IMM Letter given to Patient/Family/Significant other/Guardian/POA/by: Trinh Rodriguez   IMM Letter date given: 06/05/25   IMM Letter time given: 1615     Important Message from Medicare letter given to  Ken Ventura  by Trinh Rodriguez. All questions answered,  Ken Ventura  voiced understanding. Signed copy of IMM placed in patient's soft chart. Ken Ventura given a copy of the IMM.     Pt upgraded from OBS to IP

## 2025-06-06 VITALS
BODY MASS INDEX: 30.94 KG/M2 | OXYGEN SATURATION: 99 % | RESPIRATION RATE: 16 BRPM | WEIGHT: 228.4 LBS | HEART RATE: 68 BPM | HEIGHT: 72 IN | SYSTOLIC BLOOD PRESSURE: 102 MMHG | TEMPERATURE: 97.2 F | DIASTOLIC BLOOD PRESSURE: 67 MMHG

## 2025-06-06 PROCEDURE — 94760 N-INVAS EAR/PLS OXIMETRY 1: CPT

## 2025-06-06 PROCEDURE — 02583ZZ DESTRUCTION OF CONDUCTION MECHANISM, PERCUTANEOUS APPROACH: ICD-10-PCS | Performed by: INTERNAL MEDICINE

## 2025-06-06 PROCEDURE — 4A023FZ MEASUREMENT OF CARDIAC RHYTHM, PERCUTANEOUS APPROACH: ICD-10-PCS | Performed by: INTERNAL MEDICINE

## 2025-06-06 PROCEDURE — G0378 HOSPITAL OBSERVATION PER HR: HCPCS

## 2025-06-06 PROCEDURE — 6370000000 HC RX 637 (ALT 250 FOR IP): Performed by: INTERNAL MEDICINE

## 2025-06-06 PROCEDURE — 99238 HOSP IP/OBS DSCHRG MGMT 30/<: CPT | Performed by: INTERNAL MEDICINE

## 2025-06-06 RX ADMIN — EMPAGLIFLOZIN 25 MG: 10 TABLET, FILM COATED ORAL at 09:45

## 2025-06-06 RX ADMIN — ASPIRIN 81 MG: 81 TABLET, COATED ORAL at 09:45

## 2025-06-06 RX ADMIN — RANOLAZINE 1000 MG: 500 TABLET, FILM COATED, EXTENDED RELEASE ORAL at 09:45

## 2025-06-06 RX ADMIN — SOTALOL HYDROCHLORIDE 80 MG: 80 TABLET ORAL at 09:45

## 2025-06-06 RX ADMIN — Medication 2000 UNITS: at 09:45

## 2025-06-06 RX ADMIN — LISINOPRIL 2.5 MG: 2.5 TABLET ORAL at 09:45

## 2025-06-06 RX ADMIN — ISOSORBIDE MONONITRATE 60 MG: 30 TABLET, EXTENDED RELEASE ORAL at 09:45

## 2025-06-06 NOTE — DISCHARGE SUMMARY
Discharge Summary    Ken Ventura  :  1950  MRN:  [id number]    Admit date:  2025  Discharge date:      Admitting Physician:  Saul Mazariegos MD    Advance Directive: Full Code    Consults: none    Primary Care Physician:  Reddy Randhawa MD    Discharge Diagnoses:  Principal Problem:    Paroxysmal atrial fibrillation (HCC)  Active Problems:    PAF (paroxysmal atrial fibrillation) (HCC)  Resolved Problems:    * No resolved hospital problems. *      Cardiology Specific Data:  Specialty Problems          Cardiology Problems    Ischemic cardiomyopathy        Coronary artery disease involving native coronary artery of native heart        Mixed hyperlipidemia        Chest pain        * (Principal) Paroxysmal atrial fibrillation (HCC)        Chronic combined systolic and diastolic heart failure (HCC)        Sick sinus syndrome (HCC)        Amaurosis fugax        Essential hypertension        PAC (premature atrial contraction)        PSVT (paroxysmal supraventricular tachycardia)        PAF (paroxysmal atrial fibrillation) (HCC)           Significant Diagnostic Studies:   Electrophysiology procedure  Result Date: 2025    Successful pulmonary vein isolation of all 4 pulmonary veins as well as posterior lines drawn and focal anterior ablation where abnormal electrograms were seen on the right side.       Pertinent Labs:   CBC:   Recent Labs     25  0640   WBC 7.2   HGB 15.6        BMP:    Recent Labs     25  0640      K 4.7      CO2 25   BUN 18   CREATININE 1.0   GLUCOSE 144*     INR:   Recent Labs     25  0640   INR 1.99*     Lipids: No results for input(s): \"CHOL\", \"HDL\" in the last 72 hours.    Invalid input(s): \"LDLCALCU\"  ABGs:No results for input(s): \"PHART\", \"JJR6OHO\", \"PO2ART\", \"DVC7HQB\", \"BEART\", \"HGBAE\", \"F7XSOOJK\", \"CARBOXHGBART\", \"02THERAPY\" in the last 72 hours.  HgBA1c:  No results for input(s): \"LABA1C\" in the last 72 hours.    Procedures:  Ablation

## 2025-06-06 NOTE — PLAN OF CARE
Problem: Pain  Goal: Verbalizes/displays adequate comfort level or baseline comfort level  Outcome: Progressing     Problem: Skin/Tissue Integrity  Goal: Skin integrity remains intact  Outcome: Progressing     Problem: Chronic Conditions and Co-morbidities  Goal: Patient's chronic conditions and co-morbidity symptoms are monitored and maintained or improved  Outcome: Progressing     Problem: Discharge Planning  Goal: Discharge to home or other facility with appropriate resources  Outcome: Progressing     Problem: ABCDS Injury Assessment  Goal: Absence of physical injury  Outcome: Progressing     Problem: Safety - Adult  Goal: Free from fall injury  Outcome: Progressing

## 2025-06-09 ENCOUNTER — ANTI-COAG VISIT (OUTPATIENT)
Dept: CARDIOLOGY CLINIC | Age: 75
End: 2025-06-09
Payer: MEDICARE

## 2025-06-09 DIAGNOSIS — Z79.01 LONG TERM (CURRENT) USE OF ANTICOAGULANTS: ICD-10-CM

## 2025-06-09 DIAGNOSIS — I48.0 PAF (PAROXYSMAL ATRIAL FIBRILLATION) (HCC): Primary | ICD-10-CM

## 2025-06-09 LAB
INTERNATIONAL NORMALIZATION RATIO, POC: 1.3
PROTHROMBIN TIME, POC: 15.3

## 2025-06-09 PROCEDURE — 85610 PROTHROMBIN TIME: CPT | Performed by: NURSE PRACTITIONER

## 2025-06-09 PROCEDURE — 93793 ANTICOAG MGMT PT WARFARIN: CPT | Performed by: NURSE PRACTITIONER

## 2025-06-09 PROCEDURE — 36415 COLL VENOUS BLD VENIPUNCTURE: CPT | Performed by: NURSE PRACTITIONER

## 2025-06-09 RX ORDER — WARFARIN SODIUM 10 MG/1
10 TABLET ORAL DAILY
COMMUNITY
End: 2025-06-09 | Stop reason: SDUPTHER

## 2025-06-09 RX ORDER — WARFARIN SODIUM 10 MG/1
10 TABLET ORAL DAILY
Qty: 30 TABLET | Refills: 5 | Status: SHIPPED | OUTPATIENT
Start: 2025-06-09

## 2025-06-09 RX ORDER — WARFARIN SODIUM 5 MG/1
5 TABLET ORAL DAILY
Qty: 90 TABLET | Refills: 3 | Status: SHIPPED | OUTPATIENT
Start: 2025-06-09

## 2025-06-09 NOTE — PROGRESS NOTES
Anticoagulation Summary  As of 2025      INR goal:  2.0-3.0   TTR:  90.4% (1.9 mo)   INR used for dosin.3 (2025)   Warfarin maintenance plan:  10 mg (5 mg x 2) every Tue, Thu, Sat; 5 mg (5 mg x 1) all other days   Weekly warfarin total:  50 mg   Plan last modified:  Chacha Almanzar MA (4/10/2025)   Next INR check:  2025   Target end date:  --             Anticoagulation Episode Summary       INR check location:  --    Preferred lab:  --    Send INR reminders to:  [facility] PRACTICE STAFF    Comments:  --          Anticoagulation Care Providers       Provider Role Specialty Phone number    Mike Harvey MD Referring Interventional Cardiology [phone]                Dosing Plan  As of 2025      TTR:  90.4% (1.9 mo)   Full warfarin instructions:  : 10 mg; Otherwise 10 mg every Tue, Thu, Sat; 5 mg all other days               Made Ken Ventura aware of findings by phone.     Electronically signed by BERNARDO Johnson NP on 25 at 11:15 AM TERRIT

## 2025-06-11 ENCOUNTER — TELEPHONE (OUTPATIENT)
Dept: CARDIOLOGY CLINIC | Age: 75
End: 2025-06-11

## 2025-06-11 NOTE — TELEPHONE ENCOUNTER
Date: 6/18/25     Cardiologist: Charlie     Procedure: Colonoscopy     Surgeon: Marshall     Last Office Visit: 3/18/25  Reason for office visit and medical concerns addressed at this office visit: afib, cardiomyopathy, cad, chf, htn, sss, dm, cva, retinal artery embolism     Testing Performed and Date of Service:  6/4/25 Afib Ablation   Successful pulmonary vein isolation of all 4 pulmonary veins as well as posterior lines drawn and focal anterior ablation where abnormal electrograms were seen on the right side.        Does the patient have a stent? If so, what type?  CABG 2013     Current Medications: warfarin, sotalol, ranexa, protonix, lisinopril, jardiance, imdur, lasix, eliquis, atorvastatin, aspirin     Is the patient currently taking an anticoagulant? If so, what is the diagnosis the patient has been given to warrant the need for the anticoagulant? warfarin for afib, retinal artery embolism     Additional Notes: requesting to hold warfarin

## 2025-06-17 ENCOUNTER — OFFICE VISIT (OUTPATIENT)
Dept: CARDIOLOGY CLINIC | Age: 75
End: 2025-06-17

## 2025-06-17 VITALS
DIASTOLIC BLOOD PRESSURE: 72 MMHG | SYSTOLIC BLOOD PRESSURE: 128 MMHG | BODY MASS INDEX: 29.53 KG/M2 | WEIGHT: 218 LBS | HEIGHT: 72 IN | HEART RATE: 81 BPM

## 2025-06-17 DIAGNOSIS — I25.5 ISCHEMIC CARDIOMYOPATHY: ICD-10-CM

## 2025-06-17 DIAGNOSIS — Z95.810 ICD (IMPLANTABLE CARDIOVERTER-DEFIBRILLATOR), DUAL, IN SITU: ICD-10-CM

## 2025-06-17 DIAGNOSIS — Z79.01 LONG TERM (CURRENT) USE OF ANTICOAGULANTS: ICD-10-CM

## 2025-06-17 DIAGNOSIS — I50.42 CHRONIC COMBINED SYSTOLIC AND DIASTOLIC HEART FAILURE (HCC): ICD-10-CM

## 2025-06-17 DIAGNOSIS — I48.0 PAF (PAROXYSMAL ATRIAL FIBRILLATION) (HCC): Primary | ICD-10-CM

## 2025-06-17 LAB
INTERNATIONAL NORMALIZATION RATIO, POC: 1.7
PROTHROMBIN TIME, POC: 19.3

## 2025-06-17 NOTE — PROGRESS NOTES
ICD interrogated  Presenting rhythm: AP VS, AP 94.4%,  0.8%  Battery status: 3.3 years  Lead status: lead impedance within range and stable  Sensing: P waves 2.1 mV,  R waves 7.3 mV  Thresholds:  Atrial 2.25 V @ 0.4ms, ventricular 1.0 @ 0.4ms  Observations:  1 monitored NSVT on 6/5/25 duration 1 second  See scanned report for details  Reprogramming for sensitivity and threshold testing  Next [facility] home check scheduled for 9/18/25

## 2025-06-17 NOTE — PROGRESS NOTES
[facility] Cardiology  [address]  [address]  [place] [address]  [phone]    Ken Ventura is a 74 y.o. male presents with atrial fibrillation.  He had a successful pulmonary vein isolation and has not had any atrial fibrillation since.      Review of Systems   Constitutional: Negative for fever, chills, diaphoresis, activity change, appetite change, fatigue and unexpected weight change.   Eyes: Negative for photophobia, pain, redness and visual disturbance.   Respiratory: Negative for apnea, cough, chest tightness, shortness of breath, wheezing and stridor.    Cardiovascular: Negative for chest pain, palpitations and leg swelling.   Gastrointestinal: Negative for abdominal distention.   Genitourinary: Negative for dysuria, urgency and frequency.   Musculoskeletal: Negative for myalgias, arthralgias and gait problem.   Skin: Negative for color change, pallor, rash and wound.   Neurological: Negative for dizziness, tremors, speech difficulty, weakness and numbness.   Hematological: Does not bruise/bleed easily.   Psychiatric/Behavioral: Negative.          Social History     Socioeconomic History    Marital status:      Spouse name: Mrs. 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

## 2025-06-25 ENCOUNTER — TELEPHONE (OUTPATIENT)
Dept: GASTROENTEROLOGY | Age: 75
End: 2025-06-25

## 2025-06-25 NOTE — TELEPHONE ENCOUNTER
6/25/25 - we rcvd clearance from Dr. Mazariegos - ok to hold warfarin 5 days prior to procedure.  Called patient to schedule colonoscopy, he said he does not want to schedule at this time, they have been having problems stabilizing his INR and is concerned with coming off of his blood thinner.  I told him to call when he is ready to schedule, but that the clearance is only valid for 60 days, so we may need to resend if it is after that.  He voiced understanding    cx due to cardiac clearance   SUrg 6.18.25 @830-CLN(trilyte)-screen-LH ONLY-

## 2025-06-30 ENCOUNTER — ANTI-COAG VISIT (OUTPATIENT)
Dept: CARDIOLOGY CLINIC | Age: 75
End: 2025-06-30
Payer: MEDICARE

## 2025-06-30 DIAGNOSIS — I48.0 PAF (PAROXYSMAL ATRIAL FIBRILLATION) (HCC): Primary | ICD-10-CM

## 2025-06-30 LAB
INTERNATIONAL NORMALIZATION RATIO, POC: 2.2
PROTHROMBIN TIME, POC: 24.2

## 2025-06-30 PROCEDURE — 85610 PROTHROMBIN TIME: CPT | Performed by: NURSE PRACTITIONER

## 2025-06-30 PROCEDURE — 36415 COLL VENOUS BLD VENIPUNCTURE: CPT | Performed by: NURSE PRACTITIONER

## 2025-06-30 NOTE — PROGRESS NOTES
Anticoagulation Summary  As of 2025      INR goal:  2.0-3.0   TTR:  72.5% (2.6 mo)   INR used for dosin.2 (2025)   Warfarin maintenance plan:  10 mg (5 mg x 2) every Tue, Thu, Sat; 5 mg (5 mg x 1) all other days   Weekly warfarin total:  50 mg   Plan last modified:  Chacha Almanzar MA (4/10/2025)   Next INR check:  2025   Target end date:  --             Anticoagulation Episode Summary       INR check location:  --    Preferred lab:  --    Send INR reminders to:  [facility] CARDIOLOGY ASSOC PRACTICE STAFF    Comments:  --          Anticoagulation Care Providers       Provider Role Specialty Phone number    Mike Harvey MD Referring Interventional Cardiology [phone]                Dosing Plan  As of 2025      TTR:  72.5% (2.6 mo)   Full warfarin instructions:  10 mg every Tue, Thu, Sat; 5 mg all other days               Made Ken Ventura aware of findings by phone.     Electronically signed by BERNARDO Johnson NP on 25 at 10:34 AM TERRIT

## 2025-07-01 ENCOUNTER — OFFICE VISIT (OUTPATIENT)
Dept: PULMONOLOGY | Facility: CLINIC | Age: 75
End: 2025-07-01
Payer: MEDICARE

## 2025-07-01 VITALS
WEIGHT: 235 LBS | DIASTOLIC BLOOD PRESSURE: 72 MMHG | HEART RATE: 80 BPM | BODY MASS INDEX: 31.83 KG/M2 | HEIGHT: 72 IN | OXYGEN SATURATION: 98 % | SYSTOLIC BLOOD PRESSURE: 110 MMHG

## 2025-07-01 DIAGNOSIS — Z12.2 SCREENING FOR MALIGNANT NEOPLASM OF RESPIRATORY ORGAN: Chronic | ICD-10-CM

## 2025-07-01 DIAGNOSIS — Z87.891 PERSONAL HISTORY OF NICOTINE DEPENDENCE: Chronic | ICD-10-CM

## 2025-07-01 DIAGNOSIS — J41.0 SIMPLE CHRONIC BRONCHITIS: Primary | Chronic | ICD-10-CM

## 2025-07-01 DIAGNOSIS — J43.2 CENTRILOBULAR EMPHYSEMA: Chronic | ICD-10-CM

## 2025-07-01 PROCEDURE — 99214 OFFICE O/P EST MOD 30 MIN: CPT | Performed by: NURSE PRACTITIONER

## 2025-07-01 PROCEDURE — 1160F RVW MEDS BY RX/DR IN RCRD: CPT | Performed by: NURSE PRACTITIONER

## 2025-07-01 PROCEDURE — 1159F MED LIST DOCD IN RCRD: CPT | Performed by: NURSE PRACTITIONER

## 2025-07-01 RX ORDER — WARFARIN SODIUM 5 MG/1
5 TABLET ORAL DAILY
COMMUNITY
Start: 2025-06-09

## 2025-07-01 NOTE — PROGRESS NOTES
"Chief Complaint  Simple chronic bronchitis    Subjective    History of Present Illness      Eric Ramey presents to [facility] for:    History of Present Illness   Annual appointment for management of chronic bronchitis, emphysema and lung nodules.  He quit smoking in 2013.  He is typically seen by Dr. Jb Burnette and I am seeing him in his absence.  He does not require any inhalers.  He reports that his pulmonary status is stable.  He was hospitalized in June at [facility] for PAF and underwent ablation again.  He has stayed in rhythm so far.  He has no new or worsening pulmonary complaints.  He is up to date on vaccines.  Objective   Vital Signs:   /72   Pulse 80   Ht 182.9 cm (72\")   Wt 107 kg (235 lb)   SpO2 98% Comment: RA  BMI 31.87 kg/m²     Physical Exam  Vitals reviewed.   Constitutional:       General: He is not in acute distress.     Appearance: Normal appearance.   HENT:      Head: Normocephalic and atraumatic.   Eyes:      General: No scleral icterus.  Cardiovascular:      Rate and Rhythm: Normal rate and regular rhythm.   Pulmonary:      Effort: Pulmonary effort is normal.      Breath sounds: Normal breath sounds.   Musculoskeletal:      Cervical back: Normal range of motion.   Skin:     General: Skin is warm and dry.   Neurological:      Mental Status: He is alert and oriented to person, place, and time.   Psychiatric:         Mood and Affect: Mood normal.         Behavior: Behavior normal.        Result Review :       No results found for this or any previous visit.               Assessment and Plan      Diagnoses and all orders for this visit:    1. Simple chronic bronchitis (Primary)  Comments:  Stable.  He does not require any inhalers.  Flow-volume loop on return.    2. Centrilobular emphysema  Comments:  Stable.  Continue to monitor.    3. Screening for malignant neoplasm of respiratory organ  Comments:  Due for annual LDCT in " August.  Orders:  -      CT Chest Low Dose Cancer Screening WO; Future    4. Personal history of nicotine dependence  Comments:  Quit smoking in 2013 with a 104 pack/year history.  Continue annual CTs as long as eligible.  Orders:  -      CT Chest Low Dose Cancer Screening WO; Future      Michel Levy, APRN  7/1/2025  10:12 CDT    Follow Up   Return in about 1 year (around 7/1/2026) for FVL on return; LDCT August 2025.    Patient was given instructions and counseling regarding his condition or for health maintenance advice. Please see specific information pulled into the AVS if appropriate.

## 2025-07-31 ENCOUNTER — TELEPHONE (OUTPATIENT)
Dept: PULMONOLOGY | Facility: CLINIC | Age: 75
End: 2025-07-31
Payer: MEDICARE

## 2025-07-31 NOTE — TELEPHONE ENCOUNTER
Provider: CHELLY FLORENTINO    Caller: Eric Ramey    Relationship to Patient: Self    Reason for Call: PATIENT WOULD LIKE TO HAVE CT ORDER FAX TO [facility]. PLEASE REACH OUT TO PATIENT ONCE HAS BEEN FAXED SO HE CAN CALL TO SCHEDULE

## 2025-08-01 ENCOUNTER — ANTI-COAG VISIT (OUTPATIENT)
Dept: CARDIOLOGY CLINIC | Age: 75
End: 2025-08-01
Payer: MEDICARE

## 2025-08-01 DIAGNOSIS — I48.0 PAF (PAROXYSMAL ATRIAL FIBRILLATION) (HCC): Primary | ICD-10-CM

## 2025-08-01 DIAGNOSIS — Z79.01 LONG TERM (CURRENT) USE OF ANTICOAGULANTS: ICD-10-CM

## 2025-08-01 LAB
INTERNATIONAL NORMALIZATION RATIO, POC: 2.5
PROTHROMBIN TIME, POC: 26.6

## 2025-08-01 PROCEDURE — 85610 PROTHROMBIN TIME: CPT | Performed by: CLINICAL NURSE SPECIALIST

## 2025-08-01 PROCEDURE — 36415 COLL VENOUS BLD VENIPUNCTURE: CPT | Performed by: CLINICAL NURSE SPECIALIST

## 2025-08-01 PROCEDURE — 93793 ANTICOAG MGMT PT WARFARIN: CPT | Performed by: CLINICAL NURSE SPECIALIST

## 2025-08-04 ENCOUNTER — TRANSCRIBE ORDERS (OUTPATIENT)
Dept: ADMINISTRATIVE | Age: 75
End: 2025-08-04

## 2025-08-04 DIAGNOSIS — Z12.2 SCREENING FOR MALIGNANT NEOPLASM OF RESPIRATORY ORGAN: Primary | ICD-10-CM

## 2025-08-04 DIAGNOSIS — Z87.891 PERSONAL HISTORY OF TOBACCO USE: ICD-10-CM

## 2025-08-22 ENCOUNTER — HOSPITAL ENCOUNTER (OUTPATIENT)
Dept: CT IMAGING | Age: 75
Discharge: HOME | End: 2025-08-22
Payer: MEDICARE

## 2025-08-22 DIAGNOSIS — Z12.2 SCREENING FOR MALIGNANT NEOPLASM OF RESPIRATORY ORGAN: ICD-10-CM

## 2025-08-22 DIAGNOSIS — Z87.891 PERSONAL HISTORY OF TOBACCO USE: ICD-10-CM

## 2025-08-22 PROCEDURE — 71271 CT THORAX LUNG CANCER SCR C-: CPT

## (undated) DEVICE — GUIDEWIRE VASC L180CM 0035IN 15MM J ROSEN TIP PTFE FIX COR

## (undated) DEVICE — 1 X VERSACROSS CONNECT TRANSSEPTAL DILATOR;  1 X VERSACROSS RF WIRE (INCLUDING 1 X CONNECTOR CABLE (SINGLE USE)): Brand: VERSACROSS CONNECT ACCESS SOLUTION FOR FARADRIVE

## (undated) DEVICE — CATHETER CONNECTION CABLE: Brand: FARASTAR™

## (undated) DEVICE — TUBING CNTRST DEL NO SYR HI PRSS INJ FEM LUER LCK ROT ADPT

## (undated) DEVICE — FORCEPS BX L240CM JAW DIA2.4MM ORNG L CAP W/ NDL DISP RAD

## (undated) DEVICE — PINNACLE INTRODUCER SHEATH: Brand: PINNACLE

## (undated) DEVICE — Device

## (undated) DEVICE — ENDO KIT,LOURDES HOSPITAL: Brand: MEDLINE INDUSTRIES, INC.

## (undated) DEVICE — PERCUTANEOUS ENTRY THINWALL NEEDLE  ONE-PART: Brand: COOK

## (undated) DEVICE — CATHETER ULTRASOUND NAVIGATIONAL 10 FRX90 CM VIVID I ACUNAV

## (undated) DEVICE — DEVICE VES SEAL OD 15 FR ID 10-12 FR PERC FEM VEN ACCS SITE

## (undated) DEVICE — ELECTRODE NON SPLIT DISPERSIVE PAD MAESTRO

## (undated) DEVICE — STEERABLE SHEATH CLEAR: Brand: FARADRIVE™

## (undated) DEVICE — PULSED FIELD ABLATION CATHETER: Brand: FARAWAVE™

## (undated) DEVICE — SYSTEM CLOSURE 6-12 FR VEN VASC VASCADE MVP

## (undated) DEVICE — INTRODUCER SHTH L11CM OD11FR 0.38IN AD VASC W/OUT CRV

## (undated) DEVICE — GUIDEWIRE VASC J 3 MM 0.035 INX260 CM 10 CM PTFE SS STRT

## (undated) DEVICE — PRESSURE MONITORING SET: Brand: TRUWAVE

## (undated) DEVICE — CATH REPROC POLARISX 270 DEG 110CM 6FR

## (undated) DEVICE — SNARE POLYP SM W13MMXL240CM SHTH DIA2.4MM OVL FLX DISP

## (undated) DEVICE — CATHETER CABLE, STERILE CABLE: Brand: POLARIS X™